# Patient Record
Sex: FEMALE | Race: WHITE | NOT HISPANIC OR LATINO | Employment: OTHER | URBAN - METROPOLITAN AREA
[De-identification: names, ages, dates, MRNs, and addresses within clinical notes are randomized per-mention and may not be internally consistent; named-entity substitution may affect disease eponyms.]

---

## 2019-11-29 ENCOUNTER — APPOINTMENT (OUTPATIENT)
Dept: RADIOLOGY | Facility: MEDICAL CENTER | Age: 84
DRG: 026 | End: 2019-11-29
Attending: HOSPITALIST
Payer: MEDICARE

## 2019-11-29 ENCOUNTER — APPOINTMENT (OUTPATIENT)
Dept: RADIOLOGY | Facility: MEDICAL CENTER | Age: 84
DRG: 026 | End: 2019-11-29
Attending: EMERGENCY MEDICINE
Payer: MEDICARE

## 2019-11-29 ENCOUNTER — APPOINTMENT (OUTPATIENT)
Dept: RADIOLOGY | Facility: MEDICAL CENTER | Age: 84
DRG: 026 | End: 2019-11-29
Attending: INTERNAL MEDICINE
Payer: MEDICARE

## 2019-11-29 ENCOUNTER — HOSPITAL ENCOUNTER (INPATIENT)
Facility: MEDICAL CENTER | Age: 84
LOS: 5 days | DRG: 026 | End: 2019-12-04
Attending: EMERGENCY MEDICINE | Admitting: HOSPITALIST
Payer: MEDICARE

## 2019-11-29 DIAGNOSIS — R53.1 RIGHT SIDED WEAKNESS: ICD-10-CM

## 2019-11-29 DIAGNOSIS — S06.5XAA SUBDURAL HEMATOMA (HCC): ICD-10-CM

## 2019-11-29 DIAGNOSIS — R20.2 PARESTHESIAS: ICD-10-CM

## 2019-11-29 PROBLEM — M25.512 CHRONIC LEFT SHOULDER PAIN: Status: ACTIVE | Noted: 2019-11-29

## 2019-11-29 PROBLEM — E03.9 ACQUIRED HYPOTHYROIDISM: Status: ACTIVE | Noted: 2019-11-29

## 2019-11-29 PROBLEM — G45.9 TIA (TRANSIENT ISCHEMIC ATTACK): Status: ACTIVE | Noted: 2019-11-29

## 2019-11-29 PROBLEM — G89.29 CHRONIC LEFT SHOULDER PAIN: Status: ACTIVE | Noted: 2019-11-29

## 2019-11-29 PROBLEM — R01.1 MURMUR, CARDIAC: Status: ACTIVE | Noted: 2019-11-29

## 2019-11-29 LAB
ABO + RH BLD: NORMAL
ABO GROUP BLD: NORMAL
ALBUMIN SERPL BCP-MCNC: 4.7 G/DL (ref 3.2–4.9)
ALBUMIN/GLOB SERPL: 2.5 G/DL
ALP SERPL-CCNC: 61 U/L (ref 30–99)
ALT SERPL-CCNC: 7 U/L (ref 2–50)
ANION GAP SERPL CALC-SCNC: 12 MMOL/L (ref 0–11.9)
APPEARANCE UR: CLEAR
APTT PPP: 28.8 SEC (ref 24.7–36)
AST SERPL-CCNC: 17 U/L (ref 12–45)
BACTERIA #/AREA URNS HPF: NEGATIVE /HPF
BASOPHILS # BLD AUTO: 0.1 % (ref 0–1.8)
BASOPHILS # BLD: 0.01 K/UL (ref 0–0.12)
BILIRUB SERPL-MCNC: 1 MG/DL (ref 0.1–1.5)
BILIRUB UR QL STRIP.AUTO: NEGATIVE
BLD GP AB SCN SERPL QL: NORMAL
BUN SERPL-MCNC: 18 MG/DL (ref 8–22)
CALCIUM SERPL-MCNC: 9.1 MG/DL (ref 8.5–10.5)
CHLORIDE SERPL-SCNC: 104 MMOL/L (ref 96–112)
CO2 SERPL-SCNC: 21 MMOL/L (ref 20–33)
COLOR UR: YELLOW
CREAT SERPL-MCNC: 0.95 MG/DL (ref 0.5–1.4)
EKG IMPRESSION: NORMAL
EOSINOPHIL # BLD AUTO: 0.06 K/UL (ref 0–0.51)
EOSINOPHIL NFR BLD: 0.8 % (ref 0–6.9)
EPI CELLS #/AREA URNS HPF: NEGATIVE /HPF
ERYTHROCYTE [DISTWIDTH] IN BLOOD BY AUTOMATED COUNT: 48.3 FL (ref 35.9–50)
EST. AVERAGE GLUCOSE BLD GHB EST-MCNC: 126 MG/DL
GLOBULIN SER CALC-MCNC: 1.9 G/DL (ref 1.9–3.5)
GLUCOSE SERPL-MCNC: 86 MG/DL (ref 65–99)
GLUCOSE UR STRIP.AUTO-MCNC: NEGATIVE MG/DL
HBA1C MFR BLD: 6 % (ref 0–5.6)
HCT VFR BLD AUTO: 42.2 % (ref 37–47)
HGB BLD-MCNC: 14 G/DL (ref 12–16)
HYALINE CASTS #/AREA URNS LPF: NORMAL /LPF
IMM GRANULOCYTES # BLD AUTO: 0.05 K/UL (ref 0–0.11)
IMM GRANULOCYTES NFR BLD AUTO: 0.7 % (ref 0–0.9)
INR PPP: 0.98 (ref 0.87–1.13)
KETONES UR STRIP.AUTO-MCNC: NEGATIVE MG/DL
LEUKOCYTE ESTERASE UR QL STRIP.AUTO: ABNORMAL
LYMPHOCYTES # BLD AUTO: 1.02 K/UL (ref 1–4.8)
LYMPHOCYTES NFR BLD: 13.9 % (ref 22–41)
MCH RBC QN AUTO: 29.9 PG (ref 27–33)
MCHC RBC AUTO-ENTMCNC: 33.2 G/DL (ref 33.6–35)
MCV RBC AUTO: 90.2 FL (ref 81.4–97.8)
MICRO URNS: ABNORMAL
MONOCYTES # BLD AUTO: 1.71 K/UL (ref 0–0.85)
MONOCYTES NFR BLD AUTO: 23.4 % (ref 0–13.4)
NEUTROPHILS # BLD AUTO: 4.47 K/UL (ref 2–7.15)
NEUTROPHILS NFR BLD: 61.1 % (ref 44–72)
NITRITE UR QL STRIP.AUTO: NEGATIVE
NRBC # BLD AUTO: 0 K/UL
NRBC BLD-RTO: 0 /100 WBC
PH UR STRIP.AUTO: 7 [PH] (ref 5–8)
PLATELET # BLD AUTO: 156 K/UL (ref 164–446)
PMV BLD AUTO: 11 FL (ref 9–12.9)
POTASSIUM SERPL-SCNC: 3.8 MMOL/L (ref 3.6–5.5)
PROT SERPL-MCNC: 6.6 G/DL (ref 6–8.2)
PROT UR QL STRIP: NEGATIVE MG/DL
PROTHROMBIN TIME: 13.1 SEC (ref 12–14.6)
RBC # BLD AUTO: 4.68 M/UL (ref 4.2–5.4)
RBC # URNS HPF: NORMAL /HPF
RBC UR QL AUTO: NEGATIVE
RH BLD: NORMAL
SODIUM SERPL-SCNC: 137 MMOL/L (ref 135–145)
SP GR UR STRIP.AUTO: 1.01
TROPONIN T SERPL-MCNC: 15 NG/L (ref 6–19)
UROBILINOGEN UR STRIP.AUTO-MCNC: 0.2 MG/DL
WBC # BLD AUTO: 7.3 K/UL (ref 4.8–10.8)
WBC #/AREA URNS HPF: NORMAL /HPF

## 2019-11-29 PROCEDURE — 0042T CT-CEREBRAL PERFUSION ANALYSIS: CPT

## 2019-11-29 PROCEDURE — 85610 PROTHROMBIN TIME: CPT

## 2019-11-29 PROCEDURE — 84484 ASSAY OF TROPONIN QUANT: CPT

## 2019-11-29 PROCEDURE — 70498 CT ANGIOGRAPHY NECK: CPT

## 2019-11-29 PROCEDURE — 70450 CT HEAD/BRAIN W/O DYE: CPT

## 2019-11-29 PROCEDURE — 770022 HCHG ROOM/CARE - ICU (200)

## 2019-11-29 PROCEDURE — 96374 THER/PROPH/DIAG INJ IV PUSH: CPT

## 2019-11-29 PROCEDURE — 83036 HEMOGLOBIN GLYCOSYLATED A1C: CPT

## 2019-11-29 PROCEDURE — 81001 URINALYSIS AUTO W/SCOPE: CPT

## 2019-11-29 PROCEDURE — 99223 1ST HOSP IP/OBS HIGH 75: CPT | Performed by: PSYCHIATRY & NEUROLOGY

## 2019-11-29 PROCEDURE — 86901 BLOOD TYPING SEROLOGIC RH(D): CPT

## 2019-11-29 PROCEDURE — 85025 COMPLETE CBC W/AUTO DIFF WBC: CPT

## 2019-11-29 PROCEDURE — 99291 CRITICAL CARE FIRST HOUR: CPT | Performed by: INTERNAL MEDICINE

## 2019-11-29 PROCEDURE — 73030 X-RAY EXAM OF SHOULDER: CPT | Mod: LT

## 2019-11-29 PROCEDURE — 70496 CT ANGIOGRAPHY HEAD: CPT

## 2019-11-29 PROCEDURE — 700101 HCHG RX REV CODE 250: Performed by: EMERGENCY MEDICINE

## 2019-11-29 PROCEDURE — 85730 THROMBOPLASTIN TIME PARTIAL: CPT

## 2019-11-29 PROCEDURE — 71045 X-RAY EXAM CHEST 1 VIEW: CPT

## 2019-11-29 PROCEDURE — 99291 CRITICAL CARE FIRST HOUR: CPT

## 2019-11-29 PROCEDURE — 70551 MRI BRAIN STEM W/O DYE: CPT

## 2019-11-29 PROCEDURE — A9270 NON-COVERED ITEM OR SERVICE: HCPCS | Performed by: HOSPITALIST

## 2019-11-29 PROCEDURE — 80053 COMPREHEN METABOLIC PANEL: CPT

## 2019-11-29 PROCEDURE — 93005 ELECTROCARDIOGRAM TRACING: CPT | Performed by: EMERGENCY MEDICINE

## 2019-11-29 PROCEDURE — 700102 HCHG RX REV CODE 250 W/ 637 OVERRIDE(OP): Performed by: HOSPITALIST

## 2019-11-29 PROCEDURE — 700117 HCHG RX CONTRAST REV CODE 255: Performed by: EMERGENCY MEDICINE

## 2019-11-29 PROCEDURE — 86850 RBC ANTIBODY SCREEN: CPT

## 2019-11-29 PROCEDURE — 99223 1ST HOSP IP/OBS HIGH 75: CPT | Mod: AI | Performed by: HOSPITALIST

## 2019-11-29 PROCEDURE — 86900 BLOOD TYPING SEROLOGIC ABO: CPT

## 2019-11-29 RX ORDER — SODIUM CHLORIDE 9 MG/ML
INJECTION, SOLUTION INTRAVENOUS
Status: DISCONTINUED | OUTPATIENT
Start: 2019-11-29 | End: 2019-11-29 | Stop reason: HOSPADM

## 2019-11-29 RX ORDER — ATORVASTATIN CALCIUM 40 MG/1
40 TABLET, FILM COATED ORAL EVERY EVENING
Status: DISCONTINUED | OUTPATIENT
Start: 2019-11-29 | End: 2019-12-04 | Stop reason: HOSPADM

## 2019-11-29 RX ORDER — LABETALOL HYDROCHLORIDE 5 MG/ML
10 INJECTION, SOLUTION INTRAVENOUS EVERY 4 HOURS PRN
Status: DISCONTINUED | OUTPATIENT
Start: 2019-11-29 | End: 2019-11-30

## 2019-11-29 RX ORDER — LEVOTHYROXINE SODIUM 0.1 MG/1
100 TABLET ORAL
COMMUNITY
End: 2019-12-07

## 2019-11-29 RX ORDER — IBUPROFEN 200 MG
200 TABLET ORAL EVERY 6 HOURS PRN
Status: ON HOLD | COMMUNITY
End: 2019-12-04

## 2019-11-29 RX ORDER — HYDRALAZINE HYDROCHLORIDE 20 MG/ML
20 INJECTION INTRAMUSCULAR; INTRAVENOUS EVERY 4 HOURS PRN
Status: DISCONTINUED | OUTPATIENT
Start: 2019-11-29 | End: 2019-11-29 | Stop reason: HOSPADM

## 2019-11-29 RX ORDER — BISACODYL 10 MG
10 SUPPOSITORY, RECTAL RECTAL
Status: DISCONTINUED | OUTPATIENT
Start: 2019-11-29 | End: 2019-12-04 | Stop reason: HOSPADM

## 2019-11-29 RX ORDER — LEVETIRACETAM 500 MG/1
500 TABLET ORAL 2 TIMES DAILY
Status: DISCONTINUED | OUTPATIENT
Start: 2019-11-29 | End: 2019-12-03

## 2019-11-29 RX ORDER — LEVOTHYROXINE SODIUM 0.1 MG/1
100 TABLET ORAL
Status: DISCONTINUED | OUTPATIENT
Start: 2019-11-29 | End: 2019-12-04 | Stop reason: HOSPADM

## 2019-11-29 RX ORDER — ONDANSETRON 4 MG/1
4 TABLET, ORALLY DISINTEGRATING ORAL EVERY 4 HOURS PRN
Status: DISCONTINUED | OUTPATIENT
Start: 2019-11-29 | End: 2019-12-04 | Stop reason: HOSPADM

## 2019-11-29 RX ORDER — ONDANSETRON 2 MG/ML
4 INJECTION INTRAMUSCULAR; INTRAVENOUS EVERY 4 HOURS PRN
Status: DISCONTINUED | OUTPATIENT
Start: 2019-11-29 | End: 2019-12-04 | Stop reason: HOSPADM

## 2019-11-29 RX ORDER — LEVETIRACETAM 100 MG/ML
500 SOLUTION ORAL EVERY 12 HOURS
Status: DISCONTINUED | OUTPATIENT
Start: 2019-11-29 | End: 2019-11-29

## 2019-11-29 RX ORDER — POLYETHYLENE GLYCOL 3350 17 G/17G
1 POWDER, FOR SOLUTION ORAL
Status: DISCONTINUED | OUTPATIENT
Start: 2019-11-29 | End: 2019-12-04 | Stop reason: HOSPADM

## 2019-11-29 RX ORDER — ACETAMINOPHEN 325 MG/1
650 TABLET ORAL EVERY 6 HOURS PRN
Status: DISCONTINUED | OUTPATIENT
Start: 2019-11-29 | End: 2019-12-04 | Stop reason: HOSPADM

## 2019-11-29 RX ORDER — AMOXICILLIN 250 MG
2 CAPSULE ORAL 2 TIMES DAILY
Status: DISCONTINUED | OUTPATIENT
Start: 2019-11-29 | End: 2019-12-04 | Stop reason: HOSPADM

## 2019-11-29 RX ORDER — LABETALOL HYDROCHLORIDE 5 MG/ML
10 INJECTION, SOLUTION INTRAVENOUS
Status: DISCONTINUED | OUTPATIENT
Start: 2019-11-29 | End: 2019-11-29

## 2019-11-29 RX ADMIN — IOHEXOL 40 ML: 350 INJECTION, SOLUTION INTRAVENOUS at 11:27

## 2019-11-29 RX ADMIN — ATORVASTATIN CALCIUM 40 MG: 40 TABLET, FILM COATED ORAL at 18:29

## 2019-11-29 RX ADMIN — LABETALOL HYDROCHLORIDE 10 MG: 5 INJECTION, SOLUTION INTRAVENOUS at 12:24

## 2019-11-29 RX ADMIN — SENNOSIDES AND DOCUSATE SODIUM 2 TABLET: 8.6; 5 TABLET ORAL at 18:29

## 2019-11-29 RX ADMIN — LEVETIRACETAM 500 MG: 500 TABLET ORAL at 18:29

## 2019-11-29 RX ADMIN — IOHEXOL 80 ML: 350 INJECTION, SOLUTION INTRAVENOUS at 11:36

## 2019-11-29 SDOH — ECONOMIC STABILITY: FOOD INSECURITY: WITHIN THE PAST 12 MONTHS, YOU WORRIED THAT YOUR FOOD WOULD RUN OUT BEFORE YOU GOT MONEY TO BUY MORE.: NEVER TRUE

## 2019-11-29 SDOH — HEALTH STABILITY: MENTAL HEALTH: HOW OFTEN DO YOU HAVE 6 OR MORE DRINKS ON ONE OCCASION?: NEVER

## 2019-11-29 SDOH — HEALTH STABILITY: MENTAL HEALTH: HOW MANY STANDARD DRINKS CONTAINING ALCOHOL DO YOU HAVE ON A TYPICAL DAY?: 1 OR 2

## 2019-11-29 SDOH — ECONOMIC STABILITY: TRANSPORTATION INSECURITY
IN THE PAST 12 MONTHS, HAS LACK OF TRANSPORTATION KEPT YOU FROM MEETINGS, WORK, OR FROM GETTING THINGS NEEDED FOR DAILY LIVING?: NO

## 2019-11-29 SDOH — ECONOMIC STABILITY: FOOD INSECURITY: WITHIN THE PAST 12 MONTHS, THE FOOD YOU BOUGHT JUST DIDN'T LAST AND YOU DIDN'T HAVE MONEY TO GET MORE.: NEVER TRUE

## 2019-11-29 SDOH — ECONOMIC STABILITY: TRANSPORTATION INSECURITY
IN THE PAST 12 MONTHS, HAS THE LACK OF TRANSPORTATION KEPT YOU FROM MEDICAL APPOINTMENTS OR FROM GETTING MEDICATIONS?: NO

## 2019-11-29 SDOH — HEALTH STABILITY: MENTAL HEALTH: HOW OFTEN DO YOU HAVE A DRINK CONTAINING ALCOHOL?: 4 OR MORE TIMES A WEEK

## 2019-11-29 ASSESSMENT — LIFESTYLE VARIABLES
DO YOU DRINK ALCOHOL: NO
HOW MANY TIMES IN THE PAST YEAR HAVE YOU HAD 5 OR MORE DRINKS IN A DAY: 0
HAVE YOU EVER FELT YOU SHOULD CUT DOWN ON YOUR DRINKING: NO
ALCOHOL_USE: YES
ON A TYPICAL DAY WHEN YOU DRINK ALCOHOL HOW MANY DRINKS DO YOU HAVE: 1
EVER_SMOKED: YES
TOTAL SCORE: 0
DOES PATIENT WANT TO STOP DRINKING: NO
EVER FELT BAD OR GUILTY ABOUT YOUR DRINKING: NO
TOTAL SCORE: 0
HAVE PEOPLE ANNOYED YOU BY CRITICIZING YOUR DRINKING: NO
AVERAGE NUMBER OF DAYS PER WEEK YOU HAVE A DRINK CONTAINING ALCOHOL: 1
TOTAL SCORE: 0
EVER HAD A DRINK FIRST THING IN THE MORNING TO STEADY YOUR NERVES TO GET RID OF A HANGOVER: NO
CONSUMPTION TOTAL: NEGATIVE

## 2019-11-29 ASSESSMENT — COGNITIVE AND FUNCTIONAL STATUS - GENERAL
DAILY ACTIVITIY SCORE: 24
CLIMB 3 TO 5 STEPS WITH RAILING: A LITTLE
SUGGESTED CMS G CODE MODIFIER MOBILITY: CJ
WALKING IN HOSPITAL ROOM: A LITTLE
SUGGESTED CMS G CODE MODIFIER DAILY ACTIVITY: CH
MOBILITY SCORE: 22

## 2019-11-29 ASSESSMENT — ENCOUNTER SYMPTOMS
DIARRHEA: 0
SENSORY CHANGE: 1
ORTHOPNEA: 0
BACK PAIN: 0
FOCAL WEAKNESS: 1
SPUTUM PRODUCTION: 0
FEVER: 0
HEADACHES: 0
PHOTOPHOBIA: 0
NERVOUS/ANXIOUS: 0
SPEECH CHANGE: 0
SEIZURES: 0
ABDOMINAL PAIN: 0
NAUSEA: 0
DIZZINESS: 0
DOUBLE VISION: 0
VOMITING: 0
LOSS OF CONSCIOUSNESS: 0
PALPITATIONS: 0
CHILLS: 0
BLURRED VISION: 0
COUGH: 0
WEAKNESS: 1
SHORTNESS OF BREATH: 0

## 2019-11-29 ASSESSMENT — PATIENT HEALTH QUESTIONNAIRE - PHQ9
2. FEELING DOWN, DEPRESSED, IRRITABLE, OR HOPELESS: NOT AT ALL
1. LITTLE INTEREST OR PLEASURE IN DOING THINGS: NOT AT ALL
SUM OF ALL RESPONSES TO PHQ9 QUESTIONS 1 AND 2: 0
1. LITTLE INTEREST OR PLEASURE IN DOING THINGS: NOT AT ALL
2. FEELING DOWN, DEPRESSED, IRRITABLE, OR HOPELESS: NOT AT ALL
SUM OF ALL RESPONSES TO PHQ9 QUESTIONS 1 AND 2: 0

## 2019-11-29 NOTE — ED NOTES
ERP aware of BP, order to recheck in 15 minutes if still hypertensive then medications already ordered PRN.

## 2019-11-29 NOTE — CONSULTS
Critical Care Consultation    Date of consult: 11/29/2019    Referring Physician  Romi Madrid M.D.    Reason for Consultation  SDH    History of Presenting Illness  84 y.o. female who to ED with acute onset of right side numbness and RUL weakness. Started at 10.30am today. Code stroke was activated. CT head with acute-subacute 6mm SDH on the left and 2-3mm left shift. CTA head/neck w/o LVO or aneurysms in anterior/posterior circulation. CTP was no mismatched volume.   No hx of seizures. No trauma. No hx of CVA. Not on any anticoagulation    Per patient, she fell out of bed in 10/2019, didn't remember any head trauma. No LOC, dizziness.  Since then she started to have headache that occurs frequently almost every day (5 days per week).   She also has left side shoulder pain and inability to bring her left shoulder above head since the fall. In addition, she fell in 7/2019, but not remembering the details and not sure if head trauma was involved.     During my exam, pt reported that her right side facial numbness has improved and in fact almost resolved. RUE weakness is also resolving. Denies any chest pain, SOB, leg pain, leg weakness, dizziness, blurry vision/double vision    Neurology was on the case. NSGY was called and recommended CT head in am. Start on keppra for seizure prophylaxis    Code Status  Full Code    Review of Systems  Review of Systems   Constitutional: Negative for chills, fever and malaise/fatigue.   Eyes: Negative for blurred vision, double vision and photophobia.   Respiratory: Negative for cough, sputum production and shortness of breath.    Cardiovascular: Negative for chest pain, palpitations, orthopnea and leg swelling.   Gastrointestinal: Negative for abdominal pain, diarrhea, nausea and vomiting.   Genitourinary: Negative for dysuria.   Musculoskeletal: Negative for back pain and joint pain.   Skin: Negative for rash.   Neurological: Positive for sensory change, focal weakness and  weakness. Negative for dizziness, speech change, seizures, loss of consciousness and headaches.   Psychiatric/Behavioral: The patient is not nervous/anxious.    All other systems reviewed and are negative.      Past Medical History   has no past medical history on file.    Surgical History   has no past surgical history on file.    Family History  family history is not on file.    Social History       Medications  Home Medications     Reviewed by Arian Mahajan (Pharmacy Tech) on 11/29/19 at 1417  Med List Status: Complete   Medication Last Dose Status   ibuprofen (MOTRIN) 200 MG Tab 11/29/2019 Active   levothyroxine (SYNTHROID) 100 MCG Tab 11/29/2019 Active              Current Facility-Administered Medications   Medication Dose Route Frequency Provider Last Rate Last Dose   • labetalol (NORMODYNE/TRANDATE) injection 10 mg  10 mg Intravenous Q HOUR PRN Romi Madrid M.D.   10 mg at 11/29/19 1224   • hydrALAZINE (APRESOLINE) injection 20 mg  20 mg Intravenous Q4HRS PRN Romi Madrid M.D.       • niCARdipine (CARDENE) 25 mg in  mL Infusion  2.5-15 mg/hr Intravenous Continuous Romi Madrid M.D.   Stopped at 11/29/19 1215   • NS infusion   Intravenous Once PRN Romi Madrid M.D.       • norepinephrine (LEVOPHED) 8 mg in  mL Infusion  1-30 mcg/min Intravenous Continuous Romi Madrid M.D.   Stopped at 11/29/19 1215   • levothyroxine (SYNTHROID) tablet 100 mcg  100 mcg Oral AM ES Colin Ortez M.D.   Stopped at 11/29/19 1500   • senna-docusate (PERICOLACE or SENOKOT S) 8.6-50 MG per tablet 2 Tab  2 Tab Oral BID Colin Ortez M.D.        And   • polyethylene glycol/lytes (MIRALAX) PACKET 1 Packet  1 Packet Oral QDAY PRN Colin Ortez M.D.        And   • magnesium hydroxide (MILK OF MAGNESIA) suspension 30 mL  30 mL Oral QDAY PRN Colin Ortez M.D.        And   • bisacodyl (DULCOLAX) suppository 10 mg  10 mg Rectal QDAY PRN Colin Ortez M.D.       •  Pharmacy consult request - Allow for permissive hypertension: SBP up to 220 mmHg/DBP up to 120 mmHg x 48 hours   Other PHARMACY TO DOSE Colin Ortez M.D.       • acetaminophen (TYLENOL) tablet 650 mg  650 mg Oral Q6HRS PRN Colin Ortez M.D.       • labetalol (NORMODYNE/TRANDATE) injection 10 mg  10 mg Intravenous Q4HRS PRN Colin Ortez M.D.       • ondansetron (ZOFRAN) syringe/vial injection 4 mg  4 mg Intravenous Q4HRS PRN Colin Ortez M.D.       • ondansetron (ZOFRAN ODT) dispertab 4 mg  4 mg Oral Q4HRS PRN Colin Ortez M.D.       • atorvastatin (LIPITOR) tablet 40 mg  40 mg Oral Q EVENING Colin Ortez M.D.       • levETIRAcetam (KEPPRA) 100 MG/ML solution 500 mg  500 mg Oral Q12HRS Colin Ortez M.D.         Current Outpatient Medications   Medication Sig Dispense Refill   • levothyroxine (SYNTHROID) 100 MCG Tab Take 100 mcg by mouth Every morning on an empty stomach.     • ibuprofen (MOTRIN) 200 MG Tab Take 200 mg by mouth every 6 hours as needed for Mild Pain.         Allergies  Allergies   Allergen Reactions   • Sulfa Drugs Itching       Vital Signs last 24 hours  Pulse:  [70-82] 70  Resp:  [16-18] 18  BP: (134-186)/(59-87) 134/67  SpO2:  [94 %-97 %] 97 %    Physical Exam  Physical Exam  Vitals signs and nursing note reviewed.   Constitutional:       General: She is not in acute distress.     Appearance: Normal appearance. She is not ill-appearing, toxic-appearing or diaphoretic.      Comments: Alert, oriented  Appropriate to questions  Daughter at bedside during my exam   HENT:      Head: Normocephalic and atraumatic.      Nose: Nose normal.      Mouth/Throat:      Mouth: Mucous membranes are moist.   Eyes:      Conjunctiva/sclera: Conjunctivae normal.      Pupils: Pupils are equal, round, and reactive to light.   Neck:      Musculoskeletal: Normal range of motion and neck supple.   Cardiovascular:      Rate and Rhythm: Normal rate and regular rhythm.       Pulses: Normal pulses.      Heart sounds: Normal heart sounds. No murmur.   Pulmonary:      Effort: Pulmonary effort is normal. No respiratory distress.      Breath sounds: Normal breath sounds. No wheezing, rhonchi or rales.   Abdominal:      General: Bowel sounds are normal. There is no distension.      Palpations: Abdomen is soft.      Tenderness: There is no tenderness. There is no guarding.   Musculoskeletal:      Right lower leg: No edema.      Left lower leg: No edema.      Comments: Left shoulder with limited ROM  Inability to lift left shoulder above head, decrease abduction/flexion   Skin:     General: Skin is warm.      Capillary Refill: Capillary refill takes 2 to 3 seconds.      Coloration: Skin is not jaundiced.      Findings: No bruising or rash.   Neurological:      General: No focal deficit present.      Mental Status: She is alert and oriented to person, place, and time.      Cranial Nerves: No cranial nerve deficit.      Sensory: No sensory deficit.      Motor: No weakness.      Comments: Alert, oriented, able to provide history  No dysarthria  CN II-XII grossly intact   Psychiatric:         Mood and Affect: Mood normal.         Behavior: Behavior normal.         Fluids  No intake or output data in the 24 hours ending 11/29/19 1452    Laboratory  Recent Results (from the past 48 hour(s))   CBC WITH DIFFERENTIAL    Collection Time: 11/29/19 11:19 AM   Result Value Ref Range    WBC 7.3 4.8 - 10.8 K/uL    RBC 4.68 4.20 - 5.40 M/uL    Hemoglobin 14.0 12.0 - 16.0 g/dL    Hematocrit 42.2 37.0 - 47.0 %    MCV 90.2 81.4 - 97.8 fL    MCH 29.9 27.0 - 33.0 pg    MCHC 33.2 (L) 33.6 - 35.0 g/dL    RDW 48.3 35.9 - 50.0 fL    Platelet Count 156 (L) 164 - 446 K/uL    MPV 11.0 9.0 - 12.9 fL    Neutrophils-Polys 61.10 44.00 - 72.00 %    Lymphocytes 13.90 (L) 22.00 - 41.00 %    Monocytes 23.40 (H) 0.00 - 13.40 %    Eosinophils 0.80 0.00 - 6.90 %    Basophils 0.10 0.00 - 1.80 %    Immature Granulocytes 0.70 0.00 -  0.90 %    Nucleated RBC 0.00 /100 WBC    Neutrophils (Absolute) 4.47 2.00 - 7.15 K/uL    Lymphs (Absolute) 1.02 1.00 - 4.80 K/uL    Monos (Absolute) 1.71 (H) 0.00 - 0.85 K/uL    Eos (Absolute) 0.06 0.00 - 0.51 K/uL    Baso (Absolute) 0.01 0.00 - 0.12 K/uL    Immature Granulocytes (abs) 0.05 0.00 - 0.11 K/uL    NRBC (Absolute) 0.00 K/uL   COMP METABOLIC PANEL    Collection Time: 11/29/19 11:19 AM   Result Value Ref Range    Sodium 137 135 - 145 mmol/L    Potassium 3.8 3.6 - 5.5 mmol/L    Chloride 104 96 - 112 mmol/L    Co2 21 20 - 33 mmol/L    Anion Gap 12.0 (H) 0.0 - 11.9    Glucose 86 65 - 99 mg/dL    Bun 18 8 - 22 mg/dL    Creatinine 0.95 0.50 - 1.40 mg/dL    Calcium 9.1 8.5 - 10.5 mg/dL    AST(SGOT) 17 12 - 45 U/L    ALT(SGPT) 7 2 - 50 U/L    Alkaline Phosphatase 61 30 - 99 U/L    Total Bilirubin 1.0 0.1 - 1.5 mg/dL    Albumin 4.7 3.2 - 4.9 g/dL    Total Protein 6.6 6.0 - 8.2 g/dL    Globulin 1.9 1.9 - 3.5 g/dL    A-G Ratio 2.5 g/dL   PROTHROMBIN TIME    Collection Time: 11/29/19 11:19 AM   Result Value Ref Range    PT 13.1 12.0 - 14.6 sec    INR 0.98 0.87 - 1.13   APTT    Collection Time: 11/29/19 11:19 AM   Result Value Ref Range    APTT 28.8 24.7 - 36.0 sec   COD (ADULT)    Collection Time: 11/29/19 11:19 AM   Result Value Ref Range    ABO Grouping Only O     Rh Grouping Only POS     Antibody Screen-Cod NEG    TROPONIN    Collection Time: 11/29/19 11:19 AM   Result Value Ref Range    Troponin T 15 6 - 19 ng/L   ESTIMATED GFR    Collection Time: 11/29/19 11:19 AM   Result Value Ref Range    GFR If African American >60 >60 mL/min/1.73 m 2    GFR If Non  56 (A) >60 mL/min/1.73 m 2   EKG (NOW)    Collection Time: 11/29/19 11:52 AM   Result Value Ref Range    Report       [facility]    Test Date:  2019-11-29  Pt Name:    JIN OLMEDO               Department: ER  MRN:        [id number]                      Room:  Gender:     Female                       Technician:  [id number]  :        [id number]                   Requested By:REN HODGE  Order #:    [id number]                    Reading MD:    Measurements  Intervals                                Axis  Rate:       81                           P:          46  MS:         160                          QRS:        13  QRSD:       80                           T:          16  QT:         404  QTc:        469    Interpretive Statements  SINUS RHYTHM  PROBABLE LEFT ATRIAL ABNORMALITY  EARLY PRECORDIAL R/S TRANSITION  No previous ECG available for comparison     URINALYSIS CULTURE, IF INDICATED    Collection Time: 19 12:13 PM   Result Value Ref Range    Color Yellow     Character Clear     Specific Gravity 1.013 <1.035    Ph 7.0 5.0 - 8.0    Glucose Negative Negative mg/dL    Ketones Negative Negative mg/dL    Protein Negative Negative mg/dL    Bilirubin Negative Negative    Urobilinogen, Urine 0.2 Negative    Nitrite Negative Negative    Leukocyte Esterase Trace (A) Negative    Occult Blood Negative Negative    Micro Urine Req Microscopic    URINE MICROSCOPIC (W/UA)    Collection Time: 19 12:13 PM   Result Value Ref Range    WBC 0-2 /hpf    RBC 0-2 /hpf    Bacteria Negative None /hpf    Epithelial Cells Negative /hpf    Hyaline Cast 0-2 /lpf   ABO Rh Confirm    Collection Time: 19 12:42 PM   Result Value Ref Range    ABO Rh Confirm O POS        Imaging  DX-CHEST-PORTABLE (1 VIEW)   Final Result      No acute cardiac or pulmonary abnormalities are identified.      CT-CTA HEAD WITH & W/O-POST PROCESS   Final Result      CT angiogram of the [place] within normal limits.      CT-CTA NECK WITH & W/O-POST PROCESSING   Final Result      Mild atherosclerotic disease without evidence of significant stenosis or occlusion.      CT-CEREBRAL PERFUSION ANALYSIS   Final Result      1.  Cerebral blood flow less than 30% likely representing completed infarct = 0 mL.      2.  T Max more than 6 seconds likely representing  combination of completed infarct and ischemia = 0 mL.      3.  Mismatched volume likely representing ischemic brain/penumbra = None      4.  Please note that the cerebral perfusion was performed on the limited brain tissue around the basal ganglia region. Infarct/ischemia outside the CT perfusion sections can be missed in this study.      CT-HEAD W/O   Final Result      Left parietal subdural hematoma appears acute-subacute and there is a 2 mm rightward midline shift      Age-appropriate cerebral volume loss.      Moderate white matter hypodensity is present.  This is a nonspecific finding which usually is found to represent chronic microvascular disease in patient's of this demographic.  Demyelination, age indeterminant ischemia and gliosis are also common    possibilities.      Chronic right caudate head lacunar infarction      I discussed the findings with the patient's neurologist before this interpretation was generated      MR-BRAIN-W/O    (Results Pending)       Assessment/Plan  * Subdural hematoma (HCC)  Assessment & Plan  Acute/subacute 6mm SDH on the left with 2-3mm midline shift. Suspect due to the fall in 10/2019.   No obvious evidence of CVA on CT. CTA head/neck without any LVO or aneurysm.   CTP with no mismatched volume.     Plan:  neurocheck Q1H  MRI brain   Keppra 500 Q12H  I don't think we need to start any hypertonic solution given her resolving neurological symptoms.   Low threshold for repeat CT if there's acute neurological changes  Can protect her airway at this point.   Neuro following  NSGY has been consulted      Left shoulder pain  Assessment & Plan  Likely subacute from her fall in 10/2019  Check Xray left shoulder to rule out fracture.   No evidence of edema/erythema on my exam. Limited ROM, inability to lift left arm above head or abduction/flexion    Admit to ICU, RICU    Discussed patient condition and risk of morbidity and/or mortality with Hospitalist, Family, RN, RT, Pharmacy and  Patient.    The patient remains critically ill.  Critical care time = 38 minutes in directly providing and coordinating critical care and extensive data review.  No time overlap and excludes procedures.

## 2019-11-29 NOTE — H&P
Hospital Medicine History & Physical Note    Date of Service  11/29/2019    Primary Care Physician  No primary care provider on file.    Consultants  Neurosurgery  Neurology    Code Status  Full code    Chief Complaint  Right-sided weakness and numbness    History of Presenting Illness  84 y.o. female who presented 11/29/2019 with history of hypothyroidismwas at her baseline until this morning around 10:30 AM when she suddenly developed right sided weakness associated with numbness and tingling and right facial weakness.  Symptoms lasted all and all for about 2 hours and gradually resolved.  At the time of my examination her weakness has completely subsided.  She has not had any similar symptoms in the past.  She denies any prior history of stroke.  She reports that she rolled out of bed during her sleep and fell on hard floor last month, she had some transient headaches after her fall but none lately. she also sustained a fall last June with no apparent injuries.  She denies any loss of consciousness .  No fever or chills.  She does not take any anticoagulants or aspirin.    Review of Systems  Review of Systems   Musculoskeletal: Positive for joint pain (Left shoulder).   All other systems reviewed and are negative.      Past Medical History  Hypothyroidism    Surgical History  Negative per patient    Family History  Reviewed and not pertinent to the presenting problem    Social History   She does not smoke she drinks 2 glasses of wine daily no illicit drug use  She recently moved with her daughter    Allergies  Allergies   Allergen Reactions   • Sulfa Drugs Itching       Medications  Prior to Admission Medications   Prescriptions Last Dose Informant Patient Reported? Taking?   ibuprofen (MOTRIN) 200 MG Tab 11/29/2019 at 0530 Patient Yes Yes   Sig: Take 200 mg by mouth every 6 hours as needed for Mild Pain.   levothyroxine (SYNTHROID) 100 MCG Tab 11/29/2019 at 0100 Patient Yes Yes   Sig: Take 100 mcg by mouth Every  morning on an empty stomach.      Facility-Administered Medications: None       Physical Exam  Pulse:  [70-82] 70  Resp:  [16-18] 18  BP: (134-186)/(59-87) 134/67  SpO2:  [94 %-97 %] 97 %    Physical Exam  Vitals signs and nursing note reviewed.   Constitutional:       General: She is not in acute distress.  HENT:      Head: Normocephalic and atraumatic.      Nose: Nose normal.      Mouth/Throat:      Pharynx: No oropharyngeal exudate or posterior oropharyngeal erythema.   Eyes:      General:         Right eye: No discharge.         Left eye: No discharge.   Neck:      Musculoskeletal: Neck supple.   Cardiovascular:      Rate and Rhythm: Normal rate and regular rhythm.      Heart sounds: Murmur present. No friction rub. No gallop.    Pulmonary:      Effort: Pulmonary effort is normal. No respiratory distress.      Breath sounds: No stridor. No rhonchi or rales.   Chest:      Chest wall: No tenderness.   Abdominal:      General: Bowel sounds are normal. There is no distension.      Palpations: Abdomen is soft. There is no mass.      Tenderness: There is no tenderness. There is no guarding.   Musculoskeletal:         General: No swelling or tenderness.   Skin:     General: Skin is warm and dry.      Coloration: Skin is not cyanotic.      Nails: There is no clubbing.     Neurological:      General: No focal deficit present.      Mental Status: She is alert and oriented to person, place, and time.      Cranial Nerves: No cranial nerve deficit.      Motor: No weakness.   Psychiatric:         Mood and Affect: Mood normal.         Behavior: Behavior normal.         Thought Content: Thought content normal.         Judgment: Judgment normal.         Laboratory:  Recent Labs     11/29/19  1119   WBC 7.3   RBC 4.68   HEMOGLOBIN 14.0   HEMATOCRIT 42.2   MCV 90.2   MCH 29.9   MCHC 33.2*   RDW 48.3   PLATELETCT 156*   MPV 11.0     Recent Labs     11/29/19  1119   SODIUM 137   POTASSIUM 3.8   CHLORIDE 104   CO2 21   GLUCOSE 86    BUN 18   CREATININE 0.95   CALCIUM 9.1     Recent Labs     11/29/19  1119   ALTSGPT 7   ASTSGOT 17   ALKPHOSPHAT 61   TBILIRUBIN 1.0   GLUCOSE 86     Recent Labs     11/29/19  1119   APTT 28.8   INR 0.98     No results for input(s): NTPROBNP in the last 72 hours.      Recent Labs     11/29/19  1119   TROPONINT 15       Urinalysis:    Recent Labs     11/29/19  1213   SPECGRAVITY 1.013   GLUCOSEUR Negative   KETONES Negative   NITRITE Negative   LEUKESTERAS Trace*   WBCURINE 0-2   RBCURINE 0-2   BACTERIA Negative   EPITHELCELL Negative        Imaging:  DX-SHOULDER 2+ LEFT   Final Result      Unremarkable shoulder series.                  INTERPRETING LOCATION:  [facility], [facility], [id number]      DX-CHEST-PORTABLE (1 VIEW)   Final Result      No acute cardiac or pulmonary abnormalities are identified.      CT-CTA HEAD WITH & W/O-POST PROCESS   Final Result      CT angiogram of the [place] within normal limits.      CT-CTA NECK WITH & W/O-POST PROCESSING   Final Result      Mild atherosclerotic disease without evidence of significant stenosis or occlusion.      CT-CEREBRAL PERFUSION ANALYSIS   Final Result      1.  Cerebral blood flow less than 30% likely representing completed infarct = 0 mL.      2.  T Max more than 6 seconds likely representing combination of completed infarct and ischemia = 0 mL.      3.  Mismatched volume likely representing ischemic brain/penumbra = None      4.  Please note that the cerebral perfusion was performed on the limited brain tissue around the basal ganglia region. Infarct/ischemia outside the CT perfusion sections can be missed in this study.      CT-HEAD W/O   Final Result      Left parietal subdural hematoma appears acute-subacute and there is a 2 mm rightward midline shift      Age-appropriate cerebral volume loss.      Moderate white matter hypodensity is present.  This is a nonspecific finding which usually is found to represent chronic microvascular disease in patient's  of this demographic.  Demyelination, age indeterminant ischemia and gliosis are also common    possibilities.      Chronic right caudate head lacunar infarction      I discussed the findings with the patient's neurologist before this interpretation was generated      MR-BRAIN-W/O    (Results Pending)         Assessment/Plan:  I anticipate this patient will require at least two midnights for appropriate medical management, necessitating inpatient admission.    * Subdural hematoma (HCC)  Assessment & Plan  Likely related to her fall in October  Patient evaluated by neurosurgery with recommendations to repeat CT in 24 hours and follow-up on MRI  She will be started on Keppra per neurosurgery recommendations  Discussed with patient restricting alcohol use to less than 1 drink per day      Left shoulder pain  Assessment & Plan  X-ray reviewed negative for fracture    Murmur, cardiac  Assessment & Plan  Check echocardiogram    Acquired hypothyroidism  Assessment & Plan  Continue live    TIA (transient ischemic attack)  Assessment & Plan  Right-sided weakness could be secondary to TIA versus possible seizure related to subdural hematoma  She was evaluated by neurology  Dr Encarnacion recommended every hour neurochecks with a stat CT if any changes in neurologic status as well as MRI of brain  We will hold off on aspirin given subdural hematoma  We will start her on atorvastatin check lipid panel given old lacunar infarcts noted on CT      VTE prophylaxis: SCD

## 2019-11-29 NOTE — ED NOTES
Neuro surgeon here seeing patient in room, POC for patient to have MRI to determine whether or not patient needs to be admitted to ICU. Patient made aware of this plan by Dr. Cabrera who also spoke with ERP Dr Madrid. Pt neuro examination improving, no weakness of the right arm or leg, only neuro change still present is numbness on the right side of the mouth.

## 2019-11-29 NOTE — ED NOTES
No s/sx of distress, patient awake, alert, oriented. No change in neuro exam. Patient resumed talking on phone with family member.

## 2019-11-29 NOTE — ED PROVIDER NOTES
ED Provider Note    Chief Complaint:   Stroke activation    HPI:  Michelle Mckeon is a 84 y.o. female who presents brought by paramedics as a prehospital stroke activation.  Her last known normal was around 10:40 AM, approximately 40 minutes prior to arrival.  She initially developed right-sided paresthesias, described as right leg numbness, followed by right arm numbness, as well as right facial numbness.  On paramedic arrival, they noted no weakness, however during transport she stated that her paresthesias were improving.  At this time they noticed some drift to the right lower extremity, as well as drift to the right upper extremity.  She is noted to have some weakness in the left upper extremity as well, however she reports an old shoulder injury that contributes, denies any new left upper extremity weakness.  On arrival to the emergency department her motor weakness has improved, but she continues to have some decreased sensation in the right upper extremity.    She denies any associated headaches, denies any recent falls.  She is not currently on any anticoagulation or antiplatelet agents.  She has had no associated nausea or vomiting.  She has no prior history of CVA or TIA.    Review of Systems:  See HPI for pertinent positives and negatives. All other systems negative.    Past Medical History:       Social History:  Social History     Tobacco Use   • Smoking status: Not on file   Substance and Sexual Activity   • Alcohol use: Not on file   • Drug use: Not on file   • Sexual activity: Not on file       Surgical History:  patient denies any surgical history    Current Medications:  Home Medications     Reviewed by Joan Zimmerman R.N. (Registered Nurse) on 11/29/19 at 1206  Med List Status: Partial   Medication Last Dose Status   levothyroxine (SYNTHROID) 100 MCG Tab  Active                Allergies:  Allergies   Allergen Reactions   • Sulfa Drugs Unspecified     unverified       Physical Exam:  Vital Signs:  "/67   Pulse 70   Resp 18   Ht 1.549 m (5' 1\")   Wt 63.5 kg (140 lb)   SpO2 97%   BMI 26.45 kg/m²   Constitutional: Alert, calm, comfortable  HENT: Moist mucus membranes, atraumatic, normocephalic  Eyes: Pupils equal and reactive, normal conjunctiva  Neck: Supple, normal range of motion, no stridor  Cardiovascular: Extremities are warm and well perfused, no murmur appreciated, normal cardiac auscultation  Pulmonary: No respiratory distress, normal work of breathing, no accessory muscule usage, breath sounds clear and equal bilaterally  Abdomen: Soft, non-distended, non-tender to palpation, no peritoneal signs  Skin: Warm, dry, no rashes or lesions  Musculoskeletal: Normal range of motion in all extremities, no swelling or deformity noted  Neurologic: Alert, oriented, normal speech, normal motor function, possible mild slurred speech, no facial droop, NIHSS-2 with points deducted for sensory deficit, as well as upper extremity drift.  Psychiatric: Normal and appropriate mood and affect    Medical records reviewed for continuity of care.  No recent medical records available for review.    EKG: Rate 81, normal sinus rhythm, no ST elevation or depression, T wave flattening present in leads III and aVF.  Normal voltage.    Labs:  Labs Reviewed   CBC WITH DIFFERENTIAL - Abnormal; Notable for the following components:       Result Value    MCHC 33.2 (*)     Platelet Count 156 (*)     Lymphocytes 13.90 (*)     Monocytes 23.40 (*)     Monos (Absolute) 1.71 (*)     All other components within normal limits    Narrative:     Indicate which anticoagulants the patient is on:->UNKNOWN   COMP METABOLIC PANEL - Abnormal; Notable for the following components:    Anion Gap 12.0 (*)     All other components within normal limits    Narrative:     Indicate which anticoagulants the patient is on:->UNKNOWN   URINALYSIS,CULTURE IF INDICATED - Abnormal; Notable for the following components:    Leukocyte Esterase Trace (*)     All " other components within normal limits   ESTIMATED GFR - Abnormal; Notable for the following components:    GFR If Non  56 (*)     All other components within normal limits    Narrative:     Indicate which anticoagulants the patient is on:->UNKNOWN   PROTHROMBIN TIME    Narrative:     Indicate which anticoagulants the patient is on:->UNKNOWN   APTT    Narrative:     Indicate which anticoagulants the patient is on:->UNKNOWN   COD (ADULT)   TROPONIN    Narrative:     Indicate which anticoagulants the patient is on:->UNKNOWN   ABO RH CONFIRM   URINE MICROSCOPIC (W/UA)       Radiology:  DX-CHEST-PORTABLE (1 VIEW)   Final Result      No acute cardiac or pulmonary abnormalities are identified.      CT-CTA HEAD WITH & W/O-POST PROCESS   Final Result      CT angiogram of the [place] within normal limits.      CT-CTA NECK WITH & W/O-POST PROCESSING   Final Result      Mild atherosclerotic disease without evidence of significant stenosis or occlusion.      CT-CEREBRAL PERFUSION ANALYSIS   Final Result      1.  Cerebral blood flow less than 30% likely representing completed infarct = 0 mL.      2.  T Max more than 6 seconds likely representing combination of completed infarct and ischemia = 0 mL.      3.  Mismatched volume likely representing ischemic brain/penumbra = None      4.  Please note that the cerebral perfusion was performed on the limited brain tissue around the basal ganglia region. Infarct/ischemia outside the CT perfusion sections can be missed in this study.      CT-HEAD W/O   Final Result      Left parietal subdural hematoma appears acute-subacute and there is a 2 mm rightward midline shift      Age-appropriate cerebral volume loss.      Moderate white matter hypodensity is present.  This is a nonspecific finding which usually is found to represent chronic microvascular disease in patient's of this demographic.  Demyelination, age indeterminant ischemia and gliosis are also common     possibilities.      Chronic right caudate head lacunar infarction      I discussed the findings with the patient's neurologist before this interpretation was generated           ED Medications Administered:  Medications   labetalol (NORMODYNE/TRANDATE) injection 10 mg (10 mg Intravenous Given 11/29/19 1224)   hydrALAZINE (APRESOLINE) injection 20 mg (has no administration in time range)   niCARdipine (CARDENE) 25 mg in  mL Infusion (0 mg/hr Intravenous Dose not Required 11/29/19 1215)   NS infusion (has no administration in time range)   norepinephrine (LEVOPHED) 8 mg in  mL Infusion (0 mcg/min Intravenous Dose not Required 11/29/19 1215)   iohexol (OMNIPAQUE) 350 mg/mL (40 mL Intravenous Given 11/29/19 1127)   iohexol (OMNIPAQUE) 350 mg/mL (80 mL Intravenous Given 11/29/19 1136)       Differential diagnosis:  TIA, ischemic CVA, hemorrhagic CVA, electrolyte abnormality, hypoglycemia    MDM:  Patient presents as a stroke activation for right upper and lower extremity paresthesias and weakness, weakness now resolved.    On laboratory evaluation liver enzymes are within normal limits.  INR is within normal limits.  Glucose is 86, platelet count is slightly below normal at 156.  Hemoglobin is normal at 14.    CT of the head without contrast demonstrates a left parietal subdural hematoma appearing acute-subacute with 2 mm rightward midline shift.  Chronic right caudate head lacunar infarction.    Patient does not recall any falls, traumatic injuries, no acute onset headaches over the past few weeks.  On my reassessment after returning from CT, she continues to have some paresthesias in the right upper extremity, states she is having no paresthesias in the right lower extremity, nor weakness in the right lower extremity.  Blood pressure control initiated in the emergency department.    Call placed to Dr. Cabrera, on call for neurosurgery today.  He kindly agrees to review the imaging and evaluate the patient  in the emergency department.  He recommends head CT tomorrow, recommends initiation of Keppra and every 4 hours neuro checks.  He does believe the patient is stable to admit to the medical floor.  He will follow-up with the patient while hospitalized, and recommends outpatient follow-up 1 month after discharge.    Patient was seen and evaluated in conjunction with Dr. Encarnacion, neurologist.  His recommendation is against alteplase at this time due to subdural hematoma.  Recommends MRI of the brain, ICU admission, and stat head CT if any change in neurologic status.    Plan at this time is for admission to critical care unit.  Case discussed with Dr. Castro, hospitalist, who kindly agrees to admit the patient.    Patient is critically ill.   The patient continues to have: Intracranial bleed  The vital organ system that is affected is the: Neurologic  If untreated there is a high chance of deterioration into: Herniation, stroke  And eventually death.   The critical care that I am providing today is: Blood pressure control, subspecialist consultation, imaging ordering and interpretation  The critical that has been undertaken is medically complex.   There has been no overlap in critical care time.   Critical Care Time not including procedures: 40 min      Disposition:  Admit to critical care unit in critical condition.    Final Impression:  1. Subdural hematoma (HCC)    2. Paresthesias    3. Right sided weakness        Electronically signed by: Romi Madrid MD, 11/29/2019 2:15 PM

## 2019-11-29 NOTE — ED NOTES
Patient arrived to [facility] on gurney from CT. Dr. Encarnacion at bedside. Patient reports fall approx 1 month ago and is not sure if she hit her head at that time.

## 2019-11-29 NOTE — ED TRIAGE NOTES
Chief Complaint   Patient presents with   • Possible Stroke     Pt BIB EMS, possible stroke like symptoms. pt with right sided defecits, onset 1040. pt A&Ox4. BG 84 PTA

## 2019-11-29 NOTE — CONSULTS
Neurology STROKE CODE H&P  Neurohospitalist Service, [facility]    Referring Physician: Romi Madrid M.D.    STROKE CODE:   Chief Complaint   Patient presents with   • Possible Stroke     Pt BIB EMS, possible stroke like symptoms. pt with right sided defecits, onset 1040. pt A&Ox4. BG 84 PTA       To obtain the most accurate data regarding the time called, and time patient seen, refer to the stroke run-sheet and chart.  For time of CT, refer to the radiology report. See A&P below for TPA Decision and door to needle time if and when applicable.    HPI: 84-year-old female past history of hypothyroidism presenting with acute onset of right-sided numbness and right upper extremity weakness.  Symptoms started approximately 10:30 AM today.  They were improving by the patient arrival in the ER.  At the time of arrival patient only had subjective decreased sensation on the right side compared left.  Patient never had a stroke before.  No recent history of trauma.  Patient did have a fall in June of this year.  With no loss of consciousness.  Not sure if she hit her head or not at that time.  CT scan and CTA showed acute subacute 6 mm subdural over the left side.  With 2 to 3 mm shift.  CTA was unremarkable perfusion scan was unremarkable.  No history of seizures.    Onset; 10:30 AM today.  Reason no TPA left-sided subdural.    Review of systems: In addition to what is detailed in the HPI above, (and scanned into the chart if and when applicable), all other systems reviewed and are negative.    Past Medical History:    has no past medical history on file.  Hypothyroid    FHx:  family history is not on file.  No history of early strokes or head bleeds.    SHx:       Allergies:  Allergies   Allergen Reactions   • Sulfa Drugs Unspecified     unverified       Medications:    Current Facility-Administered Medications:   •  labetalol (NORMODYNE/TRANDATE) injection 10 mg, 10 mg, Intravenous, Q HOUR PRN,  "Romi Madrid M.D.  •  hydrALAZINE (APRESOLINE) injection 20 mg, 20 mg, Intravenous, Q4HRS PRN, Romi Madrid M.D.  •  niCARdipine (CARDENE) 25 mg in  mL Infusion, 2.5-15 mg/hr, Intravenous, Continuous, Romi Madrid M.D.  •  NS infusion, , Intravenous, Once PRN, Romi Madrid M.D.  •  norepinephrine (LEVOPHED) 8 mg in  mL Infusion, 1-30 mcg/min, Intravenous, Continuous, Romi Madrid M.D.    Current Outpatient Medications:   •  levothyroxine (SYNTHROID) 100 MCG Tab, Take  by mouth Every morning on an empty stomach., Disp: , Rfl:     Physical Examination:    Vitals:    11/29/19 1147 11/29/19 1204   BP: (!) 186/87    Pulse: 82    Resp: 18    SpO2: 94%    Weight:  63.5 kg (140 lb)   Height:  1.549 m (5' 1\")       General: Patient is awake and in no acute distress  Eyes: examination of optic disks not indicated at this time  CV: RRR    NEUROLOGICAL EXAM:     Mental status: Awake, alert and fully oriented, follows commands  Speech and language: speech is clear and fluent. The patient is able to name and repeat.  Cranial nerve exam: Pupils are equal, round and reactive to light bilaterally. Visual fields are full. Extraocular muscles are intact. Sensation in the face is intact to light touch. Face is symmetric. Hearing to finger rub equal. Palate elevates symmetrically. Shoulder shrug is full. Tongue is midline.  Motor exam: Strength is 5/5 in all extremities both distally and proximally. Tone is normal. No abnormal movements were seen on exam.  Sensory exam: Slight decrease to soft touch on the right upper and lower extremity.  Deep tendon reflexes:  2+ and symmetric. Toes down-going bilaterally.  Coordination: no ataxia   Gait: Normal limits  NIH Stroke Scale:    1a. Level of Consciousness (Alert, drowsy, etc): 0= Alert    1b. LOC Questions (Month, age): 0= Answers both correctly    1c. LOC Commands (Open/close eyes make fist/let go): 0= Obeys both correctly    2.   Best Gaze (Eyes open - patient " follows examiner's finger on face): 0= Normal    3.   Visual Fields (introduce visual stimulus/threat to patient's field quadrants): 0= No visual loss  4.   Facial Paresis (Show teeth, raise eyebrows and squeeze eyes shut): 0= Normal     5a. Motor Arm - Left (Elevate arm to 90 degrees if patient is sitting, 45 degrees if  supine): 0= No drift    5b. Motor Arm - Right (Elevate arm to 90 degrees if patient is sitting, 45 degrees if supine): 0= No drift    6a. Motor Leg - Left (Elevate leg 30 degrees with patient supine): 0= No drift    6b. Motor Leg - Right  (Elevate leg 30 degrees with patient supine): 0= No drift    7.   Limb Ataxia (Finger-nose, heel down shin): 0= No ataxia    8.   Sensory (Pin prick to face, arm, trunk and leg - compare side to side): 1= Partial loss    9.  Best Language (Name item, describe a picture and read sentences): 0= No aphasia    10. Dysarthria (Evaluate speech clarity by patient repeating listed words): 0= Normal articulation    11. Extinction and Inattention (Use information from prior testing to identify neglect or  double simultaneous stimuli testing): 1= Partial neglect    Total NIH Score: 2    Objective Data:    Labs:  Lab Results   Component Value Date/Time    PROTHROMBTM 13.1 11/29/2019 11:19 AM    INR 0.98 11/29/2019 11:19 AM      Lab Results   Component Value Date/Time    WBC 7.3 11/29/2019 11:19 AM    RBC 4.68 11/29/2019 11:19 AM    HEMOGLOBIN 14.0 11/29/2019 11:19 AM    HEMATOCRIT 42.2 11/29/2019 11:19 AM    MCV 90.2 11/29/2019 11:19 AM    MCH 29.9 11/29/2019 11:19 AM    MCHC 33.2 (L) 11/29/2019 11:19 AM    MPV 11.0 11/29/2019 11:19 AM    NEUTSPOLYS 61.10 11/29/2019 11:19 AM    LYMPHOCYTES 13.90 (L) 11/29/2019 11:19 AM    MONOCYTES 23.40 (H) 11/29/2019 11:19 AM    EOSINOPHILS 0.80 11/29/2019 11:19 AM    BASOPHILS 0.10 11/29/2019 11:19 AM      Lab Results   Component Value Date/Time    SODIUM 137 11/29/2019 11:19 AM    POTASSIUM 3.8 11/29/2019 11:19 AM    CHLORIDE 104  11/29/2019 11:19 AM    CO2 21 11/29/2019 11:19 AM    GLUCOSE 86 11/29/2019 11:19 AM    BUN 18 11/29/2019 11:19 AM    CREATININE 0.95 11/29/2019 11:19 AM      No results found for: CHOLSTRLTOT, LDL, HDL, TRIGLYCERIDE    Lab Results   Component Value Date/Time    ALKPHOSPHAT 61 11/29/2019 11:19 AM    ASTSGOT 17 11/29/2019 11:19 AM    ALTSGPT 7 11/29/2019 11:19 AM    TBILIRUBIN 1.0 11/29/2019 11:19 AM        Imaging/Testing:  CT-CTA HEAD WITH & W/O-POST PROCESS   Final Result      CT angiogram of the [place] within normal limits.      CT-CTA NECK WITH & W/O-POST PROCESSING   Final Result      Mild atherosclerotic disease without evidence of significant stenosis or occlusion.      CT-CEREBRAL PERFUSION ANALYSIS   Final Result      1.  Cerebral blood flow less than 30% likely representing completed infarct = 0 mL.      2.  T Max more than 6 seconds likely representing combination of completed infarct and ischemia = 0 mL.      3.  Mismatched volume likely representing ischemic brain/penumbra = None      4.  Please note that the cerebral perfusion was performed on the limited brain tissue around the basal ganglia region. Infarct/ischemia outside the CT perfusion sections can be missed in this study.      CT-HEAD W/O   Final Result      Left parietal subdural hematoma appears acute-subacute and there is a 2 mm rightward midline shift      Age-appropriate cerebral volume loss.      Moderate white matter hypodensity is present.  This is a nonspecific finding which usually is found to represent chronic microvascular disease in patient's of this demographic.  Demyelination, age indeterminant ischemia and gliosis are also common    possibilities.      Chronic right caudate head lacunar infarction      I discussed the findings with the patient's neurologist before this interpretation was generated      DX-CHEST-PORTABLE (1 VIEW)    (Results Pending)         Assessment and Plan:    84-year-old female who presented with  acute onset of right sided numbness and weakness that has partially resolved.  Patient has spontaneous subacute left sided subdural of unknown etiology at this time.  CTA head and neck were unremarkable.    Plan:  1.  Q neurochecks per protocol.  2.  MRI brain  3.  Repeat CT head stat if change in neurological status.  4.  ER physician will consult neurosurgery  5.  No antiplatelets no antic regulation  6.  Seizure precautions no need for antiepileptics at this time unless patient does have a seizure.      The evaluation of the patient, and recommended management, was discussed with the resident staff.     Romie Encarnacion MD  Board Certified Neurology, ABPN  t) [phone]

## 2019-11-29 NOTE — ASSESSMENT & PLAN NOTE
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

## 2019-11-29 NOTE — ASSESSMENT & PLAN NOTE
Likely subacute from her fall in 10/2019  Check Xray left shoulder to rule out fracture.   No evidence of edema/erythema on my exam. Limited ROM, inability to lift left arm above head or abduction/flexion

## 2019-11-29 NOTE — ED NOTES
Called and spoke with ICU RN [facility], who will accompany patient to MRI. Gave a partial phone report, will do bedside neuro exam with RN prior to transport. XR being done at bedside.

## 2019-11-30 ENCOUNTER — APPOINTMENT (OUTPATIENT)
Dept: RADIOLOGY | Facility: MEDICAL CENTER | Age: 84
DRG: 026 | End: 2019-11-30
Attending: HOSPITALIST
Payer: MEDICARE

## 2019-11-30 ENCOUNTER — APPOINTMENT (OUTPATIENT)
Dept: CARDIOLOGY | Facility: MEDICAL CENTER | Age: 84
DRG: 026 | End: 2019-11-30
Attending: HOSPITALIST
Payer: MEDICARE

## 2019-11-30 LAB
ANION GAP SERPL CALC-SCNC: 11 MMOL/L (ref 0–11.9)
BASOPHILS # BLD AUTO: 0.3 % (ref 0–1.8)
BASOPHILS # BLD: 0.02 K/UL (ref 0–0.12)
BUN SERPL-MCNC: 15 MG/DL (ref 8–22)
CALCIUM SERPL-MCNC: 8.9 MG/DL (ref 8.5–10.5)
CHLORIDE SERPL-SCNC: 107 MMOL/L (ref 96–112)
CHOLEST SERPL-MCNC: 219 MG/DL (ref 100–199)
CO2 SERPL-SCNC: 23 MMOL/L (ref 20–33)
CREAT SERPL-MCNC: 0.95 MG/DL (ref 0.5–1.4)
EOSINOPHIL # BLD AUTO: 0.09 K/UL (ref 0–0.51)
EOSINOPHIL NFR BLD: 1.3 % (ref 0–6.9)
ERYTHROCYTE [DISTWIDTH] IN BLOOD BY AUTOMATED COUNT: 48.6 FL (ref 35.9–50)
GLUCOSE SERPL-MCNC: 106 MG/DL (ref 65–99)
HCT VFR BLD AUTO: 41.4 % (ref 37–47)
HDLC SERPL-MCNC: 118 MG/DL
HGB BLD-MCNC: 13.9 G/DL (ref 12–16)
IMM GRANULOCYTES # BLD AUTO: 0.07 K/UL (ref 0–0.11)
IMM GRANULOCYTES NFR BLD AUTO: 1 % (ref 0–0.9)
LDLC SERPL CALC-MCNC: 88 MG/DL
LV EJECT FRACT  99904: 65
LV EJECT FRACT MOD 2C 99903: 85.47
LV EJECT FRACT MOD 4C 99902: 88.62
LV EJECT FRACT MOD BP 99901: 87.21
LYMPHOCYTES # BLD AUTO: 1.19 K/UL (ref 1–4.8)
LYMPHOCYTES NFR BLD: 17.6 % (ref 22–41)
MCH RBC QN AUTO: 30.2 PG (ref 27–33)
MCHC RBC AUTO-ENTMCNC: 33.6 G/DL (ref 33.6–35)
MCV RBC AUTO: 89.8 FL (ref 81.4–97.8)
MONOCYTES # BLD AUTO: 1.68 K/UL (ref 0–0.85)
MONOCYTES NFR BLD AUTO: 24.8 % (ref 0–13.4)
NEUTROPHILS # BLD AUTO: 3.72 K/UL (ref 2–7.15)
NEUTROPHILS NFR BLD: 55 % (ref 44–72)
NRBC # BLD AUTO: 0 K/UL
NRBC BLD-RTO: 0 /100 WBC
PLATELET # BLD AUTO: 152 K/UL (ref 164–446)
PMV BLD AUTO: 11 FL (ref 9–12.9)
POTASSIUM SERPL-SCNC: 3.9 MMOL/L (ref 3.6–5.5)
RBC # BLD AUTO: 4.61 M/UL (ref 4.2–5.4)
SODIUM SERPL-SCNC: 141 MMOL/L (ref 135–145)
TRIGL SERPL-MCNC: 63 MG/DL (ref 0–149)
WBC # BLD AUTO: 6.8 K/UL (ref 4.8–10.8)

## 2019-11-30 PROCEDURE — 80061 LIPID PANEL: CPT

## 2019-11-30 PROCEDURE — 70450 CT HEAD/BRAIN W/O DYE: CPT

## 2019-11-30 PROCEDURE — 99233 SBSQ HOSP IP/OBS HIGH 50: CPT | Performed by: INTERNAL MEDICINE

## 2019-11-30 PROCEDURE — A9270 NON-COVERED ITEM OR SERVICE: HCPCS | Performed by: HOSPITALIST

## 2019-11-30 PROCEDURE — 770020 HCHG ROOM/CARE - TELE (206)

## 2019-11-30 PROCEDURE — 93306 TTE W/DOPPLER COMPLETE: CPT

## 2019-11-30 PROCEDURE — 700102 HCHG RX REV CODE 250 W/ 637 OVERRIDE(OP): Performed by: HOSPITALIST

## 2019-11-30 PROCEDURE — 85025 COMPLETE CBC W/AUTO DIFF WBC: CPT

## 2019-11-30 PROCEDURE — 99232 SBSQ HOSP IP/OBS MODERATE 35: CPT | Performed by: PSYCHIATRY & NEUROLOGY

## 2019-11-30 PROCEDURE — 99233 SBSQ HOSP IP/OBS HIGH 50: CPT | Performed by: HOSPITALIST

## 2019-11-30 PROCEDURE — 80048 BASIC METABOLIC PNL TOTAL CA: CPT

## 2019-11-30 PROCEDURE — 93306 TTE W/DOPPLER COMPLETE: CPT | Mod: 26 | Performed by: INTERNAL MEDICINE

## 2019-11-30 RX ORDER — LABETALOL HYDROCHLORIDE 5 MG/ML
10-20 INJECTION, SOLUTION INTRAVENOUS EVERY 4 HOURS PRN
Status: DISCONTINUED | OUTPATIENT
Start: 2019-11-30 | End: 2019-12-04 | Stop reason: HOSPADM

## 2019-11-30 RX ADMIN — LEVETIRACETAM 500 MG: 500 TABLET ORAL at 17:40

## 2019-11-30 RX ADMIN — ATORVASTATIN CALCIUM 40 MG: 40 TABLET, FILM COATED ORAL at 17:40

## 2019-11-30 RX ADMIN — LEVETIRACETAM 500 MG: 500 TABLET ORAL at 06:12

## 2019-11-30 RX ADMIN — ACETAMINOPHEN 650 MG: 325 TABLET, FILM COATED ORAL at 23:07

## 2019-11-30 RX ADMIN — LEVOTHYROXINE SODIUM 100 MCG: 25 TABLET ORAL at 06:11

## 2019-11-30 ASSESSMENT — ENCOUNTER SYMPTOMS
NAUSEA: 0
EYE REDNESS: 0
MYALGIAS: 0
PALPITATIONS: 0
SEIZURES: 0
EYES NEGATIVE: 1
FOCAL WEAKNESS: 0
BACK PAIN: 0
WEAKNESS: 0
NEUROLOGICAL NEGATIVE: 1
DIAPHORESIS: 0
PSYCHIATRIC NEGATIVE: 1
SPEECH CHANGE: 0
GASTROINTESTINAL NEGATIVE: 1
POLYDIPSIA: 0
DIZZINESS: 0
NECK PAIN: 0
SHORTNESS OF BREATH: 0
NERVOUS/ANXIOUS: 0
CARDIOVASCULAR NEGATIVE: 1
EYE DISCHARGE: 0
DEPRESSION: 0
HALLUCINATIONS: 0
BLOOD IN STOOL: 0
RESPIRATORY NEGATIVE: 1
CHILLS: 0
FEVER: 0
VOMITING: 0
COUGH: 0
HEMOPTYSIS: 0
STRIDOR: 0
INSOMNIA: 0
HEADACHES: 0
ABDOMINAL PAIN: 0
BRUISES/BLEEDS EASILY: 0
EYE PAIN: 0
ORTHOPNEA: 0
HEARTBURN: 0

## 2019-11-30 ASSESSMENT — LIFESTYLE VARIABLES: SUBSTANCE_ABUSE: 0

## 2019-11-30 NOTE — PROGRESS NOTES
Critical Care Progress Note    Date of admission  11/29/2019    Chief Complaint  84 y.o. female admitted 11/29/2019 with right-sided numbness and right upper extremity weakness.    Hospital Course    This lady was admitted to the ICU with a subdural hematoma.      Interval Problem Update  Reviewed last 24 hour events:      She feels better today.  She denies numbness or weakness.  She does not have a headache.  She denies any visual changes.  She has no abdominal pain, nausea or vomiting.  She has no angina, palpitations or syncope.  She has no cough, sputum production, wheezing, hemoptysis or dyspnea.      Review of Systems  Review of Systems   Constitutional: Negative for chills, diaphoresis and fever.   HENT: Negative for ear discharge and tinnitus.    Eyes: Negative for pain, discharge and redness.   Respiratory: Negative for cough, hemoptysis, shortness of breath and stridor.    Cardiovascular: Negative for chest pain, palpitations and leg swelling.   Gastrointestinal: Negative for abdominal pain, blood in stool, nausea and vomiting.   Genitourinary: Negative for dysuria, hematuria and urgency.   Musculoskeletal: Negative for myalgias and neck pain.   Skin: Negative for rash.   Neurological: Negative for speech change, focal weakness, seizures and headaches.   Endo/Heme/Allergies: Does not bruise/bleed easily.   Psychiatric/Behavioral: Negative for hallucinations, substance abuse and suicidal ideas. The patient is not nervous/anxious.         Vital Signs for last 24 hours   Temp:  [36 °C (96.8 °F)-37.2 °C (99 °F)] 37 °C (98.6 °F)  Pulse:  [65-86] 76  Resp:  [13-32] 16  BP: (109-157)/() 110/56  SpO2:  [89 %-99 %] 93 %    Hemodynamic parameters for last 24 hours       Respiratory Information for the last 24 hours       Physical Exam   Physical Exam  Constitutional:       General: She is not in acute distress.     Appearance: She is not ill-appearing, toxic-appearing or diaphoretic.   HENT:      Head:  Normocephalic and atraumatic.      Right Ear: External ear normal.      Left Ear: External ear normal.      Nose: Nose normal. No congestion.      Mouth/Throat:      Mouth: Mucous membranes are moist.      Pharynx: Oropharynx is clear.   Eyes:      General: No scleral icterus.        Right eye: No discharge.         Left eye: No discharge.      Extraocular Movements: Extraocular movements intact.      Pupils: Pupils are equal, round, and reactive to light.   Neck:      Musculoskeletal: Normal range of motion and neck supple. No muscular tenderness.   Cardiovascular:      Pulses: Normal pulses.      Heart sounds: No murmur. No gallop.       Comments: Sinus rhythm  Pulmonary:      Effort: Pulmonary effort is normal. No respiratory distress.      Breath sounds: No stridor. No wheezing or rales.   Abdominal:      General: Abdomen is flat. There is no distension.      Palpations: Abdomen is soft.      Tenderness: There is no tenderness. There is no guarding.   Musculoskeletal: Normal range of motion.         General: No swelling or deformity.      Right lower leg: No edema.      Left lower leg: No edema.      Comments: No clubbing or cyanosis   Skin:     General: Skin is warm and dry.      Capillary Refill: Capillary refill takes less than 2 seconds.      Findings: No erythema.   Neurological:      General: No focal deficit present.      Mental Status: She is alert and oriented to person, place, and time.      Cranial Nerves: No cranial nerve deficit.      Motor: No weakness.      Comments: No focal weakness   Psychiatric:         Mood and Affect: Mood normal.         Behavior: Behavior normal.         Thought Content: Thought content normal.         Judgment: Judgment normal.         Medications  Current Facility-Administered Medications   Medication Dose Route Frequency Provider Last Rate Last Dose   • labetalol (NORMODYNE/TRANDATE) injection 10-20 mg  10-20 mg Intravenous Q4HRS PRN Errol Castaneda M.D.       •  levothyroxine (SYNTHROID) tablet 100 mcg  100 mcg Oral AM ES Colin Ortez M.D.   100 mcg at 11/30/19 0611   • senna-docusate (PERICOLACE or SENOKOT S) 8.6-50 MG per tablet 2 Tab  2 Tab Oral BID Colin Ortez M.D.   2 Tab at 11/29/19 1829    And   • polyethylene glycol/lytes (MIRALAX) PACKET 1 Packet  1 Packet Oral QDAY PRN Colin Ortez M.D.        And   • magnesium hydroxide (MILK OF MAGNESIA) suspension 30 mL  30 mL Oral QDAY PRN Colin Ortez M.D.        And   • bisacodyl (DULCOLAX) suppository 10 mg  10 mg Rectal QDAY PRN Colin Ortez M.D.       • acetaminophen (TYLENOL) tablet 650 mg  650 mg Oral Q6HRS PRN Colin Ortez M.D.       • ondansetron (ZOFRAN) syringe/vial injection 4 mg  4 mg Intravenous Q4HRS PRN Colin Ortez M.D.       • ondansetron (ZOFRAN ODT) dispertab 4 mg  4 mg Oral Q4HRS PRN Colin Ortez M.D.       • atorvastatin (LIPITOR) tablet 40 mg  40 mg Oral Q EVENING Colin Ortez M.D.   40 mg at 11/29/19 1829   • levETIRAcetam (KEPPRA) tablet 500 mg  500 mg Oral BID Colin Ortez M.D.   500 mg at 11/30/19 0612       Fluids    Intake/Output Summary (Last 24 hours) at 11/30/2019 1403  Last data filed at 11/30/2019 1300  Gross per 24 hour   Intake 1120 ml   Output 1425 ml   Net -305 ml       Laboratory          Recent Labs     11/29/19  1119 11/30/19  0315   SODIUM 137 141   POTASSIUM 3.8 3.9   CHLORIDE 104 107   CO2 21 23   BUN 18 15   CREATININE 0.95 0.95   CALCIUM 9.1 8.9     Recent Labs     11/29/19  1119 11/30/19  0315   ALTSGPT 7  --    ASTSGOT 17  --    ALKPHOSPHAT 61  --    TBILIRUBIN 1.0  --    GLUCOSE 86 106*     Recent Labs     11/29/19  1119 11/30/19 0315   WBC 7.3 6.8   NEUTSPOLYS 61.10 55.00   LYMPHOCYTES 13.90* 17.60*   MONOCYTES 23.40* 24.80*   EOSINOPHILS 0.80 1.30   BASOPHILS 0.10 0.30   ASTSGOT 17  --    ALTSGPT 7  --    ALKPHOSPHAT 61  --    TBILIRUBIN 1.0  --      Recent Labs     11/29/19  1119 11/30/19 0315    RBC 4.68 4.61   HEMOGLOBIN 14.0 13.9   HEMATOCRIT 42.2 41.4   PLATELETCT 156* 152*   PROTHROMBTM 13.1  --    APTT 28.8  --    INR 0.98  --        Imaging  CT:    I personally reviewed the CT of the head images.  There is a left subdural hematoma.    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 In review of yesterday's note (11/29/2019), there are no changes except as documented above.     OK to transfer out of ICU.  [facility] will sign off.  Please call if you have any questions.    Discussed patient condition and risk of morbidity and/or mortality with Hospitalist, RN, RT, Pharmacy, Charge nurse / hot rounds and QA team     Errol Castaneda MD  Pulmonary and Critical Care Medicine

## 2019-11-30 NOTE — CARE PLAN
Problem: Safety  Goal: Will remain free from injury  Outcome: PROGRESSING AS EXPECTED     Problem: Safety  Goal: Will remain free from falls  Outcome: PROGRESSING AS EXPECTED   Fall precautions in place

## 2019-11-30 NOTE — PROGRESS NOTES
Neurology Progress Note  Neurohospitalist Service, [facility]    Referring Physician: Ralph Doyle M.D.    Chief Complaint   Patient presents with   • Possible Stroke     Pt BIB EMS, possible stroke like symptoms. pt with right sided defecits, onset 1040. pt A&Ox4. BG 84 PTA       HPI: Refer to initial documented Neurology H&P, as detailed in the patient's chart.    Interval History number 30th 2019: No new events overnight.  Patient has regained strength and sensation in the right upper extremity.    Review of systems: In addition to what is detailed in the HPI and/or updated in the interval history, all other systems reviewed and are negative.    Past Medical History:    has a past medical history of Disorder of thyroid and Fall.    FHx:  family history is not on file.    SHx:   reports that she quit smoking about 54 years ago. Her smoking use included cigarettes. She smoked 0.25 packs per day. She does not have any smokeless tobacco history on file. She reports current alcohol use of about 4.2 oz of alcohol per week. She reports that she does not use drugs.    Medications:    Current Facility-Administered Medications:   •  labetalol (NORMODYNE/TRANDATE) injection 10-20 mg, 10-20 mg, Intravenous, Q4HRS PRN, Errol Castaneda M.D.  •  levothyroxine (SYNTHROID) tablet 100 mcg, 100 mcg, Oral, AM ES, Colin Ortez M.D., 100 mcg at 11/30/19 0611  •  senna-docusate (PERICOLACE or SENOKOT S) 8.6-50 MG per tablet 2 Tab, 2 Tab, Oral, BID, 2 Tab at 11/29/19 1829 **AND** polyethylene glycol/lytes (MIRALAX) PACKET 1 Packet, 1 Packet, Oral, QDAY PRN **AND** magnesium hydroxide (MILK OF MAGNESIA) suspension 30 mL, 30 mL, Oral, QDAY PRN **AND** bisacodyl (DULCOLAX) suppository 10 mg, 10 mg, Rectal, QDAY PRN, Colin Ortez M.D.  •  acetaminophen (TYLENOL) tablet 650 mg, 650 mg, Oral, Q6HRS PRN, Colin F Goldy Merhi, M.D.  •  ondansetron (ZOFRAN) syringe/vial injection 4 mg, 4 mg,  Intravenous, Q4HRS PRN, Colin Ortez M.D.  •  ondansetron (ZOFRAN ODT) dispertab 4 mg, 4 mg, Oral, Q4HRS PRN, Colin Ortez M.D.  •  atorvastatin (LIPITOR) tablet 40 mg, 40 mg, Oral, Q EVENING, Colin Ortez M.D., 40 mg at 11/29/19 1829  •  levETIRAcetam (KEPPRA) tablet 500 mg, 500 mg, Oral, BID, Colin Ortez M.D., 500 mg at 11/30/19 0612    Physical Examination:     Vitals:    11/30/19 0600 11/30/19 0700 11/30/19 0800 11/30/19 0900   BP: 111/68 136/67 123/66 142/60   Pulse: 67 72 76 86   Resp: (!) 22 (!) 32 (!) 27 (!) 27   Temp: 36 °C (96.8 °F)  37.2 °C (99 °F)    TempSrc: Oral  Temporal    SpO2: 95% 92% 94% 94%   Weight: 62.1 kg (136 lb 14.5 oz)      Height:           General: Patient is awake and in no acute distress  Eyes: examination of optic disks not indicated at this time  CV: RRR    NEUROLOGICAL EXAM:     Mental status: Awake, alert and fully oriented, follows commands  Speech and language: speech is clear and fluent. The patient is able to name and repeat.  Cranial nerve exam: Pupils are equal, round and reactive to light bilaterally. Visual fields are full. Extraocular muscles are intact. Sensation in the face is intact to light touch. Face is symmetric. Hearing to finger rub equal. Palate elevates symmetrically. Shoulder shrug is full. Tongue is midline.  Motor exam: Strength is 5/5 in all extremities both distally and proximally. Tone is normal. No abnormal movements were seen on exam.  Sensory exam: No sensory deficits identified   Deep tendon reflexes:  2+ and symmetric. Toes down-going bilaterally.  Coordination: no ataxia       Objective Data:    Labs:  Lab Results   Component Value Date/Time    PROTHROMBTM 13.1 11/29/2019 11:19 AM    INR 0.98 11/29/2019 11:19 AM      Lab Results   Component Value Date/Time    WBC 6.8 11/30/2019 03:15 AM    RBC 4.61 11/30/2019 03:15 AM    HEMOGLOBIN 13.9 11/30/2019 03:15 AM    HEMATOCRIT 41.4 11/30/2019 03:15 AM    MCV 89.8 11/30/2019  03:15 AM    MCH 30.2 11/30/2019 03:15 AM    MCHC 33.6 11/30/2019 03:15 AM    MPV 11.0 11/30/2019 03:15 AM    NEUTSPOLYS 55.00 11/30/2019 03:15 AM    LYMPHOCYTES 17.60 (L) 11/30/2019 03:15 AM    MONOCYTES 24.80 (H) 11/30/2019 03:15 AM    EOSINOPHILS 1.30 11/30/2019 03:15 AM    BASOPHILS 0.30 11/30/2019 03:15 AM      Lab Results   Component Value Date/Time    SODIUM 141 11/30/2019 03:15 AM    POTASSIUM 3.9 11/30/2019 03:15 AM    CHLORIDE 107 11/30/2019 03:15 AM    CO2 23 11/30/2019 03:15 AM    GLUCOSE 106 (H) 11/30/2019 03:15 AM    BUN 15 11/30/2019 03:15 AM    CREATININE 0.95 11/30/2019 03:15 AM      Lab Results   Component Value Date/Time    CHOLSTRLTOT 219 (H) 11/30/2019 03:15 AM    LDL 88 11/30/2019 03:15 AM     11/30/2019 03:15 AM    TRIGLYCERIDE 63 11/30/2019 03:15 AM       Lab Results   Component Value Date/Time    ALKPHOSPHAT 61 11/29/2019 11:19 AM    ASTSGOT 17 11/29/2019 11:19 AM    ALTSGPT 7 11/29/2019 11:19 AM    TBILIRUBIN 1.0 11/29/2019 11:19 AM        Imaging/Testing:  MR-BRAIN-W/O   Final Result   Addendum 1 of 1   Addendum:   Findings are discussed with the neurosurgeon Dr. Romie Cabrera. She is a    candidate for middle meningeal artery embolization to prevent recurrence.    Please consult interventional radiology.      Final      DX-SHOULDER 2+ LEFT   Final Result      Unremarkable shoulder series.                  INTERPRETING LOCATION:  [address] [place] [address], [id number]      DX-CHEST-PORTABLE (1 VIEW)   Final Result      No acute cardiac or pulmonary abnormalities are identified.      CT-CTA HEAD WITH & W/O-POST PROCESS   Final Result      CT angiogram of the [place] within normal limits.      CT-CTA NECK WITH & W/O-POST PROCESSING   Final Result      Mild atherosclerotic disease without evidence of significant stenosis or occlusion.      CT-CEREBRAL PERFUSION ANALYSIS   Final Result      1.  Cerebral blood flow less than 30% likely representing completed infarct = 0 mL.      2.  T Max  more than 6 seconds likely representing combination of completed infarct and ischemia = 0 mL.      3.  Mismatched volume likely representing ischemic brain/penumbra = None      4.  Please note that the cerebral perfusion was performed on the limited brain tissue around the basal ganglia region. Infarct/ischemia outside the CT perfusion sections can be missed in this study.      CT-HEAD W/O   Final Result      Left parietal subdural hematoma appears acute-subacute and there is a 2 mm rightward midline shift      Age-appropriate cerebral volume loss.      Moderate white matter hypodensity is present.  This is a nonspecific finding which usually is found to represent chronic microvascular disease in patient's of this demographic.  Demyelination, age indeterminant ischemia and gliosis are also common    possibilities.      Chronic right caudate head lacunar infarction      I discussed the findings with the patient's neurologist before this interpretation was generated      CT-HEAD W/O    (Results Pending)         Assessment and Plan:    84-year-old female presenting with right sided numbness and weakness that has resolved.  Acute/subacute left-sided subdural with small amount of subarachnoid hemorrhage on the left side.  Plan is for neurosurgery to perform a left middle cerebral embolization on Monday.  Patient is currently on Keppra 5 mg twice daily for seizure prophylaxis per neurosurgery.  At this time I do not have any further recommendations from neurological standpoint.  Please reconsult if any new issues arises.      The evaluation of the patient, and recommended management, was discussed with the resident staff. I have performed a physical exam and reviewed and updated ROS and Plan today (11/30/2019). In review of yesterday's note (11/29/2019), there are no changes except as documented above.    Romie Encarnacion MD  Board Certified Neurology, ABPN  t) [phone]

## 2019-11-30 NOTE — PROGRESS NOTES
[facility] Medicine Daily Progress Note    Date of Service  11/30/2019    Chief Complaint  84 y.o. female admitted 11/29/2019 with right-sided weakness and numbness.  The patient was admitted on 11/29, around 10:30 AM she noted right-sided weakness and associated numbness and tingling in the right facial structure.  This lasted for approximately 2 hours, the patient presented to the emergency room and her symptoms slowly subsided, several weeks ago she stated she fell out of bed and might of had a head injury as well as hurting her left shoulder which she has had difficulties in the past.  The patient denies fevers or chills, she denies headache.  The patient was found with a small amount of subdural hematoma as well as subarachnoid hemorrhage, the subdural is approximately 7 mm thick in diameter, there is a 3 to 4 mm midline shift from the left to the right.  The patient referred to neurosurgery as well as neurology and admitted to ICU with every hour [facility] Course    Admitted to ICU with a subdural hematoma, subarachnoid hemorrhage following a fall and head injury      Interval Problem Update  Patient seen and examined today. ICU Care  Care and plan discussed in IDT/Hot rounds.  Lines and assistive devices reviewed.    Patient tolerating treatment and therapies.  All Data, Medication data reviewed.  Case discussed with nursing as available.  Plan of Care reviewed with patient and notified of changes.  11/30 the patient feels better, she denies headache, she denies residual neurologic deficits as numbness or weakness or tingling, no visual changes, the patient for repeat CT scanning and if no change is clear to go to the neurosurgical unit.  Patient's left shoulder still hurts but overall is improved.  Plan from neurology neurosurgery is reviewed.  Consultants/Specialty  Pulmonary critical care  Neurosurgery  Neurology    Code Status  Full code    Disposition  ICU with transfer to neurosurgical  unit    Review of Systems  Review of Systems   Constitutional: Positive for malaise/fatigue. Negative for chills and fever.   HENT: Negative.    Eyes: Negative.    Respiratory: Negative.  Negative for cough.    Cardiovascular: Negative.  Negative for chest pain, palpitations and orthopnea.   Gastrointestinal: Negative.  Negative for heartburn, nausea and vomiting.   Genitourinary: Negative.  Negative for dysuria and frequency.   Musculoskeletal: Positive for joint pain. Negative for back pain and neck pain.   Skin: Negative.  Negative for itching and rash.   Neurological: Negative.  Negative for dizziness, focal weakness, weakness and headaches.   Endo/Heme/Allergies: Negative.  Negative for polydipsia. Does not bruise/bleed easily.   Psychiatric/Behavioral: Negative.  Negative for depression, hallucinations and substance abuse. The patient is not nervous/anxious and does not have insomnia.         Physical Exam  Temp:  [36 °C (96.8 °F)-36.6 °C (97.8 °F)] 36 °C (96.8 °F)  Pulse:  [65-82] 72  Resp:  [13-32] 32  BP: (111-186)/() 136/67  SpO2:  [89 %-99 %] 92 %    Physical Exam  Vitals signs and nursing note reviewed.   Constitutional:       Appearance: She is well-developed. She is not diaphoretic.   HENT:      Head: Normocephalic and atraumatic.      Nose: Nose normal.   Eyes:      Conjunctiva/sclera: Conjunctivae normal.      Pupils: Pupils are equal, round, and reactive to light.   Neck:      Musculoskeletal: Normal range of motion and neck supple.      Thyroid: No thyromegaly.      Vascular: No JVD.   Cardiovascular:      Rate and Rhythm: Normal rate and regular rhythm.      Heart sounds: Normal heart sounds. No friction rub. No gallop.    Pulmonary:      Effort: Pulmonary effort is normal.      Breath sounds: Normal breath sounds. No wheezing or rales.   Abdominal:      General: Bowel sounds are normal. There is no distension.      Palpations: Abdomen is soft. There is no mass.      Tenderness: There is no  tenderness. There is no guarding or rebound.   Musculoskeletal:         General: Tenderness and signs of injury present.      Comments: Left shoulder pain full range of motion   Lymphadenopathy:      Cervical: No cervical adenopathy.   Skin:     General: Skin is warm and dry.   Neurological:      Mental Status: She is alert and oriented to person, place, and time.      Cranial Nerves: No cranial nerve deficit.   Psychiatric:         Behavior: Behavior normal.         Fluids    Intake/Output Summary (Last 24 hours) at 11/30/2019 0757  Last data filed at 11/30/2019 0600  Gross per 24 hour   Intake 400 ml   Output 1075 ml   Net -675 ml       Laboratory  Recent Labs     11/29/19  1119 11/30/19  0315   WBC 7.3 6.8   RBC 4.68 4.61   HEMOGLOBIN 14.0 13.9   HEMATOCRIT 42.2 41.4   MCV 90.2 89.8   MCH 29.9 30.2   MCHC 33.2* 33.6   RDW 48.3 48.6   PLATELETCT 156* 152*   MPV 11.0 11.0     Recent Labs     11/29/19  1119 11/30/19  0315   SODIUM 137 141   POTASSIUM 3.8 3.9   CHLORIDE 104 107   CO2 21 23   GLUCOSE 86 106*   BUN 18 15   CREATININE 0.95 0.95   CALCIUM 9.1 8.9     Recent Labs     11/29/19  1119   APTT 28.8   INR 0.98         Recent Labs     11/30/19  0315   TRIGLYCERIDE 63      LDL 88       Imaging  EC-ECHOCARDIOGRAM COMPLETE W/O CONT         CT-HEAD W/O   Final Result      1.  No significant change in left convexity subacute subdural hematoma measuring maximum of 9 mm thickness      2.  Minimal mass effect with 2-3 mm left-to-right shift      3.  Underlying cerebral volume loss and white matter change      MR-BRAIN-W/O   Final Result   Addendum 1 of 1   Addendum:   Findings are discussed with the neurosurgeon Dr. Romie Cabrera. She is a    candidate for middle meningeal artery embolization to prevent recurrence.    Please consult interventional radiology.      Final      DX-SHOULDER 2+ LEFT   Final Result      Unremarkable shoulder series.                  INTERPRETING LOCATION:  [address] [place] [address], [id number]       DX-CHEST-PORTABLE (1 VIEW)   Final Result      No acute cardiac or pulmonary abnormalities are identified.      CT-CTA HEAD WITH & W/O-POST PROCESS   Final Result      CT angiogram of the [place] within normal limits.      CT-CTA NECK WITH & W/O-POST PROCESSING   Final Result      Mild atherosclerotic disease without evidence of significant stenosis or occlusion.      CT-CEREBRAL PERFUSION ANALYSIS   Final Result      1.  Cerebral blood flow less than 30% likely representing completed infarct = 0 mL.      2.  T Max more than 6 seconds likely representing combination of completed infarct and ischemia = 0 mL.      3.  Mismatched volume likely representing ischemic brain/penumbra = None      4.  Please note that the cerebral perfusion was performed on the limited brain tissue around the basal ganglia region. Infarct/ischemia outside the CT perfusion sections can be missed in this study.      CT-HEAD W/O   Final Result      Left parietal subdural hematoma appears acute-subacute and there is a 2 mm rightward midline shift      Age-appropriate cerebral volume loss.      Moderate white matter hypodensity is present.  This is a nonspecific finding which usually is found to represent chronic microvascular disease in patient's of this demographic.  Demyelination, age indeterminant ischemia and gliosis are also common    possibilities.      Chronic right caudate head lacunar infarction      I discussed the findings with the patient's neurologist before this interpretation was generated           Assessment/Plan  * Subdural hematoma (HCC)  Assessment & Plan  Likely related to her fall in October  Patient is of being evaluated by neurosurgery as well as neurology  On [facility] per neurosurgery recommendations  CT scan follow-up is negative, the patient transferred to neurosurgery with plans for meningeal artery embolization on Monday      Murmur, cardiac- (present on admission)  Assessment & Plan  Check  echocardiogram    Acquired hypothyroidism  Assessment & Plan  Continue replacement therapy    TIA (transient ischemic attack)  Assessment & Plan  Right-sided weakness could be secondary to TIA versus possible seizure related to subdural hematoma  She was evaluated by neurology  Dr Encarnacion recommended every hour neurochecks with a stat CT if any changes in neurologic status as well as MRI of brain  We will hold off on aspirin given subdural hematoma  We will start her on atorvastatin check lipid panel given old lacunar infarcts noted on CT    Left shoulder pain  Assessment & Plan  X-ray reviewed negative for fracture  The patient may need to follow-up as an outpatient with STEPHANY to further evaluate     Plan  Follow-up CT scan is stable, transfer the patient to neurosurgical unit  Continue every 4 neurochecks  Complete work-up including echocardiogram and ongoing telemetry monitoring  IR intervention planned for Monday  Close monitoring  PT OT eval  Blood pressure control  Continue with AED  See orders  Medically complex high risk  VTE prophylaxis: SCD

## 2019-11-30 NOTE — PROGRESS NOTES
Neurosurgery Progress Note    Subjective:  No complaints.  HA 0/10    Exam:  Awake, alert   Speech fluent appropriate  In no apparent distress  Affect, mood appropriate  Oriented x 3  Pupils 3 mm midline, reactive.  Conjugate gaze.  Visual fields full to confrontation  Face symmetric  Tongue midline without fasciculation  Facial sensation intact light touch  Hearing intact to conversation, light finger rub bilaterally  Motor:  Bilateral SCM, shoulder shrug, deltoid, bicep, tricep, , wrist extension, hand intrinsics                IP, thigh adduction, thigh abduction, quadriceps, hamstrings, dorsiflexion,                Plantar flexion, foot inversion, foot eversion, EHL 5/5 no pronator drift  Sensation:  Intact touch face, neck, torso, four extremities  Reflex:  No Vick's no clonus  Finger-nose-finger, FLAVIO unremarkable    BP  Min: 111/68  Max: 186/87  Pulse  Av.2  Min: 65  Max: 82  Resp  Av.7  Min: 13  Max: 32  Temp  Av.3 °C (97.4 °F)  Min: 36 °C (96.8 °F)  Max: 36.6 °C (97.8 °F)  SpO2  Av.6 %  Min: 89 %  Max: 99 %    No data recorded    Recent Labs     19  11119  0315   WBC 7.3 6.8   RBC 4.68 4.61   HEMOGLOBIN 14.0 13.9   HEMATOCRIT 42.2 41.4   MCV 90.2 89.8   MCH 29.9 30.2   MCHC 33.2* 33.6   RDW 48.3 48.6   PLATELETCT 156* 152*   MPV 11.0 11.0     Recent Labs     19  1119 19  0315   SODIUM 137 141   POTASSIUM 3.8 3.9   CHLORIDE 104 107   CO2 21 23   GLUCOSE 86 106*   BUN 18 15   CREATININE 0.95 0.95   CALCIUM 9.1 8.9     Recent Labs     19   APTT 28.8   INR 0.98           Intake/Output       19 - 1959 19 - 1959      8680-4121 6574-8109 Total  0758-5166 Total       Intake    P.O.  --  400 400  --  -- --    P.O. -- 400 400 -- -- --    Total Intake -- 400 400 -- -- --       Output    Urine  --  1673 1953  --  -- --    Number of Times Voided -- 1 x 1 x -- -- --    Urine Void (mL) -- 5652 1272 -- -- --     Stool  --  -- --  --  -- --    Number of Times Stooled -- 1 x 1 x -- -- --    Total Output -- 1075 1075 -- -- --       Net I/O     -- -675 -675 -- -- --            Intake/Output Summary (Last 24 hours) at 11/30/2019 0748  Last data filed at 11/30/2019 0600  Gross per 24 hour   Intake 400 ml   Output 1075 ml   Net -675 ml            • levothyroxine  100 mcg AM ES   • senna-docusate  2 Tab BID    And   • polyethylene glycol/lytes  1 Packet QDAY PRN    And   • magnesium hydroxide  30 mL QDAY PRN    And   • bisacodyl  10 mg QDAY PRN   • acetaminophen  650 mg Q6HRS PRN   • labetalol  10 mg Q4HRS PRN   • ondansetron  4 mg Q4HRS PRN   • ondansetron  4 mg Q4HRS PRN   • atorvastatin  40 mg Q EVENING   • levETIRAcetam  500 mg BID     MR-BRAIN-W/O   Final Result   Addendum 1 of 1   Addendum:   Findings are discussed with the neurosurgeon Dr. Romie Cabrera. She is a    candidate for middle meningeal artery embolization to prevent recurrence.    Please consult interventional radiology.      Final      DX-SHOULDER 2+ LEFT   Final Result      Unremarkable shoulder series.                  INTERPRETING LOCATION:  [address] [place] [address], [phone]      DX-CHEST-PORTABLE (1 VIEW)   Final Result      No acute cardiac or pulmonary abnormalities are identified.      CT-CTA HEAD WITH & W/O-POST PROCESS   Final Result      CT angiogram of the [place] within normal limits.      CT-CTA NECK WITH & W/O-POST PROCESSING   Final Result      Mild atherosclerotic disease without evidence of significant stenosis or occlusion.      CT-CEREBRAL PERFUSION ANALYSIS   Final Result      1.  Cerebral blood flow less than 30% likely representing completed infarct = 0 mL.      2.  T Max more than 6 seconds likely representing combination of completed infarct and ischemia = 0 mL.      3.  Mismatched volume likely representing ischemic brain/penumbra = None      4.  Please note that the cerebral perfusion was performed on the limited brain tissue around  the basal ganglia region. Infarct/ischemia outside the CT perfusion sections can be missed in this study.      CT-HEAD W/O   Final Result      Left parietal subdural hematoma appears acute-subacute and there is a 2 mm rightward midline shift      Age-appropriate cerebral volume loss.      Moderate white matter hypodensity is present.  This is a nonspecific finding which usually is found to represent chronic microvascular disease in patient's of this demographic.  Demyelination, age indeterminant ischemia and gliosis are also common    possibilities.      Chronic right caudate head lacunar infarction      I discussed the findings with the patient's neurologist before this interpretation was generated      CT-HEAD W/O    (Results Pending)         Assessment and Plan:  Hospital day #2  POD# 0  Chemical prophylactic DVT therapy: no  Start date/time: no      Plan:  Floor if medicine/ICU ok  Can eat and drink  Embolization Monday.  NPO 12 mn Monday  Q4hrly neuro checks  Ambulate as tolerated  OT/PT/Speech

## 2019-11-30 NOTE — CARE PLAN
Problem: Safety  Goal: Will remain free from injury  Outcome: PROGRESSING AS EXPECTED  Note:   Patient follows safety plan and asks for assistance appropriately.   Goal: Will remain free from falls  Outcome: PROGRESSING AS EXPECTED     Problem: Venous Thromboembolism (VTW)/Deep Vein Thrombosis (DVT) Prevention:  Goal: Patient will participate in Venous Thrombosis (VTE)/Deep Vein Thrombosis (DVT)Prevention Measures  Outcome: PROGRESSING AS EXPECTED  Note:   Patient wearing SCDs; pharmacological intervention contraindicated.

## 2019-11-30 NOTE — ASSESSMENT & PLAN NOTE
Likely related to her fall in October  Patient is of being evaluated by neurosurgery as well as neurology  On [facility] per neurosurgery recommendations  underwent meningeal artery embolization today  Tolerated procedure very well.  CT head yesterday did not show changes.  PT/OT eval

## 2019-11-30 NOTE — ASSESSMENT & PLAN NOTE
X-ray reviewed negative for fracture  The patient may need to follow-up as an outpatient with STEPHANY to further evaluate

## 2019-11-30 NOTE — DISCHARGE PLANNING
[facility] Transitional Care Coordination     Referral from: Dr. Goldy Ortez     Facesheet indicates: Medicare/GEHA-ASA    Potential Rehab Diagnosis: SDH    Chart review indicates ongoing medical management needs.  It is too soon to determine if patient will have therapy needs to meet CMS criteria for inpatient rehab with the goal of returning to community.    D/C support: Needs clarification     Physiatry consultation pended per protocol.      CT head - Left parietal subdural hematoma appears acute-subacute. Candidate for middle meningeal artery embolization to prevent recurrence.  Embolization Monday. Will follow for PT/OT - as clinically appropriate s/p procedure Monday - to assist with determination for post acute needs. TCC monitoring.    Thank you for the referral.

## 2019-11-30 NOTE — ASSESSMENT & PLAN NOTE
Right-sided weakness could be secondary to TIA versus possible seizure related to subdural hematoma  She was evaluated by neurology  Dr Encarnacion recommended every hour neurochecks with a stat CT if any changes in neurologic status as well as MRI of brain  We will hold off on aspirin given subdural hematoma  We will start her on atorvastatin   Pending further recommendation per neurosurgery  Improving

## 2019-11-30 NOTE — CONSULTS
DATE OF SERVICE:  11/29/2019    NEUROSURGERY CONSULTATION    REASON FOR CONSULTATION:  Left subdural hematoma.    HISTORY OF PRESENT ILLNESS:  This is a woman born in 1935 who presents to   [facility] after having onset of right-sided weakness,   numbness, and some speech difficulties this afternoon that have subsequently   resolved.  She has had concern for stroke and was brought in for stroke   protocol.  On the stroke protocol, she was found to have a left-sided subdural   hematoma that was approximately 7-9 mm in total thickness at the convexity   and was causing approximately 3 mm of midline shift from left to right.  She   has, after significant questioning, had a fall approximately a month ago   during October shortly after moving in with her daughter where she fell out of   bed onto a hardwood floor and subsequently had unremitting headaches.  After   that episode, the patient stated that she did not have any other issues and   her headaches slightly resolved since then.  Aside from this, she has no other   trauma history and is not on any blood thinners.  The blood clot is   approximately the same density as her brain tissue, which makes it fit with   that approximate chronicity of potential cause for her subdural.  At this   time, she still has some evaluation pending for her stroke workup such as an   MRI and she was getting admitted to the hospital medicine service who likely   pending a negative stroke evaluation on MRI will be admitted to the floor for   q. 4 hour neuro checks until her repeat head CT is completed dictating a   stable blood clot, which is likely the case.    REVIEW OF SYSTEMS:  A 12-point review of systems is negative with the   exception of findings in the HPI.  She denies any bowel or bladder   incontinence.  She no longer has her focal neurologic deficit.  Her weakness   is gone.  Her numbness has resolved and she has complaints no longer of any   headaches or  speech finding difficulties.    PAST MEDICAL HISTORY:  The patient is not on any blood thinners and has not   had any blood clot history.  Please see EMR for the residual.    PAST SURGICAL HISTORY:  Please see EMR.    MEDICATIONS:  The patient denies any blood thinners, no aspirin, Plavix,   ibuprofen at this time.    PHYSICAL EXAMINATION:  GENERAL:  The patient is alert.  She is oriented x4.  She is able to answer   questions appropriately.  She has no signs of dysarthria or aphasia.  HEENT:  Atraumatic, normocephalic.  PULMONARY:  Normal work of breathing.  CARDIOVASCULAR:  2+ pulses.  NEUROLOGIC:  Cranial nerves are grossly intact.  She has full range of   extraocular muscles.  No nystagmus.  Pupils equal, round, reactive to light.    She has 4-2 pupils.  Her muscle of facial expression and mastication intact,   symmetric.  Uvula midline.  Tongue midline.  Sternocleidomastoid and shrug are   5/5 strength.  She has no pronator drift.  No ataxia, no dysmetria in her   bilateral upper extremities.  She has 5/5 strength, 2/2 sensation, 2+ reflexes   with the exception of her shoulder girdle on the left is painful for her.    She has a torn rotator cuff, which limits her ability to push the examiner   away.  Bilateral lower extremities are 5/5 strength, 2/2 sensation, and 2+   reflexes.    IMAGING:  The patient has a CT head with and without contrast for CT   perfusion, which demonstrates a convexity left subdural hematoma, which had   its maximal thicknesses approximately 7 mm in diameter.  She also has   approximately 3-4 mm of midline shift from left to right.  There is no   cervical effacement that can be detected; however, the suboptimal image for   examining the finer structures of the brain as well as for sulcal effacement   as this is a CT perfusion and not a dedicated CT head noncontrast.  The   patient is due to have an MRI without contrast later today, which will be more   definitive on mass effect on the  sulci.    ASSESSMENT AND PLAN:  This is a lady born in 1935, who presents with concern   for stroke.  Her initial Stroke evaluation is negative.  Her symptoms have   been transient from her original presentation, which was Right-sided weakness   and numbness with speech arrest.  She now is completely at her baseline.  She   does not complain of any headaches.  Her current CT scan does not demonstrate   any significant mass effect on the brain.  She has an isodense to brain blood   clot that is located on the left side over the convexity of the brain that   could contribute to weakness, numbness, or partial seizure.  If her stroke   workup is completely negative, it is likely the patient had a partial seizure   that contributed to her symptoms and we would like her to be treated with   Keppra 500 mg q. 12.  She should also have a repeat head CT tomorrow.  If her   stroke workup was completely negative given her story of approximately a   1-month-old trauma and isodense subdural hematoma that is approximately back   in chronicity, she can be placed on the floor with q. 4 hour neuro checks and   repeat head CT in the morning.  If the repeat head CT is completely stable and   the patient is doing well without any residual symptoms and her stroke workup   is completely negative, then we would have her follow up in approximately a   month with a repeat head CT to see if she has continued aging of the clot and   at that time she would be a candidate for potentially removal of the subdural   through bur holes, which would be much less of a surgical impact on the   patient than what would be required it now with a mini craniotomy for   evacuation of hematoma.  Currently, she is nonfocal and we feel that this   would be ____ surgical plan for the patient.  Please call with any questions.       ____________________________________     MD DIETER Diana / JAKE    DD:  11/29/2019 14:06:11  DT:  11/29/2019  16:43:03    D#:  [id number]  Job#:  [id number]

## 2019-11-30 NOTE — PROGRESS NOTES
No events overnight. VSS. Neuro exam WNL; no appreciable strength disparities. Denies numbness/tingling, pain, weakness. AOx3. Calm and cooperative.

## 2019-11-30 NOTE — PROGRESS NOTES
Rn in ER to receive patient. Primary RN off unit bedside report rcvd from breaking Rn. Bedside Neuro exam completed. No neuro deficits. VSS.  Patient placed on monitor and taken directly to MRI.

## 2019-12-01 ENCOUNTER — APPOINTMENT (OUTPATIENT)
Dept: RADIOLOGY | Facility: MEDICAL CENTER | Age: 84
DRG: 026 | End: 2019-12-01
Attending: INTERNAL MEDICINE
Payer: MEDICARE

## 2019-12-01 PROCEDURE — 70450 CT HEAD/BRAIN W/O DYE: CPT

## 2019-12-01 PROCEDURE — A9270 NON-COVERED ITEM OR SERVICE: HCPCS | Performed by: HOSPITALIST

## 2019-12-01 PROCEDURE — 770020 HCHG ROOM/CARE - TELE (206)

## 2019-12-01 PROCEDURE — 700102 HCHG RX REV CODE 250 W/ 637 OVERRIDE(OP): Performed by: HOSPITALIST

## 2019-12-01 PROCEDURE — 700105 HCHG RX REV CODE 258: Performed by: INTERNAL MEDICINE

## 2019-12-01 PROCEDURE — 99233 SBSQ HOSP IP/OBS HIGH 50: CPT | Performed by: INTERNAL MEDICINE

## 2019-12-01 RX ORDER — SODIUM CHLORIDE 9 MG/ML
INJECTION, SOLUTION INTRAVENOUS CONTINUOUS
Status: DISCONTINUED | OUTPATIENT
Start: 2019-12-01 | End: 2019-12-04 | Stop reason: HOSPADM

## 2019-12-01 RX ORDER — SODIUM CHLORIDE AND POTASSIUM CHLORIDE 150; 900 MG/100ML; MG/100ML
INJECTION, SOLUTION INTRAVENOUS CONTINUOUS
Status: DISCONTINUED | OUTPATIENT
Start: 2019-12-01 | End: 2019-12-01

## 2019-12-01 RX ADMIN — LEVOTHYROXINE SODIUM 100 MCG: 25 TABLET ORAL at 04:04

## 2019-12-01 RX ADMIN — ATORVASTATIN CALCIUM 40 MG: 40 TABLET, FILM COATED ORAL at 16:25

## 2019-12-01 RX ADMIN — SODIUM CHLORIDE: 9 INJECTION, SOLUTION INTRAVENOUS at 23:53

## 2019-12-01 RX ADMIN — LEVETIRACETAM 500 MG: 500 TABLET ORAL at 16:25

## 2019-12-01 RX ADMIN — LEVETIRACETAM 500 MG: 500 TABLET ORAL at 04:04

## 2019-12-01 ASSESSMENT — ENCOUNTER SYMPTOMS
EYE PAIN: 0
RESPIRATORY NEGATIVE: 1
SENSORY CHANGE: 1
ABDOMINAL PAIN: 0
ORTHOPNEA: 0
DEPRESSION: 0
TREMORS: 0
TINGLING: 1
NAUSEA: 0
CONSTIPATION: 0
CARDIOVASCULAR NEGATIVE: 1
POLYDIPSIA: 0
NECK PAIN: 0
FOCAL WEAKNESS: 0
SHORTNESS OF BREATH: 0
FALLS: 0
WHEEZING: 0
SPEECH CHANGE: 0
INSOMNIA: 0
SPUTUM PRODUCTION: 0
PALPITATIONS: 0
SEIZURES: 0
HEARTBURN: 0
FEVER: 0
PND: 0
HALLUCINATIONS: 0
NERVOUS/ANXIOUS: 0
PSYCHIATRIC NEGATIVE: 1
BLURRED VISION: 0
DIARRHEA: 0
BRUISES/BLEEDS EASILY: 0
VOMITING: 0
EYES NEGATIVE: 1
WEIGHT LOSS: 0
BACK PAIN: 0
HEADACHES: 0
DIZZINESS: 0
GASTROINTESTINAL NEGATIVE: 1
COUGH: 0
CHILLS: 0
WEAKNESS: 0

## 2019-12-01 ASSESSMENT — LIFESTYLE VARIABLES: SUBSTANCE_ABUSE: 0

## 2019-12-01 NOTE — PROGRESS NOTES
[facility] Medicine Daily Progress Note    Date of Service  12/1/2019    Chief Complaint  84 y.o. female admitted 11/29/2019 with right-sided weakness and numbness.  The patient was admitted on 11/29, around 10:30 AM she noted right-sided weakness and associated numbness and tingling in the right facial structure.  This lasted for approximately 2 hours, the patient presented to the emergency room and her symptoms slowly subsided, several weeks ago she stated she fell out of bed and might of had a head injury as well as hurting her left shoulder which she has had difficulties in the past.  The patient denies fevers or chills, she denies headache.  The patient was found with a small amount of subdural hematoma as well as subarachnoid hemorrhage, the subdural is approximately 7 mm thick in diameter, there is a 3 to 4 mm midline shift from the left to the right.  The patient referred to neurosurgery as well as neurology and [facility]    [facility]    Admitted to ICU with a subdural hematoma, subarachnoid hemorrhage following a fall and head injury      Interval Problem Update  12/1.  Patient has been stable overnight on the neuro floor.  No significant overnight acute event.  Patient did complain new onset of right hand numbness.  We ordered a stat CT.  Patient complains of general body achiness. Patient's pain is general 2-3/10, intermittent and does not radiate to other location, sharp and with some tingling.      Consultants/Specialty  Pulmonary critical care  Neurosurgery  Neurology    Code Status  Full code    Disposition  TBD    Review of Systems  Review of Systems   Constitutional: Positive for malaise/fatigue. Negative for chills, fever and weight loss.   HENT: Negative.  Negative for congestion, ear discharge, ear pain, hearing loss and nosebleeds.    Eyes: Negative.  Negative for blurred vision and pain.   Respiratory: Negative.  Negative for cough, sputum production,  shortness of breath and wheezing.    Cardiovascular: Negative.  Negative for chest pain, palpitations, orthopnea, leg swelling and PND.   Gastrointestinal: Negative.  Negative for abdominal pain, constipation, diarrhea, heartburn, nausea and vomiting.   Genitourinary: Negative.  Negative for dysuria, frequency and hematuria.   Musculoskeletal: Positive for joint pain. Negative for back pain, falls and neck pain.   Skin: Negative.  Negative for itching and rash.   Neurological: Positive for tingling and sensory change. Negative for dizziness, tremors, speech change, focal weakness, seizures, weakness and headaches.   Endo/Heme/Allergies: Negative.  Negative for polydipsia. Does not bruise/bleed easily.   Psychiatric/Behavioral: Negative.  Negative for depression, hallucinations, substance abuse and suicidal ideas. The patient is not nervous/anxious and does not have insomnia.         Physical Exam  Temp:  [36.4 °C (97.5 °F)-37.2 °C (99 °F)] 36.7 °C (98.1 °F)  Pulse:  [71-99] 81  Resp:  [16-20] 16  BP: ()/(49-66) 115/60  SpO2:  [91 %-95 %] 92 %    Physical Exam  Vitals signs and nursing note reviewed.   Constitutional:       General: She is not in acute distress.     Appearance: Normal appearance. She is well-developed and normal weight. She is not diaphoretic.   HENT:      Head: Normocephalic and atraumatic.      Right Ear: Tympanic membrane, ear canal and external ear normal.      Left Ear: Tympanic membrane, ear canal and external ear normal.      Nose: Nose normal.      Mouth/Throat:      Mouth: Mucous membranes are moist.      Pharynx: Oropharynx is clear.   Eyes:      Extraocular Movements: Extraocular movements intact.      Conjunctiva/sclera: Conjunctivae normal.      Pupils: Pupils are equal, round, and reactive to light.   Neck:      Musculoskeletal: Normal range of motion and neck supple. No neck rigidity.      Thyroid: No thyromegaly.      Vascular: No carotid bruit or JVD.   Cardiovascular:       Rate and Rhythm: Normal rate and regular rhythm.      Pulses: Normal pulses.      Heart sounds: Normal heart sounds. No murmur. No friction rub. No gallop.    Pulmonary:      Effort: Pulmonary effort is normal. No respiratory distress.      Breath sounds: Normal breath sounds. No stridor. No wheezing, rhonchi or rales.   Chest:      Chest wall: No tenderness.   Abdominal:      General: Abdomen is flat. Bowel sounds are normal. There is no distension.      Palpations: Abdomen is soft. There is no mass.      Tenderness: There is no tenderness. There is no guarding or rebound.      Hernia: No hernia is present.   Musculoskeletal: Normal range of motion.         General: Tenderness and signs of injury present. No swelling.      Comments: Left shoulder pain full range of motion   Lymphadenopathy:      Cervical: No cervical adenopathy.   Skin:     General: Skin is warm and dry.      Capillary Refill: Capillary refill takes more than 3 seconds.      Coloration: Skin is not jaundiced or pale.   Neurological:      General: No focal deficit present.      Mental Status: She is alert and oriented to person, place, and time. Mental status is at baseline.      Cranial Nerves: No cranial nerve deficit.   Psychiatric:         Mood and Affect: Mood normal.         Behavior: Behavior normal.         Fluids    Intake/Output Summary (Last 24 hours) at 12/1/2019 1250  Last data filed at 12/1/2019 0802  Gross per 24 hour   Intake 600 ml   Output 300 ml   Net 300 ml       Laboratory  Recent Labs     11/29/19  1119 11/30/19  0315   WBC 7.3 6.8   RBC 4.68 4.61   HEMOGLOBIN 14.0 13.9   HEMATOCRIT 42.2 41.4   MCV 90.2 89.8   MCH 29.9 30.2   MCHC 33.2* 33.6   RDW 48.3 48.6   PLATELETCT 156* 152*   MPV 11.0 11.0     Recent Labs     11/29/19  1119 11/30/19  0315   SODIUM 137 141   POTASSIUM 3.8 3.9   CHLORIDE 104 107   CO2 21 23   GLUCOSE 86 106*   BUN 18 15   CREATININE 0.95 0.95   CALCIUM 9.1 8.9     Recent Labs     11/29/19  1119   APTT 28.8    INR 0.98         Recent Labs     11/30/19  0315   TRIGLYCERIDE 63      LDL 88       Imaging  EC-ECHOCARDIOGRAM COMPLETE W/O CONT   Final Result      CT-HEAD W/O   Final Result      1.  No significant change in left convexity subacute subdural hematoma measuring maximum of 9 mm thickness      2.  Minimal mass effect with 2-3 mm left-to-right shift      3.  Underlying cerebral volume loss and white matter change      MR-BRAIN-W/O   Final Result   Addendum 1 of 1   Addendum:   Findings are discussed with the neurosurgeon Dr. Romie Cabrera. She is a    candidate for middle meningeal artery embolization to prevent recurrence.    Please consult interventional radiology.      Final      DX-SHOULDER 2+ LEFT   Final Result      Unremarkable shoulder series.                  INTERPRETING LOCATION:  [address] [place] [address], [facility], [address]      DX-CHEST-PORTABLE (1 VIEW)   Final Result      No acute cardiac or pulmonary abnormalities are identified.      CT-CTA HEAD WITH & W/O-POST PROCESS   Final Result      CT angiogram of the [place] within normal limits.      CT-CTA NECK WITH & W/O-POST PROCESSING   Final Result      Mild atherosclerotic disease without evidence of significant stenosis or occlusion.      CT-CEREBRAL PERFUSION ANALYSIS   Final Result      1.  Cerebral blood flow less than 30% likely representing completed infarct = 0 mL.      2.  T Max more than 6 seconds likely representing combination of completed infarct and ischemia = 0 mL.      3.  Mismatched volume likely representing ischemic brain/penumbra = None      4.  Please note that the cerebral perfusion was performed on the limited brain tissue around the basal ganglia region. Infarct/ischemia outside the CT perfusion sections can be missed in this study.      CT-HEAD W/O   Final Result      Left parietal subdural hematoma appears acute-subacute and there is a 2 mm rightward midline shift      Age-appropriate cerebral volume loss.      Moderate white  matter hypodensity is present.  This is a nonspecific finding which usually is found to represent chronic microvascular disease in patient's of this demographic.  Demyelination, age indeterminant ischemia and gliosis are also common    possibilities.      Chronic right caudate head lacunar infarction      I discussed the findings with the patient's neurologist before this interpretation was generated      CT-HEAD W/O    (Results Pending)        Assessment/Plan  * Subdural hematoma (HCC)  Assessment & Plan  Likely related to her fall in October  Patient is of being evaluated by neurosurgery as well as neurology  On [facility] per neurosurgery recommendations   plans for meningeal artery embolization on Monday  NPO after midnight    Complains of new onset of right-sided hand numbness.  Ordered a stat CT.  Otherwise neurologically unchanged compared to previously.    Murmur, cardiac- (present on admission)  Assessment & Plan  Check echocardiogram  Echo normal EF.    Acquired hypothyroidism  Assessment & Plan  Continue replacement therapy    TIA (transient ischemic attack)  Assessment & Plan  Right-sided weakness could be secondary to TIA versus possible seizure related to subdural hematoma  She was evaluated by neurology  Dr Encarnacion recommended every hour neurochecks with a stat CT if any changes in neurologic status as well as MRI of brain  We will hold off on aspirin given subdural hematoma  We will start her on atorvastatin check lipid panel given old lacunar infarcts noted on CT    Left shoulder pain  Assessment & Plan  X-ray reviewed negative for fracture  The patient may need to follow-up as an outpatient with [facility] to further evaluate     I have seen and examined patient on 12/1/2019. I have reviewed vitals, new labs and imaging. I have discussed POC with RN. There are no changes from (11/30/2019) except for what is mentioned above.     VTE prophylaxis: SCD      Current Facility-Administered Medications:   •  NS  infusion, , Intravenous, Continuous, Blanca Plummer M.D.  •  labetalol (NORMODYNE/TRANDATE) injection 10-20 mg, 10-20 mg, Intravenous, Q4HRS PRN, Errol Castaneda M.D.  •  levothyroxine (SYNTHROID) tablet 100 mcg, 100 mcg, Oral, AM ES, Colin Ortez M.D., 100 mcg at 12/01/19 0404  •  senna-docusate (PERICOLACE or SENOKOT S) 8.6-50 MG per tablet 2 Tab, 2 Tab, Oral, BID, Stopped at 11/30/19 1800 **AND** polyethylene glycol/lytes (MIRALAX) PACKET 1 Packet, 1 Packet, Oral, QDAY PRN **AND** magnesium hydroxide (MILK OF MAGNESIA) suspension 30 mL, 30 mL, Oral, QDAY PRN **AND** bisacodyl (DULCOLAX) suppository 10 mg, 10 mg, Rectal, QDAY PRN, Colin Ortez M.D.  •  acetaminophen (TYLENOL) tablet 650 mg, 650 mg, Oral, Q6HRS PRN, Colin Ortez M.D., 650 mg at 11/30/19 2307  •  ondansetron (ZOFRAN) syringe/vial injection 4 mg, 4 mg, Intravenous, Q4HRS PRN, Colin Ortez M.D.  •  ondansetron (ZOFRAN ODT) dispertab 4 mg, 4 mg, Oral, Q4HRS PRN, Colin Ortez M.D.  •  atorvastatin (LIPITOR) tablet 40 mg, 40 mg, Oral, Q EVENING, Colin Ortez M.D., 40 mg at 11/30/19 1740  •  levETIRAcetam (KEPPRA) tablet 500 mg, 500 mg, Oral, BID, Colin Ortez M.D., 500 mg at 12/01/19 0404

## 2019-12-01 NOTE — THERAPY
Occupational Therapy Contact Note:    OT orders received, pt scheduled for embolization on Monday 12/2, will hold until post op.      Urszula Hamilton,  Pager: [phone]

## 2019-12-01 NOTE — PROGRESS NOTES
Pt transported to [facility] via WC with CNA. All personal belongs with patient

## 2019-12-01 NOTE — PROGRESS NOTES
Pt arrived to unit from ICU, received report from Alisha BENITEZ, no questions. Pt denies pain, though has decreased ROM to L shoulder from fall prior to hospitalization. Blanchable redness noted to coccyx. 2 RN skin check completed. No other questions or concerns expressed. Call light in reach, bed alarm in place.

## 2019-12-01 NOTE — CARE PLAN
Problem: Communication  Goal: The ability to communicate needs accurately and effectively will improve  Outcome: PROGRESSING AS EXPECTED  Intervention: Educate patient and significant other/support system about the plan of care, procedures, treatments, medications and allow for questions  Flowsheets (Taken 12/1/2019 1342)  Pt & Family Have Been Educated on Methods Available to Report Concerns Related to Care, Treatment, Services, and Patient Safety Issues: Yes  Note:   Plan of care discussed with patient. All questions answered. Plan for today shower, CT scan, NPO at midnight.        Problem: Mobility  Goal: Risk for activity intolerance will decrease  Outcome: PROGRESSING AS EXPECTED  Intervention: Encourage patient to increase activity level in collaboration with Interdisciplinary Team  Note:   PT/OT ordered. Eval will be after procedure tomorrow. Pt ambulating with SB assist and no equipment. Gait steady. Pt calls appropriately and doesn't attempt to get out of bed without staff assistance. Low fall risk and pt reefusing bed alarm.

## 2019-12-01 NOTE — PROGRESS NOTES
Received report from Day RN, all questions answered, call light within reach, bed locked and in lowest position, bed alarm on. Hourly rounding in place.

## 2019-12-01 NOTE — PROGRESS NOTES
1143 Page sent to Dr. Plummer. Pt c/o of new numbness to right hand. Pt states it started 10 min ago but it is improving. Neuro exam is negative except for some limb ataxia in right upper extremities when finger to nose.     1220 2nd page sent to Dr Davila. Pt's numbness to right hand subsided and no further limb ataxia. Numbness lasted approximately 45min.     1222 Dr. Plummer to order stat CT.

## 2019-12-02 ENCOUNTER — ANESTHESIA EVENT (OUTPATIENT)
Dept: RADIOLOGY | Facility: MEDICAL CENTER | Age: 84
DRG: 026 | End: 2019-12-02
Payer: MEDICARE

## 2019-12-02 ENCOUNTER — APPOINTMENT (OUTPATIENT)
Dept: RADIOLOGY | Facility: MEDICAL CENTER | Age: 84
DRG: 026 | End: 2019-12-02
Attending: RADIOLOGY
Payer: MEDICARE

## 2019-12-02 ENCOUNTER — ANESTHESIA (OUTPATIENT)
Dept: RADIOLOGY | Facility: MEDICAL CENTER | Age: 84
DRG: 026 | End: 2019-12-02
Payer: MEDICARE

## 2019-12-02 PROCEDURE — 160002 HCHG RECOVERY MINUTES (STAT)

## 2019-12-02 PROCEDURE — 700102 HCHG RX REV CODE 250 W/ 637 OVERRIDE(OP): Performed by: HOSPITALIST

## 2019-12-02 PROCEDURE — 61624 TCAT PERM OCCLS/EMBOLJ CNS: CPT

## 2019-12-02 PROCEDURE — B41F1ZZ FLUOROSCOPY OF RIGHT LOWER EXTREMITY ARTERIES USING LOW OSMOLAR CONTRAST: ICD-10-PCS | Performed by: RADIOLOGY

## 2019-12-02 PROCEDURE — 770020 HCHG ROOM/CARE - TELE (206)

## 2019-12-02 PROCEDURE — B31R1ZZ FLUOROSCOPY OF INTRACRANIAL ARTERIES USING LOW OSMOLAR CONTRAST: ICD-10-PCS | Performed by: RADIOLOGY

## 2019-12-02 PROCEDURE — 03LG3DZ OCCLUSION OF INTRACRANIAL ARTERY WITH INTRALUMINAL DEVICE, PERCUTANEOUS APPROACH: ICD-10-PCS | Performed by: RADIOLOGY

## 2019-12-02 PROCEDURE — 700105 HCHG RX REV CODE 258: Performed by: ANESTHESIOLOGY

## 2019-12-02 PROCEDURE — A9270 NON-COVERED ITEM OR SERVICE: HCPCS | Performed by: HOSPITALIST

## 2019-12-02 PROCEDURE — 700111 HCHG RX REV CODE 636 W/ 250 OVERRIDE (IP): Performed by: ANESTHESIOLOGY

## 2019-12-02 PROCEDURE — 99232 SBSQ HOSP IP/OBS MODERATE 35: CPT | Performed by: INTERNAL MEDICINE

## 2019-12-02 PROCEDURE — 700101 HCHG RX REV CODE 250: Performed by: ANESTHESIOLOGY

## 2019-12-02 RX ORDER — SODIUM CHLORIDE, SODIUM LACTATE, POTASSIUM CHLORIDE, CALCIUM CHLORIDE 600; 310; 30; 20 MG/100ML; MG/100ML; MG/100ML; MG/100ML
INJECTION, SOLUTION INTRAVENOUS
Status: DISCONTINUED | OUTPATIENT
Start: 2019-12-02 | End: 2019-12-02 | Stop reason: SURG

## 2019-12-02 RX ORDER — NEOSTIGMINE METHYLSULFATE 1 MG/ML
INJECTION, SOLUTION INTRAVENOUS PRN
Status: DISCONTINUED | OUTPATIENT
Start: 2019-12-02 | End: 2019-12-02 | Stop reason: SURG

## 2019-12-02 RX ORDER — ONDANSETRON 2 MG/ML
4 INJECTION INTRAMUSCULAR; INTRAVENOUS
Status: DISCONTINUED | OUTPATIENT
Start: 2019-12-02 | End: 2019-12-02 | Stop reason: HOSPADM

## 2019-12-02 RX ORDER — MIDAZOLAM HYDROCHLORIDE 1 MG/ML
INJECTION INTRAMUSCULAR; INTRAVENOUS
Status: DISPENSED
Start: 2019-12-02 | End: 2019-12-02

## 2019-12-02 RX ORDER — MEPERIDINE HYDROCHLORIDE 25 MG/ML
6.25 INJECTION INTRAMUSCULAR; INTRAVENOUS; SUBCUTANEOUS
Status: DISCONTINUED | OUTPATIENT
Start: 2019-12-02 | End: 2019-12-02 | Stop reason: HOSPADM

## 2019-12-02 RX ORDER — LIDOCAINE HYDROCHLORIDE 40 MG/ML
SOLUTION TOPICAL
Status: DISPENSED
Start: 2019-12-02 | End: 2019-12-02

## 2019-12-02 RX ORDER — ONDANSETRON 2 MG/ML
INJECTION INTRAMUSCULAR; INTRAVENOUS PRN
Status: DISCONTINUED | OUTPATIENT
Start: 2019-12-02 | End: 2019-12-02 | Stop reason: SURG

## 2019-12-02 RX ORDER — LABETALOL HYDROCHLORIDE 5 MG/ML
5 INJECTION, SOLUTION INTRAVENOUS
Status: DISCONTINUED | OUTPATIENT
Start: 2019-12-02 | End: 2019-12-02 | Stop reason: HOSPADM

## 2019-12-02 RX ORDER — HEPARIN SODIUM,PORCINE 1000/ML
VIAL (ML) INJECTION PRN
Status: DISCONTINUED | OUTPATIENT
Start: 2019-12-02 | End: 2019-12-02 | Stop reason: SURG

## 2019-12-02 RX ORDER — CEFAZOLIN SODIUM 1 G/3ML
INJECTION, POWDER, FOR SOLUTION INTRAMUSCULAR; INTRAVENOUS PRN
Status: DISCONTINUED | OUTPATIENT
Start: 2019-12-02 | End: 2019-12-02 | Stop reason: SURG

## 2019-12-02 RX ORDER — HEPARIN SODIUM,PORCINE 1000/ML
VIAL (ML) INJECTION
Status: COMPLETED
Start: 2019-12-02 | End: 2019-12-02

## 2019-12-02 RX ORDER — SODIUM CHLORIDE, SODIUM LACTATE, POTASSIUM CHLORIDE, CALCIUM CHLORIDE 600; 310; 30; 20 MG/100ML; MG/100ML; MG/100ML; MG/100ML
INJECTION, SOLUTION INTRAVENOUS CONTINUOUS
Status: DISCONTINUED | OUTPATIENT
Start: 2019-12-02 | End: 2019-12-02 | Stop reason: HOSPADM

## 2019-12-02 RX ORDER — ESMOLOL HYDROCHLORIDE 10 MG/ML
INJECTION INTRAVENOUS PRN
Status: DISCONTINUED | OUTPATIENT
Start: 2019-12-02 | End: 2019-12-02 | Stop reason: SURG

## 2019-12-02 RX ORDER — PROTAMINE SULFATE 10 MG/ML
INJECTION, SOLUTION INTRAVENOUS
Status: COMPLETED
Start: 2019-12-02 | End: 2019-12-02

## 2019-12-02 RX ADMIN — EPHEDRINE SULFATE 5 MG: 50 INJECTION INTRAMUSCULAR; INTRAVENOUS; SUBCUTANEOUS at 11:04

## 2019-12-02 RX ADMIN — LEVETIRACETAM 500 MG: 500 TABLET ORAL at 04:08

## 2019-12-02 RX ADMIN — SODIUM CHLORIDE, POTASSIUM CHLORIDE, SODIUM LACTATE AND CALCIUM CHLORIDE: 600; 310; 30; 20 INJECTION, SOLUTION INTRAVENOUS at 10:05

## 2019-12-02 RX ADMIN — LEVOTHYROXINE SODIUM 100 MCG: 25 TABLET ORAL at 04:08

## 2019-12-02 RX ADMIN — PROPOFOL 50 MG: 10 INJECTION, EMULSION INTRAVENOUS at 10:30

## 2019-12-02 RX ADMIN — HEPARIN SODIUM 5000 UNITS: 1000 INJECTION, SOLUTION INTRAVENOUS; SUBCUTANEOUS at 11:22

## 2019-12-02 RX ADMIN — GLYCOPYRROLATE 0.5 MG: 0.2 INJECTION INTRAMUSCULAR; INTRAVENOUS at 11:46

## 2019-12-02 RX ADMIN — ESMOLOL HYDROCHLORIDE 20 MG: 100 INJECTION, SOLUTION INTRAVENOUS at 11:15

## 2019-12-02 RX ADMIN — NEOSTIGMINE METHYLSULFATE 2.5 MG: 1 INJECTION INTRAVENOUS at 11:46

## 2019-12-02 RX ADMIN — SENNOSIDES AND DOCUSATE SODIUM 2 TABLET: 8.6; 5 TABLET ORAL at 16:58

## 2019-12-02 RX ADMIN — ONDANSETRON 4 MG: 2 INJECTION INTRAMUSCULAR; INTRAVENOUS at 10:10

## 2019-12-02 RX ADMIN — FENTANYL CITRATE 50 MCG: 50 INJECTION, SOLUTION INTRAMUSCULAR; INTRAVENOUS at 10:10

## 2019-12-02 RX ADMIN — ESMOLOL HYDROCHLORIDE 20 MG: 100 INJECTION, SOLUTION INTRAVENOUS at 10:30

## 2019-12-02 RX ADMIN — PROPOFOL 30 MG: 10 INJECTION, EMULSION INTRAVENOUS at 11:14

## 2019-12-02 RX ADMIN — ROCURONIUM BROMIDE 10 MG: 10 INJECTION, SOLUTION INTRAVENOUS at 11:14

## 2019-12-02 RX ADMIN — CEFAZOLIN 2 G: 330 INJECTION, POWDER, FOR SOLUTION INTRAMUSCULAR; INTRAVENOUS at 10:08

## 2019-12-02 RX ADMIN — FENTANYL CITRATE 50 MCG: 50 INJECTION, SOLUTION INTRAMUSCULAR; INTRAVENOUS at 10:30

## 2019-12-02 RX ADMIN — PROPOFOL 30 MG: 10 INJECTION, EMULSION INTRAVENOUS at 10:57

## 2019-12-02 RX ADMIN — EPHEDRINE SULFATE 5 MG: 50 INJECTION INTRAMUSCULAR; INTRAVENOUS; SUBCUTANEOUS at 11:29

## 2019-12-02 RX ADMIN — SODIUM CHLORIDE, POTASSIUM CHLORIDE, SODIUM LACTATE AND CALCIUM CHLORIDE: 600; 310; 30; 20 INJECTION, SOLUTION INTRAVENOUS at 11:31

## 2019-12-02 RX ADMIN — LEVETIRACETAM 500 MG: 500 TABLET ORAL at 16:58

## 2019-12-02 RX ADMIN — ATORVASTATIN CALCIUM 40 MG: 40 TABLET, FILM COATED ORAL at 16:58

## 2019-12-02 RX ADMIN — PROPOFOL 20 MG: 10 INJECTION, EMULSION INTRAVENOUS at 11:10

## 2019-12-02 RX ADMIN — ACETAMINOPHEN 650 MG: 325 TABLET, FILM COATED ORAL at 22:59

## 2019-12-02 RX ADMIN — FENTANYL CITRATE 25 MCG: 50 INJECTION, SOLUTION INTRAMUSCULAR; INTRAVENOUS at 11:14

## 2019-12-02 RX ADMIN — ROCURONIUM BROMIDE 20 MG: 10 INJECTION, SOLUTION INTRAVENOUS at 10:30

## 2019-12-02 ASSESSMENT — ENCOUNTER SYMPTOMS
INSOMNIA: 0
SPUTUM PRODUCTION: 0
BACK PAIN: 0
HALLUCINATIONS: 0
SENSORY CHANGE: 1
CONSTIPATION: 0
BLURRED VISION: 0
DEPRESSION: 0
RESPIRATORY NEGATIVE: 1
NERVOUS/ANXIOUS: 0
HEARTBURN: 0
FEVER: 0
TREMORS: 0
EYE PAIN: 0
FOCAL WEAKNESS: 0
TINGLING: 1
GASTROINTESTINAL NEGATIVE: 1
DIZZINESS: 0
ABDOMINAL PAIN: 0
CARDIOVASCULAR NEGATIVE: 1
COUGH: 0
WEAKNESS: 0
ORTHOPNEA: 0
SPEECH CHANGE: 0
HEADACHES: 0
POLYDIPSIA: 0
WEIGHT LOSS: 0
EYES NEGATIVE: 1
PSYCHIATRIC NEGATIVE: 1
FALLS: 0
PND: 0
SEIZURES: 0
WHEEZING: 0
PALPITATIONS: 0
SHORTNESS OF BREATH: 0
BRUISES/BLEEDS EASILY: 0

## 2019-12-02 ASSESSMENT — LIFESTYLE VARIABLES: SUBSTANCE_ABUSE: 0

## 2019-12-02 ASSESSMENT — PAIN SCALES - GENERAL: PAIN_LEVEL: 0

## 2019-12-02 NOTE — PROGRESS NOTES
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 Procedure BARs explained to patient by physicians and consents obtained. General anesthesia administered by Dr Nichols. Patient to IR3 and assisted to table. Patient was monitored and assessed continuously throughout procedure. Left MMA aleksander embolization completed without complication, however left wrist swelling, pain, and tingling fingers noted at site of previous art line attempt (per anesthesiologist). Right groin puncture closed with angioseal and site CDI; sterile guaze/dressing applied. Patient tolerated procedure quite well and was appropriately responsive afterward. Patient transferred to [facility] PACU in good condition. Handoff to Terrie BENITEZ. Family not present.  [facility] Angioseal VIP #[id number] ref [id number] lot [id number]

## 2019-12-02 NOTE — PROGRESS NOTES
RN MOBILITY NOTE     Surgery patient?: no  Date of surgery: n/a  Ambulated 50 ft on day of surgery? (N/A if today is not date of surgery): n/a  Number of times ambulated 50 feet or greater today: 3  Patient has been up to chair, edge of bed or HOB 90 degrees for all meals?: yes  Goal met? (goal is ambulating at least 50 feet 2 times on day shift, one time on night shift): yes  If patient did not meet mobility goal, why?: n/A

## 2019-12-02 NOTE — THERAPY
SPEECH THERAPY CONTACT NOTE:     Orders received for cognitive evaluation. Pt s/p recent embolization today. SLP will hold eval and re-attempt tomorrow as able and appropriate. Thank you.

## 2019-12-02 NOTE — ANESTHESIA QCDR
2019 [facility] Clinical Data Registry (for Quality Improvement)     Postoperative nausea/vomiting risk protocol (Adult = 18 yrs and Pediatric 3-17 yrs)- (430 and 463)  General inhalation anesthetic (NOT TIVA) with PONV risk factors: No  Provision of anti-emetic therapy with at least 2 different classes of agents: N/A  Patient DID NOT receive anti-emetic therapy and reason is documented in Medical Record: N/A    Multimodal Pain Management- (AQI59)  Patient undergoing Elective Surgery (i.e. Outpatient, or ASC, or Prescheduled Surgery prior to Hospital Admission): No  Use of Multimodal Pain Management, two or more drugs and/or interventions, NOT including systemic opioids: N/A  Exception: Documented allergy to multiple classes of analgesics: N/A    PACU assessment of acute postoperative pain prior to Anesthesia Care End- Applies to Patients Age = 18- (ABG7)  Initial PACU pain score is which of the following: < 7/10  Patient unable to report pain score: N/A    Post-anesthetic transfer of care checklist/protocol to PACU/ICU- (426 and 427)  Upon conclusion of case, patient transferred to which of the following locations: PACU/Non-ICU  Use of transfer checklist/protocol: Yes  Exclusion: Service Performed in Patient Hospital Room (and thus did not require transfer): N/A    PACU Reintubation- (AQI31)  General anesthesia requiring endotracheal intubation (ETT) along with subsequent extubation in OR or PACU: Yes  Required reintubation in the PACU: No   Extubation was a planned trial documented in the medical record prior to removal of the original airway device:  N/A    Unplanned admission to ICU related to anesthesia service up through end of PACU care- (MD51)  Unplanned admission to ICU (not initially anticipated at anesthesia start time): No

## 2019-12-02 NOTE — ANESTHESIA POSTPROCEDURE EVALUATION
Patient: Michelle Mckeon    Procedure Summary     Date:  12/02/19 Room / Location:  [facility] IMAGING - INTERVENTIONAL - [facility]    Anesthesia Start:  1005 Anesthesia Stop:  1201    Procedure:  IR-EMBOLIZE-NEURO-INTRACRANIAL Diagnosis:                               (per Dr. Devlin)    Scheduled Providers:  Marcial Nichols Jr., M.D. Responsible Provider:  Marcial Nichols Jr., M.D.    Anesthesia Type:  general ASA Status:  3          Final Anesthesia Type: general  Last vitals  BP   Blood Pressure : 146/64    Temp   36.2 °C (97.2 °F)    Pulse   Pulse: 72   Resp   16    SpO2   93 %      Anesthesia Post Evaluation    Patient location during evaluation: PACU  Patient participation: complete - patient participated  Level of consciousness: awake and alert  Pain score: 0    Airway patency: patent  Anesthetic complications: no  Cardiovascular status: hemodynamically stable  Respiratory status: acceptable and nasal cannula  Hydration status: euvolemic    PONV: none           Nurse Pain Score: 0 (NPRS)

## 2019-12-02 NOTE — PROGRESS NOTES
Neurosurgery Progress Note    Subjective:  Pt not in room    Exam:  NA    BP  Min: 115/60  Max: 146/64  Pulse  Av.5  Min: 65  Max: 84  Resp  Av.2  Min: 16  Max: 17  Temp  Av.3 °C (97.4 °F)  Min: 36.1 °C (97 °F)  Max: 36.7 °C (98.1 °F)  SpO2  Av.2 %  Min: 92 %  Max: 95 %    No data recorded    Recent Labs     19  1119 19  0315   WBC 7.3 6.8   RBC 4.68 4.61   HEMOGLOBIN 14.0 13.9   HEMATOCRIT 42.2 41.4   MCV 90.2 89.8   MCH 29.9 30.2   MCHC 33.2* 33.6   RDW 48.3 48.6   PLATELETCT 156* 152*   MPV 11.0 11.0     Recent Labs     19  1119 19  0315   SODIUM 137 141   POTASSIUM 3.8 3.9   CHLORIDE 104 107   CO2 21 23   GLUCOSE 86 106*   BUN 18 15   CREATININE 0.95 0.95   CALCIUM 9.1 8.9     Recent Labs     19  111   APTT 28.8   INR 0.98           Intake/Output       19 0700 - 19 0659 19 0700 - 19 0659      3728-2802 7815-1319 Total 1216-8375 1164-7731 Total       Intake    P.O.  820  360 1180  --  -- --    P.O.  -- -- --    Total Intake  -- -- --       Output    Urine  --  -- --  --  -- --    Number of Times Voided 2 x 1 x 3 x 1 x -- 1 x    Stool  --  -- --  --  -- --    Number of Times Stooled 1 x -- 1 x -- -- --    Total Output -- -- -- -- -- --       Net I/O      -- -- --            Intake/Output Summary (Last 24 hours) at 2019 1016  Last data filed at 2019 1900  Gross per 24 hour   Intake 820 ml   Output --   Net 820 ml            • midazolam      • lidocaine 4%      • NS   Continuous   • labetalol  10-20 mg Q4HRS PRN   • levothyroxine  100 mcg AM ES   • senna-docusate  2 Tab BID    And   • polyethylene glycol/lytes  1 Packet QDAY PRN    And   • magnesium hydroxide  30 mL QDAY PRN    And   • bisacodyl  10 mg QDAY PRN   • acetaminophen  650 mg Q6HRS PRN   • ondansetron  4 mg Q4HRS PRN   • ondansetron  4 mg Q4HRS PRN   • atorvastatin  40 mg Q EVENING   • levETIRAcetam  500 mg BID     CT-HEAD W/O   Final  Result      1.  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  Underlying cerebral volume loss and white matter change      EC-ECHOCARDIOGRAM COMPLETE W/O CONT   Final Result      CT-HEAD W/O   Final Result      1.  No significant change in left convexity subacute subdural hematoma measuring maximum of 9 mm thickness      2.  Minimal mass effect with 2-3 mm left-to-right shift      3.  Underlying cerebral volume loss and white matter change      MR-BRAIN-W/O   Final Result   Addendum 1 of 1   Addendum:   Findings are discussed with the neurosurgeon Dr. Romie Cabrera. She is a    candidate for middle meningeal artery embolization to prevent recurrence.    Please consult interventional radiology.      Final      DX-SHOULDER 2+ LEFT   Final Result      Unremarkable shoulder series.                  INTERPRETING LOCATION:  [address] [facility], [facility], [address]      DX-CHEST-PORTABLE (1 VIEW)   Final Result      No acute cardiac or pulmonary abnormalities are identified.      CT-CTA HEAD WITH & W/O-POST PROCESS   Final Result      CT angiogram of the [place] within normal limits.      CT-CTA NECK WITH & W/O-POST PROCESSING   Final Result      Mild atherosclerotic disease without evidence of significant stenosis or occlusion.      CT-CEREBRAL PERFUSION ANALYSIS   Final Result      1.  Cerebral blood flow less than 30% likely representing completed infarct = 0 mL.      2.  T Max more than 6 seconds likely representing combination of completed infarct and ischemia = 0 mL.      3.  Mismatched volume likely representing ischemic brain/penumbra = None      4.  Please note that the cerebral perfusion was performed on the limited brain tissue around the basal ganglia region. Infarct/ischemia outside the CT perfusion sections can be missed in this study.      CT-HEAD W/O   Final Result      Left parietal subdural hematoma appears acute-subacute and there is a 2 mm rightward midline shift      Age-appropriate  cerebral volume loss.      Moderate white matter hypodensity is present.  This is a nonspecific finding which usually is found to represent chronic microvascular disease in patient's of this demographic.  Demyelination, age indeterminant ischemia and gliosis are also common    possibilities.      Chronic right caudate head lacunar infarction      I discussed the findings with the patient's neurologist before this interpretation was generated      IR-EMBOLIZE-NEURO-INTRACRANIAL    (Results Pending)         Assessment and Plan:  Hospital day #4  POD# 0  Chemical prophylactic DVT therapy: no  Start date/time: no      Plan:  Embolization today    Ambulate as tolerated  OT/PT/Speech - appreciate discharge dispo recommendations  NSGY team will be by tomorrow

## 2019-12-02 NOTE — PROGRESS NOTES
Assessment completed, pt A&Ox4, declines pain. Updated about POC, questions and concerns were addressed. Pt has no other needs at this time. Bed locked, in lowest position, call light within reach. Pt advised to call for assistance.

## 2019-12-02 NOTE — ANESTHESIA TIME REPORT
Anesthesia Start and Stop Event Times     Date Time Event    12/2/2019 1003 Ready for Procedure     1005 Anesthesia Start     1201 Anesthesia Stop        Responsible Staff  12/02/19    Name Role Begin End    Marcial Nichols Jr., M.D. Anesth 1005 1201        Preop Diagnosis (Free Text):  Pre-op Diagnosis             Preop Diagnosis (Codes):    Post op Diagnosis  Subdural hematoma (HCC)      Premium Reason  Non-Premium    Comments:

## 2019-12-02 NOTE — CARE PLAN
Problem: Communication  Goal: The ability to communicate needs accurately and effectively will improve  Outcome: PROGRESSING AS EXPECTED  Intervention: Educate patient and significant other/support system about the plan of care, procedures, treatments, medications and allow for questions  Note:   Encourage patient and family to voice concerns and/or questions about plan of care, medications, procedures.      Problem: Venous Thromboembolism (VTW)/Deep Vein Thrombosis (DVT) Prevention:  Goal: Patient will participate in Venous Thrombosis (VTE)/Deep Vein Thrombosis (DVT)Prevention Measures  Outcome: PROGRESSING AS EXPECTED  Intervention: Ensure patient wears graduated elastic stockings (YULIYA hose) and/or SCDs, if ordered, when in bed or chair (Remove at least once per shift for skin check)  Note:   SCD's in use for DVT prophylaxis

## 2019-12-02 NOTE — PROGRESS NOTES
Received report from Day RN, all questions answered, call light within reach, bed locked and in lowest position. Hourly rounding in place.

## 2019-12-02 NOTE — OR NURSING
1320-right groin site remains soft, clean and dry. Unable to get right pedal pulse but post tib pulse present, foot warm with adequate capillary refill. Left wrist bruised from arterial line attempts, pt states mild numbness/tingling at site

## 2019-12-02 NOTE — OR SURGEON
Immediate Post- Operative Note        PostOp Diagnosis: L SDH    Procedure(s): left MMA embolization      Estimated Blood Loss: Less than 5 ml        Complications: None            12/2/2019     11:50 AM     Kuldeep Devlin

## 2019-12-02 NOTE — ANESTHESIA PROCEDURE NOTES
Airway  Date/Time: 12/2/2019 10:32 AM  Performed by: Marcial Nichols Jr., M.D.  Authorized by: Marcial Nichols Jr., M.D.     Location:  OR  Urgency:  Elective  Indications for Airway Management:  Anesthesia  Spontaneous Ventilation: absent    Sedation Level:  Deep  Preoxygenated: Yes    Patient Position:  Sniffing  Final Airway Type:  Endotracheal airway  Final Endotracheal Airway:  ETT  Cuffed: Yes    Technique Used for Successful ETT Placement:  Direct laryngoscopy  Insertion Site:  Oral  Blade Type:  Harris  Laryngoscope Blade/Videolaryngoscope Blade Size:  3  ETT Size (mm):  7.0  Measured from:  Teeth  ETT to Teeth (cm):  22  Placement Verified by: auscultation and capnometry    Cormack-Lehane Classification:  Grade IIa - partial view of glottis  Number of Attempts at Approach:  1

## 2019-12-02 NOTE — DISCHARGE PLANNING
Would welcome TX evals s/p  meningeal artery embolization that is planned for today once appropriate.

## 2019-12-02 NOTE — PROGRESS NOTES
[facility] Medicine Daily Progress Note    Date of Service  12/2/2019    Chief Complaint  84 y.o. female admitted 11/29/2019 with right-sided weakness and numbness.  The patient was admitted on 11/29, around 10:30 AM she noted right-sided weakness and associated numbness and tingling in the right facial structure.  This lasted for approximately 2 hours, the patient presented to the emergency room and her symptoms slowly subsided, several weeks ago she stated she fell out of bed and might of had a head injury as well as hurting her left shoulder which she has had difficulties in the past.  The patient denies fevers or chills, she denies headache.  The patient was found with a small amount of subdural hematoma as well as subarachnoid hemorrhage, the subdural is approximately 7 mm thick in diameter, there is a 3 to 4 mm midline shift from the left to the right.  The patient referred to neurosurgery as well as neurology and [facility]    [facility]    Admitted to ICU with a subdural hematoma, subarachnoid hemorrhage following a fall and head injury      Interval Problem Update  12/1.  Patient has been stable overnight on the neuro floor.  No significant overnight acute event.  Patient did complain new onset of right hand numbness.  We ordered a stat CT.  Patient complains of general body achiness. Patient's pain is general 2-3/10, intermittent and does not radiate to other location, sharp and with some tingling.    12/2.  Patient has been stable overnight and no acute overnight event.  Tolerated embolization procedure today.  Awaiting for repeat PT OT evaluation.  Close monitor. Patient otherwise denies fever, chills, nausea, vomiting, adb pain, SOB, CP, headache, constipation, diarrhea, cough, or sputum.      Consultants/Specialty  Pulmonary critical care  Neurosurgery  Neurology    Code Status  Full code    Disposition  TBD    Review of Systems  Review of Systems   Constitutional:  Positive for malaise/fatigue. Negative for fever and weight loss.   HENT: Negative.  Negative for congestion, ear pain and hearing loss.    Eyes: Negative.  Negative for blurred vision and pain.   Respiratory: Negative.  Negative for cough, sputum production, shortness of breath and wheezing.    Cardiovascular: Negative.  Negative for chest pain, palpitations, orthopnea, leg swelling and PND.   Gastrointestinal: Negative.  Negative for abdominal pain, constipation and heartburn.   Genitourinary: Negative.  Negative for dysuria, frequency and hematuria.   Musculoskeletal: Positive for joint pain. Negative for back pain and falls.   Skin: Negative.  Negative for itching and rash.   Neurological: Positive for tingling and sensory change. Negative for dizziness, tremors, speech change, focal weakness, seizures, weakness and headaches.   Endo/Heme/Allergies: Negative.  Negative for polydipsia. Does not bruise/bleed easily.   Psychiatric/Behavioral: Negative.  Negative for depression, hallucinations, substance abuse and suicidal ideas. The patient is not nervous/anxious and does not have insomnia.         Physical Exam  Temp:  [36.1 °C (97 °F)-36.5 °C (97.7 °F)] 36.5 °C (97.7 °F)  Pulse:  [56-84] 72  Resp:  [16-20] 17  BP: (104-146)/(54-80) 133/54  SpO2:  [92 %-99 %] 98 %    Physical Exam  Vitals signs and nursing note reviewed.   Constitutional:       General: She is not in acute distress.     Appearance: Normal appearance. She is well-developed and normal weight. She is not ill-appearing or diaphoretic.   HENT:      Head: Normocephalic and atraumatic.      Right Ear: Ear canal and external ear normal.      Left Ear: Ear canal and external ear normal.      Nose: Nose normal.      Mouth/Throat:      Mouth: Mucous membranes are moist.      Pharynx: Oropharynx is clear.   Eyes:      Extraocular Movements: Extraocular movements intact.      Conjunctiva/sclera: Conjunctivae normal.      Pupils: Pupils are equal, round, and  reactive to light.   Neck:      Musculoskeletal: Normal range of motion and neck supple. No neck rigidity.      Thyroid: No thyromegaly.      Vascular: No carotid bruit or JVD.   Cardiovascular:      Rate and Rhythm: Normal rate and regular rhythm.      Pulses: Normal pulses.      Heart sounds: Murmur present. No gallop.    Pulmonary:      Effort: Pulmonary effort is normal. No respiratory distress.      Breath sounds: Normal breath sounds. No stridor. No wheezing or rales.   Chest:      Chest wall: No tenderness.   Abdominal:      General: Abdomen is flat. Bowel sounds are normal. There is no distension.      Palpations: Abdomen is soft. There is no mass.      Tenderness: There is no guarding or rebound.      Hernia: No hernia is present.   Musculoskeletal: Normal range of motion.         General: Tenderness and signs of injury present. No swelling.      Comments: Left shoulder pain full range of motion   Lymphadenopathy:      Cervical: No cervical adenopathy.   Skin:     General: Skin is warm and dry.      Capillary Refill: Capillary refill takes more than 3 seconds.      Coloration: Skin is not jaundiced or pale.   Neurological:      General: No focal deficit present.      Mental Status: She is alert and oriented to person, place, and time. Mental status is at baseline.      Cranial Nerves: No cranial nerve deficit.   Psychiatric:         Mood and Affect: Mood normal.         Behavior: Behavior normal.         Fluids    Intake/Output Summary (Last 24 hours) at 12/2/2019 1436  Last data filed at 12/2/2019 1300  Gross per 24 hour   Intake 1710 ml   Output 100 ml   Net 1610 ml       Laboratory  Recent Labs     11/30/19  0315   WBC 6.8   RBC 4.61   HEMOGLOBIN 13.9   HEMATOCRIT 41.4   MCV 89.8   MCH 30.2   MCHC 33.6   RDW 48.6   PLATELETCT 152*   MPV 11.0     Recent Labs     11/30/19  0315   SODIUM 141   POTASSIUM 3.9   CHLORIDE 107   CO2 23   GLUCOSE 106*   BUN 15   CREATININE 0.95   CALCIUM 8.9             Recent  Labs     11/30/19  0315   TRIGLYCERIDE 63      LDL 88       Imaging  CT-HEAD W/O   Final Result      1.  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  Underlying cerebral volume loss and white matter change      EC-ECHOCARDIOGRAM COMPLETE W/O CONT   Final Result      CT-HEAD W/O   Final Result      1.  No significant change in left convexity subacute subdural hematoma measuring maximum of 9 mm thickness      2.  Minimal mass effect with 2-3 mm left-to-right shift      3.  Underlying cerebral volume loss and white matter change      MR-BRAIN-W/O   Final Result   Addendum 1 of 1   Addendum:   Findings are discussed with the neurosurgeon Dr. Romie Cabrera. She is a    candidate for middle meningeal artery embolization to prevent recurrence.    Please consult interventional radiology.      Final      DX-SHOULDER 2+ LEFT   Final Result      Unremarkable shoulder series.                  INTERPRETING LOCATION:  [facility], [place], [address]      DX-CHEST-PORTABLE (1 VIEW)   Final Result      No acute cardiac or pulmonary abnormalities are identified.      CT-CTA HEAD WITH & W/O-POST PROCESS   Final Result      CT angiogram of the [place] of [place] within normal limits.      CT-CTA NECK WITH & W/O-POST PROCESSING   Final Result      Mild atherosclerotic disease without evidence of significant stenosis or occlusion.      CT-CEREBRAL PERFUSION ANALYSIS   Final Result      1.  Cerebral blood flow less than 30% likely representing completed infarct = 0 mL.      2.  T Max more than 6 seconds likely representing combination of completed infarct and ischemia = 0 mL.      3.  Mismatched volume likely representing ischemic brain/penumbra = None      4.  Please note that the cerebral perfusion was performed on the limited brain tissue around the basal ganglia region. Infarct/ischemia outside the CT perfusion sections can be missed in this study.      CT-HEAD W/O   Final Result      Left parietal subdural  hematoma appears acute-subacute and there is a 2 mm rightward midline shift      Age-appropriate cerebral volume loss.      Moderate white matter hypodensity is present.  This is a nonspecific finding which usually is found to represent chronic microvascular disease in patient's of this demographic.  Demyelination, age indeterminant ischemia and gliosis are also common    possibilities.      Chronic right caudate head lacunar infarction      I discussed the findings with the patient's neurologist before this interpretation was generated      IR-EMBOLIZE-NEURO-INTRACRANIAL    (Results Pending)        Assessment/Plan  * Subdural hematoma (HCC)  Assessment & Plan  Likely related to her fall in October  Patient is of being evaluated by neurosurgery as well as neurology  On [facility] per neurosurgery recommendations  underwent meningeal artery embolization today  Tolerated procedure very well.  CT head yesterday did not show changes.  We ordered a PT OT    Murmur, cardiac- (present on admission)  Assessment & Plan  Check echocardiogram  Echo normal EF.    Acquired hypothyroidism  Assessment & Plan  Continue replacement therapy    TIA (transient ischemic attack)  Assessment & Plan  Right-sided weakness could be secondary to TIA versus possible seizure related to subdural hematoma  She was evaluated by neurology  Dr Encarnacion recommended every hour neurochecks with a stat CT if any changes in neurologic status as well as MRI of brain  We will hold off on aspirin given subdural hematoma  We will start her on atorvastatin   Pending further recommendation per neurosurgery    Left shoulder pain  Assessment & Plan  X-ray reviewed negative for fracture  The patient may need to follow-up as an outpatient with [facility] to further evaluate     I have seen and examined patient on 12/2/2019. I have reviewed vitals, new labs and imaging. I have discussed POC with RN. There are no changes from (12/1/2019) except for what is mentioned above.      VTE prophylaxis: SCD      Current Facility-Administered Medications:   •  MIDAZOLAM HCL 2 MG/2ML INJ SOLN, , , ,   •  LIDOCAINE HCL 4 % MT SOLN, , , ,   •  PROTAMINE SULFATE 10 MG/ML IV SOLN, , , ,   •  NS infusion, , Intravenous, Continuous, Blanca Plummer M.D., Stopped at 12/02/19 1005  •  labetalol (NORMODYNE/TRANDATE) injection 10-20 mg, 10-20 mg, Intravenous, Q4HRS PRN, Errol Castaneda M.D.  •  levothyroxine (SYNTHROID) tablet 100 mcg, 100 mcg, Oral, AM ES, Colin Ortez M.D., 100 mcg at 12/02/19 0408  •  senna-docusate (PERICOLACE or SENOKOT S) 8.6-50 MG per tablet 2 Tab, 2 Tab, Oral, BID, Stopped at 11/30/19 1800 **AND** polyethylene glycol/lytes (MIRALAX) PACKET 1 Packet, 1 Packet, Oral, QDAY PRN **AND** magnesium hydroxide (MILK OF MAGNESIA) suspension 30 mL, 30 mL, Oral, QDAY PRN **AND** bisacodyl (DULCOLAX) suppository 10 mg, 10 mg, Rectal, QDAY PRN, Colin Ortez M.D.  •  acetaminophen (TYLENOL) tablet 650 mg, 650 mg, Oral, Q6HRS PRN, Colin Ortez M.D., 650 mg at 11/30/19 2307  •  ondansetron (ZOFRAN) syringe/vial injection 4 mg, 4 mg, Intravenous, Q4HRS PRN, Colin Ortez M.D.  •  ondansetron (ZOFRAN ODT) dispertab 4 mg, 4 mg, Oral, Q4HRS PRN, Colin Ortez M.D.  •  atorvastatin (LIPITOR) tablet 40 mg, 40 mg, Oral, Q EVENING, Colin Ortez M.D., 40 mg at 12/01/19 1625  •  levETIRAcetam (KEPPRA) tablet 500 mg, 500 mg, Oral, BID, Colin Ortez M.D., 500 mg at 12/02/19 5756

## 2019-12-02 NOTE — ANESTHESIA PREPROCEDURE EVALUATION
Relevant Problems   NEURO   (+) TIA (transient ischemic attack)      CARDIAC   (+) Murmur, cardiac      ENDO   (+) Acquired hypothyroidism       Physical Exam    Airway   Mallampati: II  TM distance: >3 FB  Neck ROM: full       Cardiovascular   Rhythm: regular  Rate: normal     Dental   (+) upper dentures, lower dentures         Pulmonary   Breath sounds clear to auscultation     Abdominal    Neurological              Anesthesia Plan    ASA 3 (sdh and hypothyroidism)       Plan - general       Airway plan will be ETT        Induction: intravenous      Pertinent diagnostic labs and testing reviewed    Informed Consent:    Anesthetic plan and risks discussed with patient.

## 2019-12-03 PROCEDURE — 97165 OT EVAL LOW COMPLEX 30 MIN: CPT

## 2019-12-03 PROCEDURE — 770020 HCHG ROOM/CARE - TELE (206)

## 2019-12-03 PROCEDURE — 92523 SPEECH SOUND LANG COMPREHEN: CPT

## 2019-12-03 PROCEDURE — 700102 HCHG RX REV CODE 250 W/ 637 OVERRIDE(OP): Performed by: HOSPITALIST

## 2019-12-03 PROCEDURE — 99232 SBSQ HOSP IP/OBS MODERATE 35: CPT | Performed by: INTERNAL MEDICINE

## 2019-12-03 PROCEDURE — 97161 PT EVAL LOW COMPLEX 20 MIN: CPT

## 2019-12-03 PROCEDURE — A9270 NON-COVERED ITEM OR SERVICE: HCPCS | Performed by: HOSPITALIST

## 2019-12-03 RX ADMIN — ATORVASTATIN CALCIUM 40 MG: 40 TABLET, FILM COATED ORAL at 17:14

## 2019-12-03 RX ADMIN — LEVETIRACETAM 500 MG: 500 TABLET ORAL at 05:24

## 2019-12-03 RX ADMIN — LEVOTHYROXINE SODIUM 100 MCG: 25 TABLET ORAL at 05:24

## 2019-12-03 ASSESSMENT — ENCOUNTER SYMPTOMS
EYES NEGATIVE: 1
GASTROINTESTINAL NEGATIVE: 1
WEAKNESS: 0
SPUTUM PRODUCTION: 0
HALLUCINATIONS: 0
SENSORY CHANGE: 1
TINGLING: 1
HEADACHES: 0
EYE PAIN: 0
INSOMNIA: 0
CONSTIPATION: 0
RESPIRATORY NEGATIVE: 1
BRUISES/BLEEDS EASILY: 0
FALLS: 0
COUGH: 0
PND: 0
SEIZURES: 0
TREMORS: 0
PALPITATIONS: 0
HEARTBURN: 0
FEVER: 0
PSYCHIATRIC NEGATIVE: 1
CARDIOVASCULAR NEGATIVE: 1
WHEEZING: 0
SHORTNESS OF BREATH: 0
POLYDIPSIA: 0
DEPRESSION: 0
DIZZINESS: 0
ORTHOPNEA: 0
NERVOUS/ANXIOUS: 0
ABDOMINAL PAIN: 0
WEIGHT LOSS: 0
BACK PAIN: 0
BLURRED VISION: 0
FOCAL WEAKNESS: 0
SPEECH CHANGE: 0

## 2019-12-03 ASSESSMENT — GAIT ASSESSMENTS
GAIT LEVEL OF ASSIST: SUPERVISED
DISTANCE (FEET): 300
DEVIATION: BRADYKINETIC;DECREASED HEEL STRIKE;DECREASED TOE OFF

## 2019-12-03 ASSESSMENT — COGNITIVE AND FUNCTIONAL STATUS - GENERAL
CLIMB 3 TO 5 STEPS WITH RAILING: A LITTLE
MOBILITY SCORE: 22
SUGGESTED CMS G CODE MODIFIER DAILY ACTIVITY: CH
SUGGESTED CMS G CODE MODIFIER MOBILITY: CJ
DAILY ACTIVITIY SCORE: 24
WALKING IN HOSPITAL ROOM: A LITTLE

## 2019-12-03 ASSESSMENT — ACTIVITIES OF DAILY LIVING (ADL): TOILETING: INDEPENDENT

## 2019-12-03 ASSESSMENT — LIFESTYLE VARIABLES: SUBSTANCE_ABUSE: 0

## 2019-12-03 NOTE — PROGRESS NOTES
[facility] Medicine Daily Progress Note    Date of Service  12/3/2019    Chief Complaint  84 y.o. female admitted 11/29/2019 with right-sided weakness and numbness.  The patient was admitted on 11/29, around 10:30 AM she noted right-sided weakness and associated numbness and tingling in the right facial structure.  This lasted for approximately 2 hours, the patient presented to the emergency room and her symptoms slowly subsided, several weeks ago she stated she fell out of bed and might of had a head injury as well as hurting her left shoulder which she has had difficulties in the past.  The patient denies fevers or chills, she denies headache.  The patient was found with a small amount of subdural hematoma as well as subarachnoid hemorrhage, the subdural is approximately 7 mm thick in diameter, there is a 3 to 4 mm midline shift from the left to the right.  The patient referred to neurosurgery as well as neurology and [facility]    Admitted to ICU with a subdural hematoma, subarachnoid hemorrhage following a fall and head injury      Interval Problem Update  12/1.  Patient has been stable overnight on the neuro floor.  No significant overnight acute event.  Patient did complain new onset of right hand numbness.  We ordered a stat CT.  Patient complains of general body achiness. Patient's pain is general 2-3/10, intermittent and does not radiate to other location, sharp and with some tingling.    12/2.  Patient has been stable overnight and no acute overnight event.  Tolerated embolization procedure today.  Awaiting for repeat PT OT evaluation.  Close monitor. Patient otherwise denies fever, chills, nausea, vomiting, adb pain, SOB, CP, headache, constipation, diarrhea, cough, or sputum.  12/3: Pt seen and examined, afebrile, feeling little dizzy this am after working with therapy, denies any pain. Had embolization done yesterday.     Consultants/Specialty  Pulmonary  critical care  Neurosurgery  Neurology    Code Status  Full code    Disposition  TBD    Review of Systems  Review of Systems   Constitutional: Positive for malaise/fatigue. Negative for fever and weight loss.   HENT: Negative.  Negative for congestion, ear pain and hearing loss.    Eyes: Negative.  Negative for blurred vision and pain.   Respiratory: Negative.  Negative for cough, sputum production, shortness of breath and wheezing.    Cardiovascular: Negative.  Negative for chest pain, palpitations, orthopnea, leg swelling and PND.   Gastrointestinal: Negative.  Negative for abdominal pain, constipation and heartburn.   Genitourinary: Negative.  Negative for dysuria, frequency and hematuria.   Musculoskeletal: Positive for joint pain. Negative for back pain and falls.   Skin: Negative.  Negative for itching and rash.   Neurological: Positive for tingling and sensory change. Negative for dizziness, tremors, speech change, focal weakness, seizures, weakness and headaches.   Endo/Heme/Allergies: Negative.  Negative for polydipsia. Does not bruise/bleed easily.   Psychiatric/Behavioral: Negative.  Negative for depression, hallucinations, substance abuse and suicidal ideas. The patient is not nervous/anxious and does not have insomnia.         Physical Exam  Temp:  [36.2 °C (97.2 °F)-37 °C (98.6 °F)] 36.9 °C (98.5 °F)  Pulse:  [63-83] 72  Resp:  [16-17] 16  BP: ()/(54-81) 98/60  SpO2:  [96 %-100 %] 96 %    Physical Exam  Vitals signs and nursing note reviewed.   Constitutional:       General: She is not in acute distress.     Appearance: Normal appearance. She is well-developed and normal weight. She is not ill-appearing or diaphoretic.   HENT:      Head: Normocephalic and atraumatic.      Right Ear: Ear canal and external ear normal.      Left Ear: Ear canal and external ear normal.      Nose: Nose normal.      Mouth/Throat:      Mouth: Mucous membranes are moist.      Pharynx: Oropharynx is clear.   Eyes:       Extraocular Movements: Extraocular movements intact.      Conjunctiva/sclera: Conjunctivae normal.      Pupils: Pupils are equal, round, and reactive to light.   Neck:      Musculoskeletal: Normal range of motion and neck supple. No neck rigidity.      Thyroid: No thyromegaly.      Vascular: No carotid bruit or JVD.   Cardiovascular:      Rate and Rhythm: Normal rate and regular rhythm.      Pulses: Normal pulses.      Heart sounds: Murmur present. No gallop.    Pulmonary:      Effort: Pulmonary effort is normal. No respiratory distress.      Breath sounds: Normal breath sounds. No stridor. No wheezing or rales.   Chest:      Chest wall: No tenderness.   Abdominal:      General: Abdomen is flat. Bowel sounds are normal. There is no distension.      Palpations: Abdomen is soft. There is no mass.      Tenderness: There is no guarding or rebound.      Hernia: No hernia is present.   Musculoskeletal: Normal range of motion.         General: Tenderness and signs of injury present. No swelling.      Comments: Left shoulder pain full range of motion   Lymphadenopathy:      Cervical: No cervical adenopathy.   Skin:     General: Skin is warm and dry.      Capillary Refill: Capillary refill takes more than 3 seconds.      Coloration: Skin is not jaundiced or pale.   Neurological:      General: No focal deficit present.      Mental Status: She is alert and oriented to person, place, and time. Mental status is at baseline.      Cranial Nerves: No cranial nerve deficit.   Psychiatric:         Mood and Affect: Mood normal.         Behavior: Behavior normal.         Fluids  No intake or output data in the 24 hours ending 12/03/19 1332    Laboratory                        Imaging  CT-HEAD W/O   Final Result      1.  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  Underlying cerebral volume loss and white matter change      EC-ECHOCARDIOGRAM COMPLETE W/O CONT   Final Result      CT-HEAD W/O   Final Result       1.  No significant change in left convexity subacute subdural hematoma measuring maximum of 9 mm thickness      2.  Minimal mass effect with 2-3 mm left-to-right shift      3.  Underlying cerebral volume loss and white matter change      MR-BRAIN-W/O   Final Result   Addendum 1 of 1   Addendum:   Findings are discussed with the neurosurgeon Dr. Romie Cabrera. She is a    candidate for middle meningeal artery embolization to prevent recurrence.    Please consult interventional radiology.      Final      DX-SHOULDER 2+ LEFT   Final Result      Unremarkable shoulder series.                  INTERPRETING LOCATION:  [address], [place], [address]      DX-CHEST-PORTABLE (1 VIEW)   Final Result      No acute cardiac or pulmonary abnormalities are identified.      CT-CTA HEAD WITH & W/O-POST PROCESS   Final Result      CT angiogram of the [place] within normal limits.      CT-CTA NECK WITH & W/O-POST PROCESSING   Final Result      Mild atherosclerotic disease without evidence of significant stenosis or occlusion.      CT-CEREBRAL PERFUSION ANALYSIS   Final Result      1.  Cerebral blood flow less than 30% likely representing completed infarct = 0 mL.      2.  T Max more than 6 seconds likely representing combination of completed infarct and ischemia = 0 mL.      3.  Mismatched volume likely representing ischemic brain/penumbra = None      4.  Please note that the cerebral perfusion was performed on the limited brain tissue around the basal ganglia region. Infarct/ischemia outside the CT perfusion sections can be missed in this study.      CT-HEAD W/O   Final Result      Left parietal subdural hematoma appears acute-subacute and there is a 2 mm rightward midline shift      Age-appropriate cerebral volume loss.      Moderate white matter hypodensity is present.  This is a nonspecific finding which usually is found to represent chronic microvascular disease in patient's of this demographic.  Demyelination, age  indeterminant ischemia and gliosis are also common    possibilities.      Chronic right caudate head lacunar infarction      I discussed the findings with the patient's neurologist before this interpretation was generated      IR-EMBOLIZE-NEURO-INTRACRANIAL    (Results Pending)        Assessment/Plan  * Subdural hematoma (HCC)  Assessment & Plan  Likely related to her fall in October  Patient is of being evaluated by neurosurgery as well as neurology  On [facility] per neurosurgery recommendations  underwent meningeal artery embolization today  Tolerated procedure very well.  CT head yesterday did not show changes.  PT/OT eval     Murmur, cardiac- (present on admission)  Assessment & Plan  Check echocardiogram  Echo normal EF.    Acquired hypothyroidism  Assessment & Plan  Continue replacement therapy    TIA (transient ischemic attack)  Assessment & Plan  Right-sided weakness could be secondary to TIA versus possible seizure related to subdural hematoma  She was evaluated by neurology  Dr Encarnacion recommended every hour neurochecks with a stat CT if any changes in neurologic status as well as MRI of brain  We will hold off on aspirin given subdural hematoma  We will start her on atorvastatin   Pending further recommendation per neurosurgery  Improving     Left shoulder pain  Assessment & Plan  X-ray reviewed negative for fracture  The patient may need to follow-up as an outpatient with [facility] to further evaluate     I have seen and examined patient on 12/3/2019. I have reviewed vitals, new labs and imaging. I have discussed POC with RN. There are no changes from (12/2/2019) except for what is mentioned above.     VTE prophylaxis: SCD      Current Facility-Administered Medications:   •  NS infusion, , Intravenous, Continuous, Blanca Plummer M.D., Stopped at 12/02/19 1005  •  labetalol (NORMODYNE/TRANDATE) injection 10-20 mg, 10-20 mg, Intravenous, Q4HRS PRN, Errol Castaneda M.D.  •  levothyroxine (SYNTHROID) tablet 100  mcg, 100 mcg, Oral, AM ES, Colin Ortez M.D., 100 mcg at 12/03/19 0518  •  senna-docusate (PERICOLACE or SENOKOT S) 8.6-50 MG per tablet 2 Tab, 2 Tab, Oral, BID, 2 Tab at 12/02/19 6528 **AND** polyethylene glycol/lytes (MIRALAX) PACKET 1 Packet, 1 Packet, Oral, QDAY PRN **AND** magnesium hydroxide (MILK OF MAGNESIA) suspension 30 mL, 30 mL, Oral, QDAY PRN **AND** bisacodyl (DULCOLAX) suppository 10 mg, 10 mg, Rectal, QDAY PRN, Colin Ortez M.D.  •  acetaminophen (TYLENOL) tablet 650 mg, 650 mg, Oral, Q6HRS PRN, Colin Ortez M.D., 650 mg at 12/02/19 7373  •  ondansetron (ZOFRAN) syringe/vial injection 4 mg, 4 mg, Intravenous, Q4HRS PRN, Colin Ortez M.D.  •  ondansetron (ZOFRAN ODT) dispertab 4 mg, 4 mg, Oral, Q4HRS PRN, Colin Ortez M.D.  •  atorvastatin (LIPITOR) tablet 40 mg, 40 mg, Oral, Q EVENING, Colin Ortez M.D., 40 mg at 12/02/19 2771

## 2019-12-03 NOTE — CARE PLAN
Problem: Communication  Goal: The ability to communicate needs accurately and effectively will improve  Outcome: PROGRESSING AS EXPECTED  Note:   Effectively communicates needs with staff     Problem: Venous Thromboembolism (VTW)/Deep Vein Thrombosis (DVT) Prevention:  Goal: Patient will participate in Venous Thrombosis (VTE)/Deep Vein Thrombosis (DVT)Prevention Measures  Outcome: PROGRESSING AS EXPECTED  Note:   SCDs in place while in bed     Problem: Pain Management  Goal: Pain level will decrease to patient's comfort goal  Outcome: PROGRESSING AS EXPECTED  Note:   Pain level within acceptable limits with use of PRN medications

## 2019-12-03 NOTE — THERAPY
"Physical Therapy Evaluation completed.   Bed Mobility:  Supine to Sit: (with up CNA)  Transfers: Sit to Stand: Supervised  Gait: Level Of Assist: Supervised with No Equipment Needed       Plan of Care: Patient with no further skilled PT needs in the acute care setting at this time  Discharge Recommendations: Equipment: No Equipment Needed. Post-acute therapy: Recommend outpatient physical therapy services to address higher level deficits.     Ms. Mckeon is a 83 y/o female s/p embolization after admission with right sided weakness and numbness. Pt was up using restroom with CNA at start of physical therapy evaluation. Pt demonstrated transfers and gait at supervision. At EOB, LE strength and sensation were assessed, equal bilateral. During gait, pt demonstrated decreased naina though reports this is baseline, no loss of balance though pt did report mild dizziness which remained unchanged throughout evaluation. Pt was educated on use of cane post-acute for reduced risk of falls, pt verbalized understand and reports she has SPC at home. Stair assessment was completed, pt ascended and descend steps at mod I, demonstrating safety when entering/exiting home. Bed mobility was not assessed during PT evaluation as patient was up self, based on functional mobility demonstrated during session it is anticipated that pt will have no difficulty with bed mobility. No additional acute PT is needed as pt is performing functional mobility at baseline. Recommend outpatient physical therapy if patient continues to have dizziness with ambulation post-acute.    See \"Rehab Therapy-Acute\" Patient Summary Report for complete documentation.         "

## 2019-12-03 NOTE — PROGRESS NOTES
Neurosurgery Progress Note    Subjective:  No acute events  emobolization yesterday  Denies pain    Exam:  A&O, speech fluent & appropriate  Incision site CDI with bandage  FS x4, sensation grossly intact  PERRL, EOM intact, facial movements symmetric, tongue midline  No drift    BP  Min: 98/60  Max: 139/65  Pulse  Av.4  Min: 56  Max: 83  Resp  Av.8  Min: 16  Max: 20  Temp  Av.6 °C (97.9 °F)  Min: 36.2 °C (97.2 °F)  Max: 37 °C (98.6 °F)  SpO2  Av.8 %  Min: 96 %  Max: 100 %    No data recorded                      Intake/Output       19 - 19 - 19 0659      [id number] 2717-9432 Total 2978-7752 5193-8890 Total       Intake    I.V.  1250  -- 1250  --  -- --    Volume (mL) (Lactated Ringers) 1050 -- 1050 -- -- --    Volume (mL) (lactated ringers infusion) 200 -- 200 -- -- --    Total Intake 1250 -- 1250 -- -- --       Output    Urine  --  -- --  --  -- --    Number of Times Voided 1 x 2 x 3 x 1 x -- 1 x    Blood  100  -- 100  --  -- --    Est. Blood Loss 100 -- 100 -- -- --    Total Output 100 -- 100 -- -- --       Net I/O     1150 -- 1150 -- -- --            Intake/Output Summary (Last 24 hours) at 12/3/2019 0933  Last data filed at 2019 1300  Gross per 24 hour   Intake 1250 ml   Output 100 ml   Net 1150 ml            • NS   Continuous   • labetalol  10-20 mg Q4HRS PRN   • levothyroxine  100 mcg AM ES   • senna-docusate  2 Tab BID    And   • polyethylene glycol/lytes  1 Packet QDAY PRN    And   • magnesium hydroxide  30 mL QDAY PRN    And   • bisacodyl  10 mg QDAY PRN   • acetaminophen  650 mg Q6HRS PRN   • ondansetron  4 mg Q4HRS PRN   • ondansetron  4 mg Q4HRS PRN   • atorvastatin  40 mg Q EVENING   • levETIRAcetam  500 mg BID     CT-HEAD W/O   Final Result      1.  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  Underlying cerebral volume loss and white matter change      EC-ECHOCARDIOGRAM COMPLETE W/O CONT   Final Result       CT-HEAD W/O   Final Result      1.  No significant change in left convexity subacute subdural hematoma measuring maximum of 9 mm thickness      2.  Minimal mass effect with 2-3 mm left-to-right shift      3.  Underlying cerebral volume loss and white matter change      MR-BRAIN-W/O   Final Result   Addendum 1 of 1   Addendum:   Findings are discussed with the neurosurgeon Dr. Romie Cabrera. She is a    candidate for middle meningeal artery embolization to prevent recurrence.    Please consult interventional radiology.      Final      DX-SHOULDER 2+ LEFT   Final Result      Unremarkable shoulder series.                  INTERPRETING LOCATION:  [address], [place], [address]      DX-CHEST-PORTABLE (1 VIEW)   Final Result      No acute cardiac or pulmonary abnormalities are identified.      CT-CTA HEAD WITH & W/O-POST PROCESS   Final Result      CT angiogram of the [place] within normal limits.      CT-CTA NECK WITH & W/O-POST PROCESSING   Final Result      Mild atherosclerotic disease without evidence of significant stenosis or occlusion.      CT-CEREBRAL PERFUSION ANALYSIS   Final Result      1.  Cerebral blood flow less than 30% likely representing completed infarct = 0 mL.      2.  T Max more than 6 seconds likely representing combination of completed infarct and ischemia = 0 mL.      3.  Mismatched volume likely representing ischemic brain/penumbra = None      4.  Please note that the cerebral perfusion was performed on the limited brain tissue around the basal ganglia region. Infarct/ischemia outside the CT perfusion sections can be missed in this study.      CT-HEAD W/O   Final Result      Left parietal subdural hematoma appears acute-subacute and there is a 2 mm rightward midline shift      Age-appropriate cerebral volume loss.      Moderate white matter hypodensity is present.  This is a nonspecific finding which usually is found to represent chronic microvascular disease in patient's of this  demographic.  Demyelination, age indeterminant ischemia and gliosis are also common    possibilities.      Chronic right caudate head lacunar infarction      I discussed the findings with the patient's neurologist before this interpretation was generated      IR-EMBOLIZE-NEURO-INTRACRANIAL    (Results Pending)         Assessment and Plan:  Hospital day #5  POD# 1 embolization  Chemical prophylactic DVT therapy: no  Start date/time: no    Neuro stable    Plan:  Mobilize as tolerated  OK to [place] [facility] when medically cleared  Follow up at [facility] Neurosurgery. Our office will contact pt to schedule this

## 2019-12-03 NOTE — THERAPY
"Speech Language Therapy Evaluation completed to address cognition  Functional Status:  Patient seen this date for cognitive-linguistic evaluation. Patient was administered a combination of standard and non-standard assessments. Patient was awake, alert, oriented, pleasant, and cooperative during evaluation. Patient presented WNL across all domains tested. Patient was educated on TBI, side effects, and to obtain outpatient SLP services if any deficits arise. Patient verbalized understanding of all education and all questions answered. At this time, patient does not appear to require any further acute or post-acute SLP services for cognition. Please re-consult SLP if any deficits arise or any changes in status occur. RN aware.    Recommendations:  At this time, patient presented WNL across all domains tested. Patient does not appear to require any further acute or post-acute SLP services for cognition. Please re-consult SLP if any deficits arise or any changes in status occur.  Plan of Care: Patient with no further skilled SLP needs in the acute care setting at this time  Post-Acute Therapy: SLP evaluation completed. Patient is currently not being actively followed for therapy services at this time, however may be seen if requested by attending provider for 1 more visit within 30 days to address any discharge needs or if the patient has a change in status.      See \"Rehab Therapy-Acute\" Patient Summary Report for complete documentation.    "

## 2019-12-04 ENCOUNTER — HOME HEALTH ADMISSION (OUTPATIENT)
Dept: HOME HEALTH SERVICES | Facility: HOME HEALTHCARE | Age: 84
End: 2019-12-04
Payer: MEDICARE

## 2019-12-04 ENCOUNTER — PATIENT OUTREACH (OUTPATIENT)
Dept: HEALTH INFORMATION MANAGEMENT | Facility: OTHER | Age: 84
End: 2019-12-04

## 2019-12-04 VITALS
RESPIRATION RATE: 16 BRPM | DIASTOLIC BLOOD PRESSURE: 63 MMHG | HEIGHT: 61 IN | OXYGEN SATURATION: 94 % | BODY MASS INDEX: 26.81 KG/M2 | WEIGHT: 141.98 LBS | HEART RATE: 84 BPM | TEMPERATURE: 98.6 F | SYSTOLIC BLOOD PRESSURE: 128 MMHG

## 2019-12-04 LAB
ANION GAP SERPL CALC-SCNC: 12 MMOL/L (ref 0–11.9)
BUN SERPL-MCNC: 13 MG/DL (ref 8–22)
CALCIUM SERPL-MCNC: 8.4 MG/DL (ref 8.5–10.5)
CHLORIDE SERPL-SCNC: 109 MMOL/L (ref 96–112)
CO2 SERPL-SCNC: 21 MMOL/L (ref 20–33)
CREAT SERPL-MCNC: 0.87 MG/DL (ref 0.5–1.4)
ERYTHROCYTE [DISTWIDTH] IN BLOOD BY AUTOMATED COUNT: 46.6 FL (ref 35.9–50)
GLUCOSE SERPL-MCNC: 119 MG/DL (ref 65–99)
HCT VFR BLD AUTO: 37.8 % (ref 37–47)
HGB BLD-MCNC: 12.8 G/DL (ref 12–16)
MCH RBC QN AUTO: 29.8 PG (ref 27–33)
MCHC RBC AUTO-ENTMCNC: 33.9 G/DL (ref 33.6–35)
MCV RBC AUTO: 88.1 FL (ref 81.4–97.8)
PLATELET # BLD AUTO: 130 K/UL (ref 164–446)
PMV BLD AUTO: 11.8 FL (ref 9–12.9)
POTASSIUM SERPL-SCNC: 3.9 MMOL/L (ref 3.6–5.5)
RBC # BLD AUTO: 4.29 M/UL (ref 4.2–5.4)
SODIUM SERPL-SCNC: 142 MMOL/L (ref 135–145)
WBC # BLD AUTO: 9.6 K/UL (ref 4.8–10.8)

## 2019-12-04 PROCEDURE — 80048 BASIC METABOLIC PNL TOTAL CA: CPT

## 2019-12-04 PROCEDURE — 700102 HCHG RX REV CODE 250 W/ 637 OVERRIDE(OP): Performed by: HOSPITALIST

## 2019-12-04 PROCEDURE — A9270 NON-COVERED ITEM OR SERVICE: HCPCS | Performed by: HOSPITALIST

## 2019-12-04 PROCEDURE — 36415 COLL VENOUS BLD VENIPUNCTURE: CPT

## 2019-12-04 PROCEDURE — 85027 COMPLETE CBC AUTOMATED: CPT

## 2019-12-04 PROCEDURE — 99239 HOSP IP/OBS DSCHRG MGMT >30: CPT | Performed by: INTERNAL MEDICINE

## 2019-12-04 RX ORDER — ATORVASTATIN CALCIUM 40 MG/1
40 TABLET, FILM COATED ORAL EVERY EVENING
Qty: 30 TAB | Refills: 0 | Status: SHIPPED | OUTPATIENT
Start: 2019-12-04 | End: 2021-01-19

## 2019-12-04 RX ADMIN — LEVOTHYROXINE SODIUM 100 MCG: 25 TABLET ORAL at 04:21

## 2019-12-04 NOTE — DISCHARGE PLANNING
Received Choice form at 7076  Agency/Facility Name: [facility]  Referral sent per Choice form @ 0033

## 2019-12-04 NOTE — DISCHARGE PLANNING
ATTN: Case Management  RE: Referral for Home Health    HH is accepting patient but will not be able to see until 12/6-12/7. JENNIFER Jean is aware and Dr Bean agrees that the patient can be seen on these dates.   As of 12/4/2019, we have accepted the Home Health referral for the patient listed above.    A [facility] clinician will be out to see the patient within 48 hours. If you have any questions or concerns regarding the patient’s transition to Home Health, please do not hesitate to contact us at [phone].      We look forward to collaborating with you,  [facility] Home Health Team

## 2019-12-04 NOTE — DISCHARGE PLANNING
Michelle is not requiring 2 of 3 disciplines.  TCC will no longer follow.  Please re-consult for further review.

## 2019-12-04 NOTE — DISCHARGE PLANNING
Patient does not have a PCP to follow for HH. Please schedule patient to establish with a PCP and provide HH with the information. Once patient sees their PCP we will schedule an HH evaluation    Thank you

## 2019-12-04 NOTE — FACE TO FACE
Face to Face Supporting Documentation - Home Health    The encounter with this patient was in whole or in part the primary reason for home health admission.    Date of encounter:   Patient:                    MRN:                       YOB: 2018  Michelle Mckeon  [id number]  1935     Home health to see patient for:  Physical Therapy evaluation and treatment and Occupational therapy evaluation and treatment    Homebound status evidenced by:  Need the aid of supportive devices such as crutches, canes, wheelchairs or walkers. Leaving home requires a considerable and taxing effort. There is a normal inability to leave the home.    Community Physician to provide follow up care: No primary care provider on file.     Optional Interventions? No      I certify the face to face encounter for this home health care referral meets the CMS requirements and the encounter/clinical assessment with the patient was, in whole, or in part, for the medical condition(s) listed above, which is the primary reason for home health care. Based on my clinical findings: the service(s) are medically necessary, support the need for home health care, and the homebound criteria are met.  I certify that this patient has had a face to face encounter by myself.  Timoteo Baen M.D. - NPI: [id number]

## 2019-12-04 NOTE — DISCHARGE INSTRUCTIONS
Discharge Instructions    Discharged to home by car with relative. Discharged via wheelchair, hospital escort: Yes.  Special equipment needed: Not Applicable    Be sure to schedule a follow-up appointment with your primary care doctor or any specialists as instructed.     Discharge Plan:   Diet Plan: Discussed  Activity Level: Discussed  Confirmed Follow up Appointment: Patient to Call and Schedule Appointment  Confirmed Symptoms Management: Discussed  Medication Reconciliation Updated: Yes  Influenza Vaccine Indication: Not indicated: Previously immunized this influenza season and > 8 years of age    I understand that a diet low in cholesterol, fat, and sodium is recommended for good health. Unless I have been given specific instructions below for another diet, I accept this instruction as my diet prescription.   Other diet: N/A    Special Instructions:   None    · Is patient discharged on Warfarin / Coumadin?   No       Subdural Hematoma  A subdural hematoma is a collection of blood between the brain and its tough outermost membrane covering (the dura).  Blood clots that form in this area push down on the brain and cause irritation. A subdural hematoma may cause parts of the brain to stop working and eventually cause death.   CAUSES  A subdural hematoma is caused by bleeding from a ruptured blood vessel (hemorrhage). The bleeding results from trauma to the head, such as from a fall or motor vehicle accident.  There are two types of subdural hemorrhages:  · Acute. This type develops shortly after a serious blow to the head and causes blood to collect very quickly. If not diagnosed and treated promptly, severe brain injury or death can occur.  · Chronic. This is when bleeding develops more slowly, over weeks or months.  RISK FACTORS  People at risk for subdural hematoma include older persons, infants, and alcoholics.  SYMPTOMS  An acute subdural hemorrhage develops over minutes to hours. Symptoms can  include:  · Temporary loss of consciousness.  · Weakness of arms or legs on one side of the body.  · Changes in vision or speech.  · A severe headache.  · Seizures.  · Nausea and vomiting.  · Increased sleepiness.  A chronic subdural hemorrhage develops over weeks to months. Symptoms may develop slowly and produce less noticeable problems or changes. Symptoms include:  · A mild headache.  · A change in personality.  · Loss of balance or difficulty walking.  · Weakness, numbness, or tingling in the arms or legs.  · Nausea or vomiting.  · Memory loss.  · Double vision.  · Increased sleepiness.  DIAGNOSIS  Your health care provider will perform a thorough physical and neurological exam. A CT scan or MRI may also be done. If there is blood on the scan, its color will help your health care provider determine how long the hemorrhage has been there.  TREATMENT  If the cause is an acute subdural hemorrhage, immediate treatment is needed. In many cases an emergency surgery is performed to drain accumulated blood or to remove the blood clot. Sometimes steroid or diuretic medicines or controlled breathing through a ventilator is needed to decrease pressure in the brain. This is especially true if there is any swelling of the brain.  If the cause is a chronic subdural hemorrhage, treatment depends on a variety of factors. Sometimes no treatment is needed. If the subdural hematoma is small and causes minimal or no symptoms, you may be treated with bed rest, medicines, and observation. If the hemorrhage is large or if you have neurological symptoms, an emergency surgery is usually needed to remove the blood clot.  People who develop a subdural hemorrhage are at risk of developing seizures, even after the subdural hematoma has been treated. You may be prescribed an anti-seizure (anticonvulsant) medicine for a year or longer.  HOME CARE INSTRUCTIONS  · Only take medicines as directed by your health care provider.  · Rest if  directed by your health care provider.  · Keep all follow-up appointments with your health care provider.  · If you play a contact sport such as football, hockey or soccer and you experienced a significant head injury, allow enough time for healing (up to 15 days) before you start playing again. A repeated injury that occurs during this fragile repair period is likely to result in hemorrhage. This is called the second impact syndrome.  SEEK IMMEDIATE MEDICAL CARE IF:  · You fall or experience minor trauma to your head and you are taking blood thinners. If you are on any blood thinners even a very small injury can cause a subdural hematoma. You should not hesitate to seek medical attention regardless of how minor you think your symptoms are.  · You experience a head injury and have:  ¨ Drowsiness or a decrease in alertness.  ¨ Confusion or forgetfulness.  ¨ Slurred speech.  ¨ Irrational or aggressive behavior.  ¨ Numbness or paralysis in any part of the body.  ¨ A feeling of being sick to your stomach (nauseous) or you throw up (vomit).  ¨ Difficulty walking or poor coordination.  ¨ Double vision.  ¨ Seizures.  ¨ A bleeding disorder.  ¨ A history of heavy alcohol use.  ¨ Clear fluid draining from your nose or ears.  ¨ Personality changes.  ¨ Difficulty thinking.  ¨ Worsening symptoms.  MAKE SURE YOU:  · Understand these instructions.  · Will watch your condition.  · Will get help right away if you are not doing well or get worse.  FOR MORE INFORMATION  National [place] of Neurological Disorders and Stroke: www.ninds.nih.gov  American Association of Neurological Surgeons: www.neurosurgerytoday.org  American Academy of Neurology (AAN): www.aan.com  Brain Injury Association of [place]: www.biausa.org  This information is not intended to replace advice given to you by your health care provider. Make sure you discuss any questions you have with your health care provider.  Document Released: 11/04/2005 Document Revised:  10/08/2014 Document Reviewed: 06/20/2014  [employer] Interactive Patient Education © 2017 Elsevier Inc.    Atorvastatin tablets  What is this medicine?  ATORVASTATIN (a TORE va sta tin) is known as a HMG-CoA reductase inhibitor or 'statin'. It lowers the level of cholesterol and triglycerides in the blood. This drug may also reduce the risk of heart attack, stroke, or other health problems in patients with risk factors for heart disease. Diet and lifestyle changes are often used with this drug.  This medicine may be used for other purposes; ask your health care provider or pharmacist if you have questions.  COMMON BRAND NAME(S): Lipitor  What should I tell my health care provider before I take this medicine?  They need to know if you have any of these conditions:  -frequently drink alcoholic beverages  -history of stroke, TIA  -kidney disease  -liver disease  -muscle aches or weakness  -other medical condition  -an unusual or allergic reaction to atorvastatin, other medicines, foods, dyes, or preservatives  -pregnant or trying to get pregnant  -breast-feeding  How should I use this medicine?  Take this medicine by mouth with a glass of water. Follow the directions on the prescription label. You can take this medicine with or without food. Take your doses at regular intervals. Do not take your medicine more often than directed.  Talk to your pediatrician regarding the use of this medicine in children. While this drug may be prescribed for children as young as 10 years old for selected conditions, precautions do apply.  Overdosage: If you think you have taken too much of this medicine contact a poison control center or emergency room at once.  NOTE: This medicine is only for you. Do not share this medicine with others.  What if I miss a dose?  If you miss a dose, take it as soon as you can. If it is almost time for your next dose, take only that dose. Do not take double or extra doses.  What may interact with this  medicine?  Do not take this medicine with any of the following medications:  -red yeast rice  -telaprevir  -telithromycin  -voriconazole  This medicine may also interact with the following medications:  -alcohol  -antiviral medicines for HIV or AIDS  -boceprevir  -certain antibiotics like clarithromycin, erythromycin, troleandomycin  -certain medicines for cholesterol like fenofibrate or gemfibrozil  -cimetidine  -clarithromycin  -colchicine  -cyclosporine  -digoxin  -female hormones, like estrogens or progestins and birth control pills  -grapefruit juice  -medicines for fungal infections like fluconazole, itraconazole, ketoconazole  -niacin  -rifampin  -spironolactone  This list may not describe all possible interactions. Give your health care provider a list of all the medicines, herbs, non-prescription drugs, or dietary supplements you use. Also tell them if you smoke, drink alcohol, or use illegal drugs. Some items may interact with your medicine.  What should I watch for while using this medicine?  Visit your doctor or health care professional for regular check-ups. You may need regular tests to make sure your liver is working properly.  Tell your doctor or health care professional right away if you get any unexplained muscle pain, tenderness, or weakness, especially if you also have a fever and tiredness. Your doctor or health care professional may tell you to stop taking this medicine if you develop muscle problems. If your muscle problems do not go away after stopping this medicine, contact your health care professional.  This drug is only part of a total heart-health program. Your doctor or a dietician can suggest a low-cholesterol and low-fat diet to help. Avoid alcohol and smoking, and keep a proper exercise schedule.  Do not use this drug if you are pregnant or breast-feeding. Serious side effects to an unborn child or to an infant are possible. Talk to your doctor or pharmacist for more  information.  This medicine may affect blood sugar levels. If you have diabetes, check with your doctor or health care professional before you change your diet or the dose of your diabetic medicine.  If you are going to have surgery tell your health care professional that you are taking this drug.  What side effects may I notice from receiving this medicine?  Side effects that you should report to your doctor or health care professional as soon as possible:  -allergic reactions like skin rash, itching or hives, swelling of the face, lips, or tongue  -dark urine  -fever  -joint pain  -muscle cramps, pain  -redness, blistering, peeling or loosening of the skin, including inside the mouth  -trouble passing urine or change in the amount of urine  -unusually weak or tired  -yellowing of eyes or skin  Side effects that usually do not require medical attention (report to your doctor or health care professional if they continue or are bothersome):  -constipation  -heartburn  -stomach gas, pain, upset  This list may not describe all possible side effects. Call your doctor for medical advice about side effects. You may report side effects to FDA at 7-885-FDA-9797.  Where should I keep my medicine?  Keep out of the reach of children.  Store at room temperature between 20 to 25 degrees C (68 to 77 degrees F). Throw away any unused medicine after the expiration date.  NOTE: This sheet is a summary. It may not cover all possible information. If you have questions about this medicine, talk to your doctor, pharmacist, or health care provider.  © 2018 Elsevier/Gold Standard (2012-11-06 09:18:24)    Depression / Suicide Risk    As you are discharged from this [facility] facility, it is important to learn how to keep safe from harming yourself.    Recognize the warning signs:  · Abrupt changes in personality, positive or negative- including increase in energy   · Giving away possessions  · Change in eating patterns- significant  weight changes-  positive or negative  · Change in sleeping patterns- unable to sleep or sleeping all the time   · Unwillingness or inability to communicate  · Depression  · Unusual sadness, discouragement and loneliness  · Talk of wanting to die  · Neglect of personal appearance   · Rebelliousness- reckless behavior  · Withdrawal from people/activities they love  · Confusion- inability to concentrate     If you or a loved one observes any of these behaviors or has concerns about self-harm, here's what you can do:  · Talk about it- your feelings and reasons for harming yourself  · Remove any means that you might use to hurt yourself (examples: pills, rope, extension cords, firearm)  · Get professional help from the community (Mental Health, Substance Abuse, psychological counseling)  · Do not be alone:Call your Safe Contact- someone whom you trust who will be there for you.  · Call your local CRISIS HOTLINE 401-1750 or 950-406-6214  · Call your local Children's Mobile Crisis Response Team [place] (695) 909-8881 or www.[employer]  · Call the toll free National Suicide Prevention Hotlines   · National Suicide Prevention Lifeline 237-068-KFSU (8131)  · National Hope Line Network 800-SUICIDE (309-0768)

## 2019-12-04 NOTE — DISCHARGE PLANNING
Anticipated Disposition  [facility]    Action:   Met with Michelle in her room, she is ready to be discharged. She lives with daughter, daughter is able to provide care and ride. Pt has a cane, refused to have FWW. She uses [facility].   [facility] selected, signed and faxed to [facility].     PCP Mary Grace WILBURN          Barriers to Discharge:   Medical clearance.   [facility] acceptance.       Plan  Follow up with [facility] for acceptance.       Care Transition Team Assessment    Information Source  Orientation : Oriented x 4  Information Given By: Patient  Informant's Name: Michelle  Who is responsible for making decisions for patient? : Patient         Elopement Risk  Legal Hold: No  Ambulatory or Self Mobile in Wheelchair: Yes  Disoriented: No  Psychiatric Symptoms: None  History of Wandering: No  Elopement this Admit: No  Vocalizing Wanting to Leave: No  Displays Behaviors, Body Language Wanting to Leave: No-Not at Risk for Elopement  Elopement Risk: Not at Risk for Elopement    Interdisciplinary Discharge Planning  Does Admitting Nurse Feel This Could be a Complex Discharge?: No  Primary Care Physician:   Lives with - Patient's Self Care Capacity: Adult Children  Patient or legal guardian wants to designate a caregiver (see row info): No  Support Systems: Children, Family Member(s)  Housing / Facility: [address]  Do You Take your Prescribed Medications Regularly: Yes  Able to Return to Previous ADL's: Yes  Mobility Issues: No  Prior Services: None, Home-Independent  Patient Expects to be Discharged to:: Home with Home Health   Assistance Needed: Yes  Durable Medical Equipment: (Pt refused to have a FWW, has a cane at home)    Discharge Preparedness  What is your plan after discharge?: Home health care  What are your discharge supports?: Child  Prior Functional Level: Ambulatory, Drives Self, Independent with Activities of Daily Living    Functional Assesment  Prior Functional Level: Ambulatory, Drives  Self, Independent with Activities of Daily Living    Finances  Financial Barriers to Discharge: No  Prescription Coverage: Yes    Vision / Hearing Impairment  Vision Impairment : Yes  Hearing Impairment : Yes  Hearing Impairment: Both Ears, Hearing Device Not Available  Does Pt Need Special Equipment for the Hearing Impaired?: No              Domestic Abuse  Have you ever been the victim of abuse or violence?: No  Physical Abuse or Sexual Abuse: No  Verbal Abuse or Emotional Abuse: No  Possible Abuse Reported to:: Not Applicable         Discharge Risks or Barriers  Discharge risks or barriers?: No    Anticipated Discharge Information  Anticipated discharge disposition: Home  Discharge Address: [facility] [place]

## 2019-12-04 NOTE — DISCHARGE SUMMARY
Discharge Summary    CHIEF COMPLAINT ON ADMISSION  Chief Complaint   Patient presents with   • Possible Stroke     Pt BIB EMS, possible stroke like symptoms. pt with right sided defecits, onset 1040. pt A&Ox4. BG 84 PTA       Reason for Admission  STROKE     Admission Date  11/29/2019    CODE STATUS  Full Code    HPI & HOSPITAL COURSE  This is a 84 y.o. female with history of hypothyroidism who was admitted on 11/29/19 after presenting to ER complaining of right side weakness associated with numbness and tingling and right facial weakness. Symptoms lasted all and all for about 2 hours and gradually resolved. Neurology was consulted In ER she had a CT head and was found to have subdural hematoma so neurosurgery was consulted. Pt was initially admitted to ICU for close observation and was started on keppra. She had a repeat CT head in 24 hr and there was no changes. Pt was transferred from ICU to neuro floor. Neurosurgery recommended a left middle cerebral embolization which was done on 12/2/19 by IR.  Pt tolerated procedure well.  Pt now doing better. She was seen by physical therapy and home health has been recommended hitch has been arranged.   Per neurosurgery stand point pt can be discharged home and will follow as outpt with neurosurgery.  Will discharge pt home today     The patient met 2-midnight criteria for an inpatient stay at the time of discharge.    Discharge Date  12/4/19    FOLLOW UP ITEMS POST DISCHARGE  Neurosurgery     DISCHARGE DIAGNOSES  Principal Problem:    Subdural hematoma (HCC) POA: Unknown  Active Problems:    Left shoulder pain POA: Unknown    TIA (transient ischemic attack) POA: Unknown    Acquired hypothyroidism POA: Unknown    Murmur, cardiac POA: Yes  Resolved Problems:    * No resolved hospital problems. *      FOLLOW UP  No future appointments.  Tammy Rowland M.D.  [address] [address]  [phone]    Call        MEDICATIONS ON DISCHARGE     Medication List       START taking these medications      Instructions   atorvastatin 40 MG Tabs  Commonly known as:  LIPITOR   Take 1 Tab by mouth every evening.  Dose:  40 mg        CONTINUE taking these medications      Instructions   levothyroxine 100 MCG Tabs  Commonly known as:  SYNTHROID   Take 100 mcg by mouth Every morning on an empty stomach.  Dose:  100 mcg        STOP taking these medications    ibuprofen 200 MG Tabs  Commonly known as:  MOTRIN            Allergies  Allergies   Allergen Reactions   • Sulfa Drugs Itching       DIET  Orders Placed This Encounter   Procedures   • Diet Order Regular     Standing Status:   Standing     Number of Occurrences:   1     Order Specific Question:   Diet:     Answer:   Regular [1]       ACTIVITY  As tolerated.  Weight bearing as tolerated    CONSULTATIONS  Neurosurgery   Neurology    PROCEDURES   left MMA embolization    LABORATORY  Lab Results   Component Value Date    SODIUM 142 12/04/2019    POTASSIUM 3.9 12/04/2019    CHLORIDE 109 12/04/2019    CO2 21 12/04/2019    GLUCOSE 119 (H) 12/04/2019    BUN 13 12/04/2019    CREATININE 0.87 12/04/2019        Lab Results   Component Value Date    WBC 9.6 12/04/2019    HEMOGLOBIN 12.8 12/04/2019    HEMATOCRIT 37.8 12/04/2019    PLATELETCT 130 (L) 12/04/2019        Total time of the discharge process exceeds 42 minutes.

## 2019-12-05 NOTE — PROGRESS NOTES
Discharge instructions  provided and reviewed w/pt. Pt instructed on where to  medications. All questions answered. Pt left w/hospital escort via wheel chair. All belongings taken.

## 2019-12-07 ENCOUNTER — HOME CARE VISIT (OUTPATIENT)
Dept: HOME HEALTH SERVICES | Facility: HOME HEALTHCARE | Age: 84
End: 2019-12-07
Payer: MEDICARE

## 2019-12-07 VITALS
BODY MASS INDEX: 26.81 KG/M2 | SYSTOLIC BLOOD PRESSURE: 136 MMHG | DIASTOLIC BLOOD PRESSURE: 78 MMHG | OXYGEN SATURATION: 97 % | HEART RATE: 74 BPM | RESPIRATION RATE: 18 BRPM | TEMPERATURE: 97.7 F | HEIGHT: 61 IN | WEIGHT: 142 LBS

## 2019-12-07 PROCEDURE — 665999 HH PPS REVENUE DEBIT

## 2019-12-07 PROCEDURE — 665998 HH PPS REVENUE CREDIT

## 2019-12-07 PROCEDURE — G0493 RN CARE EA 15 MIN HH/HOSPICE: HCPCS

## 2019-12-07 PROCEDURE — 665001 SOC-HOME HEALTH

## 2019-12-07 ASSESSMENT — ENCOUNTER SYMPTOMS
NAUSEA: DENIES
VOMITING: DENIES
SHORTNESS OF BREATH: T

## 2019-12-07 ASSESSMENT — PATIENT HEALTH QUESTIONNAIRE - PHQ9
1. LITTLE INTEREST OR PLEASURE IN DOING THINGS: 00
2. FEELING DOWN, DEPRESSED, IRRITABLE, OR HOPELESS: 00

## 2019-12-08 PROCEDURE — 665998 HH PPS REVENUE CREDIT

## 2019-12-08 PROCEDURE — 665999 HH PPS REVENUE DEBIT

## 2019-12-08 ASSESSMENT — PATIENT HEALTH QUESTIONNAIRE - PHQ9: CLINICAL INTERPRETATION OF PHQ2 SCORE: 0

## 2019-12-09 ENCOUNTER — ANTICOAGULATION MONITORING (OUTPATIENT)
Dept: VASCULAR LAB | Facility: MEDICAL CENTER | Age: 84
End: 2019-12-09

## 2019-12-09 ENCOUNTER — HOME CARE VISIT (OUTPATIENT)
Dept: HOME HEALTH SERVICES | Facility: HOME HEALTHCARE | Age: 84
End: 2019-12-09
Payer: MEDICARE

## 2019-12-09 VITALS
SYSTOLIC BLOOD PRESSURE: 120 MMHG | RESPIRATION RATE: 18 BRPM | DIASTOLIC BLOOD PRESSURE: 80 MMHG | HEART RATE: 83 BPM | OXYGEN SATURATION: 95 %

## 2019-12-09 PROCEDURE — 665998 HH PPS REVENUE CREDIT

## 2019-12-09 PROCEDURE — 665999 HH PPS REVENUE DEBIT

## 2019-12-09 PROCEDURE — G0152 HHCP-SERV OF OT,EA 15 MIN: HCPCS

## 2019-12-09 SDOH — ECONOMIC STABILITY: HOUSING INSECURITY
HOME SAFETY: HOME WAS UNCLUTTERED. CAT HAIR PRESENT ON FLOORS AND CHAIR AS THE HOUSEHOLD HAD 4 CATS. PT HAS HOMECARE BINDER IN HOME WITH NEEDED PHONE NUMBERS. THERAPIST NUMBER WRITTEN ON PT'S HOMECARE BINDER.

## 2019-12-09 ASSESSMENT — ACTIVITIES OF DAILY LIVING (ADL)
TOILETING: INDEPENDENT
SHOPPING ASSESSED: 1
BATHING ASSESSED: 1
FEEDING ASSESSED: 1
HOUSEKEEPING ASSESSED: 1
GROOMING_CURRENT_FUNCTION: INDEPENDENT
PREPARING MEALS: INDEPENDENT
LAUNDRY ASSESSED: 1
CURRENT_FUNCTION: INDEPENDENT
GROOMING ASSESSED: 1
PHYSICAL TRANSFERS ASSESSED: 1
FEEDING: INDEPENDENT
DRESSING_LB_CURRENT_FUNCTION: INDEPENDENT
TELEPHONE USE ASSESSED: 1
USING THE TELPHONE: INDEPENDENT
DRESSING_UB_CURRENT_FUNCTION: INDEPENDENT
LIGHT HOUSEKEEPING: NEEDS ASSISTANCE
AMBULATION ASSISTANCE: 1
TOILETING: 1
SHOPPING: NEEDS ASSISTANCE
BATHING_CURRENT_FUNCTION: INDEPENDENT
LAUNDRY: INDEPENDENT
AMBULATION ASSISTANCE: INDEPENDENT

## 2019-12-09 NOTE — PROGRESS NOTES
Received referral from [facility]. Medications reviewed. No clinically significant interactions noted.

## 2019-12-10 ENCOUNTER — HOME CARE VISIT (OUTPATIENT)
Dept: HOME HEALTH SERVICES | Facility: HOME HEALTHCARE | Age: 84
End: 2019-12-10
Payer: MEDICARE

## 2019-12-10 VITALS
OXYGEN SATURATION: 96 % | RESPIRATION RATE: 18 BRPM | DIASTOLIC BLOOD PRESSURE: 78 MMHG | SYSTOLIC BLOOD PRESSURE: 124 MMHG | TEMPERATURE: 98.3 F | HEART RATE: 70 BPM

## 2019-12-10 PROCEDURE — 665998 HH PPS REVENUE CREDIT

## 2019-12-10 PROCEDURE — 665999 HH PPS REVENUE DEBIT

## 2019-12-10 PROCEDURE — G0299 HHS/HOSPICE OF RN EA 15 MIN: HCPCS

## 2019-12-10 ASSESSMENT — ENCOUNTER SYMPTOMS
VOMITING: DENIES
NAUSEA: DENIES

## 2019-12-11 ENCOUNTER — HOME CARE VISIT (OUTPATIENT)
Dept: HOME HEALTH SERVICES | Facility: HOME HEALTHCARE | Age: 84
End: 2019-12-11
Payer: MEDICARE

## 2019-12-11 PROCEDURE — 665998 HH PPS REVENUE CREDIT

## 2019-12-11 PROCEDURE — 665999 HH PPS REVENUE DEBIT

## 2019-12-11 PROCEDURE — G0151 HHCP-SERV OF PT,EA 15 MIN: HCPCS

## 2019-12-12 VITALS
HEART RATE: 74 BPM | OXYGEN SATURATION: 96 % | TEMPERATURE: 97.5 F | RESPIRATION RATE: 18 BRPM | DIASTOLIC BLOOD PRESSURE: 68 MMHG | SYSTOLIC BLOOD PRESSURE: 130 MMHG

## 2019-12-12 PROCEDURE — 665999 HH PPS REVENUE DEBIT

## 2019-12-12 PROCEDURE — 665998 HH PPS REVENUE CREDIT

## 2019-12-12 ASSESSMENT — ACTIVITIES OF DAILY LIVING (ADL)
BATHING_COMMENTS: PLEASE REFER TO OT ASSESSMENT
ADLS_COMMENTS: PLEASE REFER TO OT ASSESSMENT
FEEDING_COMMENTS: PLEASE REFER TO OT ASSESSMENT
GROOMING_COMMENTS: PLEASE REFER TO OT ASSESSMENT

## 2019-12-13 ENCOUNTER — HOME CARE VISIT (OUTPATIENT)
Dept: HOME HEALTH SERVICES | Facility: HOME HEALTHCARE | Age: 84
End: 2019-12-13
Payer: MEDICARE

## 2019-12-13 VITALS
OXYGEN SATURATION: 97 % | SYSTOLIC BLOOD PRESSURE: 124 MMHG | HEART RATE: 69 BPM | TEMPERATURE: 97.4 F | DIASTOLIC BLOOD PRESSURE: 76 MMHG | RESPIRATION RATE: 20 BRPM

## 2019-12-13 PROCEDURE — G0493 RN CARE EA 15 MIN HH/HOSPICE: HCPCS

## 2019-12-13 PROCEDURE — 665999 HH PPS REVENUE DEBIT

## 2019-12-13 PROCEDURE — 665998 HH PPS REVENUE CREDIT

## 2019-12-13 ASSESSMENT — PATIENT HEALTH QUESTIONNAIRE - PHQ9: CLINICAL INTERPRETATION OF PHQ2 SCORE: 0

## 2019-12-13 ASSESSMENT — ACTIVITIES OF DAILY LIVING (ADL)
HOME_HEALTH_OASIS: 00
OASIS_M1830: 00

## 2019-12-14 PROCEDURE — 665998 HH PPS REVENUE CREDIT

## 2019-12-14 PROCEDURE — 665999 HH PPS REVENUE DEBIT

## 2019-12-15 PROCEDURE — 665999 HH PPS REVENUE DEBIT

## 2019-12-15 PROCEDURE — 665998 HH PPS REVENUE CREDIT

## 2019-12-16 PROCEDURE — 665998 HH PPS REVENUE CREDIT

## 2019-12-16 PROCEDURE — 665999 HH PPS REVENUE DEBIT

## 2019-12-17 PROCEDURE — 665998 HH PPS REVENUE CREDIT

## 2019-12-17 PROCEDURE — 665999 HH PPS REVENUE DEBIT

## 2019-12-18 PROCEDURE — 665999 HH PPS REVENUE DEBIT

## 2019-12-18 PROCEDURE — 665998 HH PPS REVENUE CREDIT

## 2019-12-18 ASSESSMENT — ACTIVITIES OF DAILY LIVING (ADL): OASIS_M1830: 03

## 2019-12-19 PROCEDURE — 665998 HH PPS REVENUE CREDIT

## 2019-12-19 PROCEDURE — 665999 HH PPS REVENUE DEBIT

## 2019-12-20 PROCEDURE — 665999 HH PPS REVENUE DEBIT

## 2019-12-20 PROCEDURE — 665998 HH PPS REVENUE CREDIT

## 2019-12-21 PROCEDURE — 665999 HH PPS REVENUE DEBIT

## 2019-12-21 PROCEDURE — 665998 HH PPS REVENUE CREDIT

## 2019-12-22 PROCEDURE — 665998 HH PPS REVENUE CREDIT

## 2019-12-22 PROCEDURE — 665999 HH PPS REVENUE DEBIT

## 2019-12-23 PROCEDURE — 665998 HH PPS REVENUE CREDIT

## 2019-12-23 PROCEDURE — 665999 HH PPS REVENUE DEBIT

## 2019-12-24 PROCEDURE — 665998 HH PPS REVENUE CREDIT

## 2019-12-24 PROCEDURE — 665999 HH PPS REVENUE DEBIT

## 2019-12-25 PROCEDURE — 665999 HH PPS REVENUE DEBIT

## 2019-12-25 PROCEDURE — 665998 HH PPS REVENUE CREDIT

## 2019-12-26 PROCEDURE — 665999 HH PPS REVENUE DEBIT

## 2019-12-26 PROCEDURE — 665998 HH PPS REVENUE CREDIT

## 2019-12-27 PROCEDURE — 665999 HH PPS REVENUE DEBIT

## 2019-12-27 PROCEDURE — 665998 HH PPS REVENUE CREDIT

## 2019-12-28 ENCOUNTER — HOSPITAL ENCOUNTER (OUTPATIENT)
Dept: RADIOLOGY | Facility: MEDICAL CENTER | Age: 84
End: 2019-12-28
Attending: NEUROLOGICAL SURGERY
Payer: MEDICARE

## 2019-12-28 DIAGNOSIS — S06.5X0D TRAUMATIC SUBDURAL HEMORRHAGE WITHOUT LOSS OF CONSCIOUSNESS, SUBSEQUENT ENCOUNTER: ICD-10-CM

## 2019-12-28 PROCEDURE — 665999 HH PPS REVENUE DEBIT

## 2019-12-28 PROCEDURE — 70450 CT HEAD/BRAIN W/O DYE: CPT

## 2019-12-28 PROCEDURE — 665998 HH PPS REVENUE CREDIT

## 2019-12-29 PROCEDURE — 665999 HH PPS REVENUE DEBIT

## 2019-12-29 PROCEDURE — 665998 HH PPS REVENUE CREDIT

## 2019-12-30 PROCEDURE — 665999 HH PPS REVENUE DEBIT

## 2019-12-30 PROCEDURE — 665998 HH PPS REVENUE CREDIT

## 2019-12-31 PROCEDURE — 665998 HH PPS REVENUE CREDIT

## 2019-12-31 PROCEDURE — 665999 HH PPS REVENUE DEBIT

## 2020-01-01 PROCEDURE — 665999 HH PPS REVENUE DEBIT

## 2020-01-01 PROCEDURE — 665998 HH PPS REVENUE CREDIT

## 2020-01-02 PROCEDURE — 665999 HH PPS REVENUE DEBIT

## 2020-01-02 PROCEDURE — 665998 HH PPS REVENUE CREDIT

## 2020-01-03 PROCEDURE — 665998 HH PPS REVENUE CREDIT

## 2020-01-03 PROCEDURE — 665999 HH PPS REVENUE DEBIT

## 2020-01-04 PROCEDURE — 665999 HH PPS REVENUE DEBIT

## 2020-01-04 PROCEDURE — 665998 HH PPS REVENUE CREDIT

## 2020-01-05 PROCEDURE — 665999 HH PPS REVENUE DEBIT

## 2020-01-05 PROCEDURE — 665998 HH PPS REVENUE CREDIT

## 2020-01-06 PROCEDURE — 665999 HH PPS REVENUE DEBIT

## 2020-01-06 PROCEDURE — 665998 HH PPS REVENUE CREDIT

## 2020-01-07 PROCEDURE — 665999 HH PPS REVENUE DEBIT

## 2020-01-07 PROCEDURE — 665998 HH PPS REVENUE CREDIT

## 2020-01-08 PROCEDURE — 665999 HH PPS REVENUE DEBIT

## 2020-01-08 PROCEDURE — 665998 HH PPS REVENUE CREDIT

## 2020-01-09 PROCEDURE — 665998 HH PPS REVENUE CREDIT

## 2020-01-09 PROCEDURE — 665999 HH PPS REVENUE DEBIT

## 2020-01-10 PROCEDURE — 665998 HH PPS REVENUE CREDIT

## 2020-01-10 PROCEDURE — 665999 HH PPS REVENUE DEBIT

## 2020-01-11 PROCEDURE — 665999 HH PPS REVENUE DEBIT

## 2020-01-11 PROCEDURE — 665998 HH PPS REVENUE CREDIT

## 2020-01-12 PROCEDURE — 665999 HH PPS REVENUE DEBIT

## 2020-01-12 PROCEDURE — 665998 HH PPS REVENUE CREDIT

## 2020-01-13 PROCEDURE — 665998 HH PPS REVENUE CREDIT

## 2020-01-13 PROCEDURE — 665999 HH PPS REVENUE DEBIT

## 2020-01-14 PROCEDURE — 665998 HH PPS REVENUE CREDIT

## 2020-01-14 PROCEDURE — 665999 HH PPS REVENUE DEBIT

## 2020-01-15 PROCEDURE — 665998 HH PPS REVENUE CREDIT

## 2020-01-15 PROCEDURE — 665999 HH PPS REVENUE DEBIT

## 2020-01-16 PROCEDURE — 665998 HH PPS REVENUE CREDIT

## 2020-01-16 PROCEDURE — 665999 HH PPS REVENUE DEBIT

## 2020-01-17 PROCEDURE — 665999 HH PPS REVENUE DEBIT

## 2020-01-17 PROCEDURE — 665998 HH PPS REVENUE CREDIT

## 2020-01-18 PROCEDURE — 665998 HH PPS REVENUE CREDIT

## 2020-01-18 PROCEDURE — 665999 HH PPS REVENUE DEBIT

## 2020-01-19 PROCEDURE — 665999 HH PPS REVENUE DEBIT

## 2020-01-19 PROCEDURE — 665998 HH PPS REVENUE CREDIT

## 2020-01-20 PROCEDURE — 665999 HH PPS REVENUE DEBIT

## 2020-01-20 PROCEDURE — 665998 HH PPS REVENUE CREDIT

## 2020-01-21 PROCEDURE — 665998 HH PPS REVENUE CREDIT

## 2020-01-21 PROCEDURE — 665999 HH PPS REVENUE DEBIT

## 2020-01-22 PROCEDURE — 665999 HH PPS REVENUE DEBIT

## 2020-01-22 PROCEDURE — 665998 HH PPS REVENUE CREDIT

## 2020-01-23 PROCEDURE — 665999 HH PPS REVENUE DEBIT

## 2020-01-23 PROCEDURE — 665998 HH PPS REVENUE CREDIT

## 2020-01-24 PROCEDURE — 665999 HH PPS REVENUE DEBIT

## 2020-01-24 PROCEDURE — 665998 HH PPS REVENUE CREDIT

## 2020-01-25 PROCEDURE — 665999 HH PPS REVENUE DEBIT

## 2020-01-25 PROCEDURE — 665998 HH PPS REVENUE CREDIT

## 2020-01-26 PROCEDURE — 665999 HH PPS REVENUE DEBIT

## 2020-01-26 PROCEDURE — 665998 HH PPS REVENUE CREDIT

## 2020-01-27 PROCEDURE — 665999 HH PPS REVENUE DEBIT

## 2020-01-27 PROCEDURE — 665998 HH PPS REVENUE CREDIT

## 2020-01-28 PROCEDURE — 665998 HH PPS REVENUE CREDIT

## 2020-01-28 PROCEDURE — 665999 HH PPS REVENUE DEBIT

## 2020-01-29 PROCEDURE — 665999 HH PPS REVENUE DEBIT

## 2020-01-29 PROCEDURE — 665998 HH PPS REVENUE CREDIT

## 2020-06-24 ENCOUNTER — OFFICE VISIT (OUTPATIENT)
Dept: URGENT CARE | Facility: PHYSICIAN GROUP | Age: 85
End: 2020-06-24
Payer: MEDICARE

## 2020-06-24 ENCOUNTER — HOSPITAL ENCOUNTER (EMERGENCY)
Facility: MEDICAL CENTER | Age: 85
End: 2020-06-24
Payer: MEDICARE

## 2020-06-24 VITALS
HEART RATE: 71 BPM | WEIGHT: 145 LBS | HEIGHT: 62 IN | BODY MASS INDEX: 26.68 KG/M2 | DIASTOLIC BLOOD PRESSURE: 84 MMHG | SYSTOLIC BLOOD PRESSURE: 150 MMHG | OXYGEN SATURATION: 96 % | TEMPERATURE: 98.2 F | RESPIRATION RATE: 20 BRPM

## 2020-06-24 VITALS
HEIGHT: 62 IN | SYSTOLIC BLOOD PRESSURE: 176 MMHG | HEART RATE: 76 BPM | DIASTOLIC BLOOD PRESSURE: 92 MMHG | TEMPERATURE: 98.4 F | RESPIRATION RATE: 18 BRPM | OXYGEN SATURATION: 95 % | WEIGHT: 146.61 LBS | BODY MASS INDEX: 26.98 KG/M2

## 2020-06-24 DIAGNOSIS — R51.9 NONINTRACTABLE EPISODIC HEADACHE, UNSPECIFIED HEADACHE TYPE: ICD-10-CM

## 2020-06-24 DIAGNOSIS — Z86.79 HISTORY OF SUBDURAL HEMATOMA: ICD-10-CM
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
## 2020-06-24 DIAGNOSIS — S77.01XA CRUSHING INJURY OF RIGHT HIP, INITIAL ENCOUNTER: ICD-10-CM

## 2020-06-24 PROCEDURE — 302449 STATCHG TRIAGE ONLY (STATISTIC)

## 2020-06-24 PROCEDURE — 99203 OFFICE O/P NEW LOW 30 MIN: CPT | Performed by: FAMILY MEDICINE

## 2020-06-24 ASSESSMENT — ENCOUNTER SYMPTOMS
SHORTNESS OF BREATH: 0
NAUSEA: 0
FOCAL WEAKNESS: 0
SENSORY CHANGE: 0
SORE THROAT: 0
HEADACHES: 1
MYALGIAS: 0
COUGH: 0
VOMITING: 0
FEVER: 0
CHILLS: 0
DIZZINESS: 1

## 2020-06-24 ASSESSMENT — PAIN SCALES - GENERAL: PAINLEVEL: 6=MODERATE PAIN

## 2020-06-24 ASSESSMENT — FIBROSIS 4 INDEX
FIB4 SCORE: 4.1
FIB4 SCORE: 4.1

## 2020-06-24 NOTE — ED TRIAGE NOTES
Amb to triage, sent by  for further eval.  Pt sustained a mechanical glf this afternoon, striking her R hip on her porch.  Reports feeling dizzy for a few minutes after the fall, this has resolved.  Denies hitting her head or any headache.  Pt has a large hematoma to R hip. Pt is not on thinners.  Pt reports  MD was also concerned for her elevated BP.  A&Ox4.

## 2020-06-24 NOTE — PROGRESS NOTES
Subjective:   Michelle Mckeon is a 84 y.o. female who presents for Fall (L leg brused, pt states she has a lump, dizziness, fell at 1pm )        84-year-old female with a history of a subdural hematoma, TIA presents to the urgent care with chief complaint of right hip pain.  The patient states she fell and struck the hip on the porch today at approximately 1300 p.m.  The patient complained of headache, the headache, ataxia, and dizziness after the fall and states it is possible that the headache and dizziness occurred directly prior to the fall.  Patient denies specific head injury.  The patient was able to ambulate after the fall yet with right hip pain and swelling which the patient states has decreased since the time of the injury.    Fall   Point of impact: Right hip. The pain is present in the right hip. Associated symptoms include headaches. Pertinent negatives include no fever, nausea or vomiting.     PMH:  has a past medical history of Disorder of thyroid and Fall.  MEDS:   Current Outpatient Medications:   •  levothyroxine (SYNTHROID) 75 MCG Tab, Take 75 mcg by mouth Every morning on an empty stomach. Take one tablet 5 days a week; T,W,F,S,S   Indications: Underactive Thyroid, Disp: , Rfl:   •  levothyroxine (SYNTHROID) 50 MCG Tab, Take 50 mcg by mouth Every morning on an empty stomach. Take 1 tab on Monday and Thursday  Indications: Underactive Thyroid, Disp: , Rfl:   •  atorvastatin (LIPITOR) 40 MG Tab, Take 1 Tab by mouth every evening. (Patient not taking: Reported on 6/24/2020), Disp: 30 Tab, Rfl: 0  ALLERGIES:   Allergies   Allergen Reactions   • Sulfa Drugs Itching     SURGHX:   Past Surgical History:   Procedure Laterality Date   • OTHER ORTHOPEDIC SURGERY      Blateral knee replacements     SOCHX:  reports that she quit smoking about 55 years ago. Her smoking use included cigarettes. She smoked 0.25 packs per day. She has never used smokeless tobacco. She reports current alcohol use of about  "4.2 oz of alcohol per week. She reports that she does not use drugs.  FH: No family history on file.  Review of Systems   Constitutional: Negative for chills and fever.   HENT: Negative for sore throat.    Respiratory: Negative for cough and shortness of breath.    Cardiovascular: Negative for chest pain.   Gastrointestinal: Negative for nausea and vomiting.   Genitourinary: Negative for dysuria.   Musculoskeletal: Negative for myalgias.   Skin: Negative for rash.   Neurological: Positive for dizziness and headaches. Negative for sensory change and focal weakness.        Objective:   /84   Pulse 71   Temp 36.8 °C (98.2 °F) (Temporal)   Resp 20   Ht 1.575 m (5' 2\")   Wt 65.8 kg (145 lb)   SpO2 96%   BMI 26.52 kg/m²   Physical Exam  Vitals signs and nursing note reviewed.   Constitutional:       General: She is not in acute distress.     Appearance: She is well-developed.   HENT:      Head: Normocephalic and atraumatic.      Right Ear: External ear normal.      Left Ear: External ear normal.      Nose: Nose normal.      Mouth/Throat:      Mouth: Mucous membranes are moist.   Eyes:      Conjunctiva/sclera: Conjunctivae normal.   Cardiovascular:      Rate and Rhythm: Normal rate.   Pulmonary:      Effort: Pulmonary effort is normal. No respiratory distress.      Breath sounds: Normal breath sounds.   Abdominal:      General: There is no distension.   Musculoskeletal: Normal range of motion.      Right hip: She exhibits tenderness, bony tenderness and swelling.   Skin:     General: Skin is warm and dry.   Neurological:      General: No focal deficit present.      Mental Status: She is alert and oriented to person, place, and time. Mental status is at baseline.      Gait: Abnormal gait: gait at baseline.   Psychiatric:         Judgment: Judgment normal.     Medical decision making: In the context of the patient's comorbidity with history of subdural hematoma and complains of headache and ataxia in relation to " a fall with hip injury today, emergency department evaluation and management is warranted.  The patient and daughter deferred EMS ambulance transfer and requested transport by private vehicle.  The [facility] emergency department was advised.      Assessment/Plan:   1. Fall, initial encounter    2. Nonintractable episodic headache, unspecified headache type    3. History of subdural hematoma    4. Crushing injury of right hip, initial encounter    Emergency department evaluation and management      Please note that this dictation was created using voice recognition software. I have worked with consultants from the vendor as well as technical experts from [facility] to optimize the interface. I have made every reasonable attempt to correct obvious errors, but I expect that there are errors of grammar and possibly content that I did not discover before finalizing the note.

## 2020-06-25 NOTE — ED NOTES
Patient to desk stating that she feels much better and does not understand why urgent care sent her here.  Patient signed ama, promising to return if sx worsen

## 2020-07-10 ENCOUNTER — HOSPITAL ENCOUNTER (OUTPATIENT)
Dept: LAB | Facility: MEDICAL CENTER | Age: 85
End: 2020-07-10
Attending: NURSE PRACTITIONER
Payer: MEDICARE

## 2020-07-10 LAB — T3FREE SERPL-MCNC: 2.45 PG/ML (ref 2–4.4)

## 2020-07-10 PROCEDURE — 36415 COLL VENOUS BLD VENIPUNCTURE: CPT

## 2020-07-10 PROCEDURE — 84481 FREE ASSAY (FT-3): CPT

## 2021-01-12 ENCOUNTER — APPOINTMENT (OUTPATIENT)
Dept: RADIOLOGY | Facility: IMAGING CENTER | Age: 86
End: 2021-01-12
Attending: PHYSICIAN ASSISTANT
Payer: MEDICARE

## 2021-01-12 ENCOUNTER — OFFICE VISIT (OUTPATIENT)
Dept: URGENT CARE | Facility: CLINIC | Age: 86
End: 2021-01-12
Payer: MEDICARE

## 2021-01-12 ENCOUNTER — HOSPITAL ENCOUNTER (OUTPATIENT)
Facility: MEDICAL CENTER | Age: 86
End: 2021-01-12
Attending: PHYSICIAN ASSISTANT
Payer: MEDICARE

## 2021-01-12 VITALS
BODY MASS INDEX: 27.97 KG/M2 | WEIGHT: 152 LBS | SYSTOLIC BLOOD PRESSURE: 150 MMHG | HEART RATE: 84 BPM | RESPIRATION RATE: 18 BRPM | OXYGEN SATURATION: 96 % | DIASTOLIC BLOOD PRESSURE: 96 MMHG | HEIGHT: 62 IN | TEMPERATURE: 98.3 F

## 2021-01-12 DIAGNOSIS — Z23 NEED FOR VACCINATION: ICD-10-CM

## 2021-01-12 DIAGNOSIS — J30.2 SEASONAL ALLERGIES: ICD-10-CM

## 2021-01-12 DIAGNOSIS — R01.1 HEART MURMUR, SYSTOLIC: ICD-10-CM

## 2021-01-12 DIAGNOSIS — R06.09 DYSPNEA ON EXERTION: ICD-10-CM

## 2021-01-12 LAB
BASOPHILS # BLD AUTO: 0.4 % (ref 0–1.8)
BASOPHILS # BLD: 0.03 K/UL (ref 0–0.12)
D DIMER PPP IA.FEU-MCNC: 0.43 UG/ML (FEU) (ref 0–0.5)
EOSINOPHIL # BLD AUTO: 0.09 K/UL (ref 0–0.51)
EOSINOPHIL NFR BLD: 1.2 % (ref 0–6.9)
ERYTHROCYTE [DISTWIDTH] IN BLOOD BY AUTOMATED COUNT: 51.1 FL (ref 35.9–50)
HCT VFR BLD AUTO: 46.4 % (ref 37–47)
HGB BLD-MCNC: 15.1 G/DL (ref 12–16)
IMM GRANULOCYTES # BLD AUTO: 0.13 K/UL (ref 0–0.11)
IMM GRANULOCYTES NFR BLD AUTO: 1.7 % (ref 0–0.9)
LYMPHOCYTES # BLD AUTO: 0.92 K/UL (ref 1–4.8)
LYMPHOCYTES NFR BLD: 11.8 % (ref 22–41)
MCH RBC QN AUTO: 30.1 PG (ref 27–33)
MCHC RBC AUTO-ENTMCNC: 32.5 G/DL (ref 33.6–35)
MCV RBC AUTO: 92.4 FL (ref 81.4–97.8)
MONOCYTES # BLD AUTO: 1.8 K/UL (ref 0–0.85)
MONOCYTES NFR BLD AUTO: 23.2 % (ref 0–13.4)
NEUTROPHILS # BLD AUTO: 4.8 K/UL (ref 2–7.15)
NEUTROPHILS NFR BLD: 61.7 % (ref 44–72)
NRBC # BLD AUTO: 0 K/UL
NRBC BLD-RTO: 0 /100 WBC
NT-PROBNP SERPL IA-MCNC: 310 PG/ML (ref 0–125)
PLATELET # BLD AUTO: 194 K/UL (ref 164–446)
PMV BLD AUTO: 13 FL (ref 9–12.9)
RBC # BLD AUTO: 5.02 M/UL (ref 4.2–5.4)
WBC # BLD AUTO: 7.8 K/UL (ref 4.8–10.8)

## 2021-01-12 PROCEDURE — 85025 COMPLETE CBC W/AUTO DIFF WBC: CPT

## 2021-01-12 PROCEDURE — 83880 ASSAY OF NATRIURETIC PEPTIDE: CPT

## 2021-01-12 PROCEDURE — 71046 X-RAY EXAM CHEST 2 VIEWS: CPT | Mod: TC,FY | Performed by: PHYSICIAN ASSISTANT

## 2021-01-12 PROCEDURE — 99214 OFFICE O/P EST MOD 30 MIN: CPT | Performed by: PHYSICIAN ASSISTANT

## 2021-01-12 PROCEDURE — 85379 FIBRIN DEGRADATION QUANT: CPT

## 2021-01-12 SDOH — HEALTH STABILITY: MENTAL HEALTH: HOW OFTEN DO YOU HAVE A DRINK CONTAINING ALCOHOL?: MONTHLY OR LESS

## 2021-01-12 ASSESSMENT — ENCOUNTER SYMPTOMS
EYE PAIN: 0
FATIGUE: 1
BRUISES/BLEEDS EASILY: 0
DIARRHEA: 0
ABDOMINAL PAIN: 0
COUGH: 1
SINUS PAIN: 0
MYALGIAS: 0
HEADACHES: 0
VOMITING: 0
FEVER: 0
DIZZINESS: 0
NAUSEA: 0
PALPITATIONS: 0
CHILLS: 0
SORE THROAT: 0
BLURRED VISION: 0
ORTHOPNEA: 0
SHORTNESS OF BREATH: 1

## 2021-01-12 ASSESSMENT — FIBROSIS 4 INDEX: FIB4 SCORE: 4.15

## 2021-01-13 ENCOUNTER — TELEPHONE (OUTPATIENT)
Dept: URGENT CARE | Facility: CLINIC | Age: 86
End: 2021-01-13

## 2021-01-13 NOTE — TELEPHONE ENCOUNTER
Called patient to discuss lab results.  D-dimer is within normal limits but BNP is elevated to 310.  Discussed that I am concerned that she may be having worsening valvular disease leading to early heart failure.  She will need to follow-up with cardiology in the next few weeks.  She was given the information for [facility] cardiology and it was recommended that she call them today to schedule the next available appointment.  If her symptoms worsen before she can see the cardiologist she should report directly to the emergency department for further evaluation.

## 2021-01-18 ENCOUNTER — TELEPHONE (OUTPATIENT)
Dept: CARDIOLOGY | Facility: MEDICAL CENTER | Age: 86
End: 2021-01-18

## 2021-01-18 NOTE — TELEPHONE ENCOUNTER
LVM for pt to confirm if this will be 1st time seeing a cardiologist.     Pt is fahad with [facility] on 1-19-21. Referring provider notes in pts chart.    Pending call back.

## 2021-01-19 ENCOUNTER — OFFICE VISIT (OUTPATIENT)
Dept: CARDIOLOGY | Facility: MEDICAL CENTER | Age: 86
End: 2021-01-19
Payer: MEDICARE

## 2021-01-19 VITALS
DIASTOLIC BLOOD PRESSURE: 90 MMHG | HEART RATE: 83 BPM | OXYGEN SATURATION: 97 % | SYSTOLIC BLOOD PRESSURE: 142 MMHG | RESPIRATION RATE: 12 BRPM | HEIGHT: 62 IN | WEIGHT: 153 LBS | BODY MASS INDEX: 28.16 KG/M2

## 2021-01-19 DIAGNOSIS — R94.31 NONSPECIFIC ABNORMAL ELECTROCARDIOGRAM (ECG) (EKG): ICD-10-CM

## 2021-01-19 DIAGNOSIS — R01.1 MURMUR, CARDIAC: ICD-10-CM

## 2021-01-19 DIAGNOSIS — R06.00 DYSPNEA, UNSPECIFIED TYPE: ICD-10-CM

## 2021-01-19 DIAGNOSIS — R06.09 DYSPNEA ON EXERTION: ICD-10-CM

## 2021-01-19 DIAGNOSIS — G45.9 TIA (TRANSIENT ISCHEMIC ATTACK): ICD-10-CM

## 2021-01-19 LAB — EKG IMPRESSION: NORMAL

## 2021-01-19 PROCEDURE — 93000 ELECTROCARDIOGRAM COMPLETE: CPT | Performed by: INTERNAL MEDICINE

## 2021-01-19 PROCEDURE — 99204 OFFICE O/P NEW MOD 45 MIN: CPT | Performed by: INTERNAL MEDICINE

## 2021-01-19 RX ORDER — LEVOTHYROXINE SODIUM 0.05 MG/1
50 TABLET ORAL
Qty: 30 TAB | Refills: 11 | Status: SHIPPED | OUTPATIENT
Start: 2021-01-19

## 2021-01-19 RX ORDER — LEVOTHYROXINE SODIUM 0.07 MG/1
75 TABLET ORAL
Qty: 30 TAB | Refills: 11 | Status: SHIPPED | OUTPATIENT
Start: 2021-01-19

## 2021-01-19 RX ORDER — IBUPROFEN 200 MG
200 TABLET ORAL DAILY
COMMUNITY
End: 2022-10-18

## 2021-01-19 ASSESSMENT — FIBROSIS 4 INDEX: FIB4 SCORE: 2.78

## 2021-01-19 NOTE — PROGRESS NOTES
Cardiology Initial Consultation Note    Date of note:    1/19/2021    Primary Care Provider: No primary care provider on file.  Referring Provider: Vanessa Oneill P.A*     Patient Name: Michelle Mckeon   YOB: 1935  MRN:              [id number]    Chief Complaint: Dyspnea on exertion    Michelle Mckeon is a 85 y.o. female  patient presented today with complaints of dyspnea on exertion has been going on for last 2 months.  She had a chest x-ray, proBNP was ordered which was slightly elevated, cardiology referral was made.  Denies chest pain.  She lives with her daughter.  She notices shortness of breath even taking the dog for walking which is unusual for her.  No swelling in feet, no fever, chills.    ROS    All other systems reviewed and discussed using a comprehensive questionnaire and are negative.     Past medical history, family history, social history, allergies and labs are reviewed and updated as needed as documented below.    Past Medical History:   Diagnosis Date   • Disorder of thyroid    • Fall          Past Surgical History:   Procedure Laterality Date   • OTHER ORTHOPEDIC SURGERY      Blateral knee replacements         Current Outpatient Medications   Medication Sig Dispense Refill   • Multiple Vitamins-Minerals (DAILY MULTI VITAMIN/MINERALS PO) Take  by mouth.     • ibuprofen (MOTRIN) 200 MG Tab Take 200 mg by mouth every 6 hours as needed.     • levothyroxine (SYNTHROID) 75 MCG Tab Take 1 Tab by mouth Every morning on an empty stomach. Take one tablet 5 days a week; T,W,F,S,S   Indications: Underactive Thyroid 30 Tab 11   • levothyroxine (SYNTHROID) 50 MCG Tab Take 1 Tab by mouth Every morning on an empty stomach. Take 1 tab on Monday and Thursday  Indications: Underactive Thyroid 30 Tab 11     No current facility-administered medications for this visit.          Allergies   Allergen Reactions   • Sulfa Drugs Itching         No family history on file.      Social  "History     Socioeconomic History   • Marital status:      Spouse name: Not on file   • Number of children: Not on file   • Years of education: Not on file   • Highest education level: Not on file   Occupational History   • Not on file   Social Needs   • Financial resource strain: Not on file   • Food insecurity     Worry: Never true     Inability: Never true   • Transportation needs     Medical: No     Non-medical: No   Tobacco Use   • Smoking status: Former Smoker     Packs/day: 0.25     Types: Cigarettes     Quit date: 1965     Years since quittin.0   • Smokeless tobacco: Never Used   Substance and Sexual Activity   • Alcohol use: Yes     Alcohol/week: 4.2 oz     Types: 7 Glasses of wine per week     Frequency: Monthly or less     Drinks per session: 1 or 2     Binge frequency: Never     Comment: pt has one glass of wine a night   • Drug use: Never   • Sexual activity: Not on file   Lifestyle   • Physical activity     Days per week: Not on file     Minutes per session: Not on file   • Stress: Not on file   Relationships   • Social connections     Talks on phone: Not on file     Gets together: Not on file     Attends Rastafarian service: Not on file     Active member of club or organization: Not on file     Attends meetings of clubs or organizations: Not on file     Relationship status: Not on file   • Intimate partner violence     Fear of current or ex partner: Not on file     Emotionally abused: Not on file     Physically abused: Not on file     Forced sexual activity: Not on file   Other Topics Concern   • Not on file   Social History Narrative   • Not on file         Physical Exam:  Ambulatory Vitals  /90 (BP Location: Left arm, Patient Position: Sitting, BP Cuff Size: Adult)   Pulse 83   Resp 12   Ht 1.575 m (5' 2\")   Wt 69.4 kg (153 lb)   SpO2 97%    Oxygen Therapy:  Pulse Oximetry: 97 %  BP Readings from Last 4 Encounters:   21 142/90   21 150/96   20 150/84 "   12/13/19 124/76       Weight/BMI: Body mass index is 27.98 kg/m².  Wt Readings from Last 4 Encounters:   01/19/21 69.4 kg (153 lb)   01/12/21 68.9 kg (152 lb)   06/24/20 65.8 kg (145 lb)   12/07/19 64.4 kg (142 lb)       General: Well appearing and in no apparent distress  Head: atrumatic  Eyes: No conjunctival pallor   ENT: normal external appearance of nose and ears  Neck: JVD absent, carotid bruits absent  Lungs: respiratory sounds  normal, additional breath sounds absent  Heart: Regular rhythm,   No palpable thrills on palpation, 3 x 6 systolic murmur aortic area, no rubs,   Lower extremity edema absent.   Pedal pulses normal  Abdomen: soft, non tender, non distended.  Extremities/MSK: no clubbing, no cyanosis  Neurological: normal orientation, Gait normal   Psychiatric: Appropriate affect, intact judgement and insight  Skin: Warm extremities      Lab Data Review:  Lab Results   Component Value Date/Time    CHOLSTRLTOT 219 (H) 11/30/2019 03:15 AM    LDL 88 11/30/2019 03:15 AM     11/30/2019 03:15 AM    TRIGLYCERIDE 63 11/30/2019 03:15 AM       Lab Results   Component Value Date/Time    SODIUM 142 12/04/2019 04:51 AM    POTASSIUM 3.9 12/04/2019 04:51 AM    CHLORIDE 109 12/04/2019 04:51 AM    CO2 21 12/04/2019 04:51 AM    GLUCOSE 119 (H) 12/04/2019 04:51 AM    BUN 13 12/04/2019 04:51 AM    CREATININE 0.87 12/04/2019 04:51 AM     Lab Results   Component Value Date/Time    ALKPHOSPHAT 61 11/29/2019 11:19 AM    ASTSGOT 17 11/29/2019 11:19 AM    ALTSGPT 7 11/29/2019 11:19 AM    TBILIRUBIN 1.0 11/29/2019 11:19 AM      Lab Results   Component Value Date/Time    WBC 7.8 01/12/2021 02:30 PM     EKG today shows sinus rhythm, possible RVH  [facility] from [facility] are reviewed.  Chest x-ray reviewed and independently interpreted, no pulmonary congestion    Echo 11/30/2019  Normal chamber sizes. Normal LV and RV systolic function. Indeterminate   diastolic function. There is moderate mitral annular calcification.   THere  is aortic sclerosis without significant stenosis. RVSP is 25 mmHg   estimated. No prior study is available for comparison.    Medical Decision Makin-year-old female patient with dyspnea on exertion, hypothyroidism, history of subdural hematoma, history of transient ischemic attack, history of cardiac murmur.  We will perform repeat echocardiogram to evaluate aortic sclerosis to stenosis.  We will also get a stress test to evaluate for coronary artery disease, dyspnea on exertion could be anginal equivalent.  She is not on aspirin because of history of subdural hematoma.  Further recommendations based on the test results.    Return in about 2 months (around 3/19/2021).    This note was dictated using Dragon speech recognition software.    Adolph MULTANI  Interventional cardiologist  [facility], [facility]  [address] [address]  Phone: [phone]  Fax: [phone]

## 2021-01-28 ENCOUNTER — APPOINTMENT (OUTPATIENT)
Dept: RADIOLOGY | Facility: MEDICAL CENTER | Age: 86
End: 2021-01-28
Attending: INTERNAL MEDICINE
Payer: MEDICARE

## 2021-02-05 ENCOUNTER — HOSPITAL ENCOUNTER (OUTPATIENT)
Dept: RADIOLOGY | Facility: MEDICAL CENTER | Age: 86
End: 2021-02-05
Attending: INTERNAL MEDICINE
Payer: MEDICARE

## 2021-02-05 DIAGNOSIS — R06.09 DYSPNEA ON EXERTION: ICD-10-CM

## 2021-02-05 DIAGNOSIS — R94.31 NONSPECIFIC ABNORMAL ELECTROCARDIOGRAM (ECG) (EKG): ICD-10-CM

## 2021-02-05 DIAGNOSIS — R01.1 MURMUR, CARDIAC: ICD-10-CM

## 2021-02-05 PROCEDURE — 78452 HT MUSCLE IMAGE SPECT MULT: CPT | Mod: 26 | Performed by: INTERNAL MEDICINE

## 2021-02-05 PROCEDURE — A9502 TC99M TETROFOSMIN: HCPCS

## 2021-02-05 PROCEDURE — 93018 CV STRESS TEST I&R ONLY: CPT | Performed by: INTERNAL MEDICINE

## 2021-02-05 RX ORDER — REGADENOSON 0.08 MG/ML
INJECTION, SOLUTION INTRAVENOUS
Status: DISPENSED
Start: 2021-02-05 | End: 2021-02-05

## 2021-02-16 ENCOUNTER — HOSPITAL ENCOUNTER (OUTPATIENT)
Dept: CARDIOLOGY | Facility: MEDICAL CENTER | Age: 86
End: 2021-02-16
Attending: INTERNAL MEDICINE
Payer: MEDICARE

## 2021-02-16 DIAGNOSIS — R06.09 DYSPNEA ON EXERTION: ICD-10-CM

## 2021-02-16 DIAGNOSIS — R01.1 MURMUR, CARDIAC: ICD-10-CM

## 2021-02-16 LAB
LV EJECT FRACT  99904: 70
LV EJECT FRACT MOD 2C 99903: 67.42
LV EJECT FRACT MOD 4C 99902: 63.32
LV EJECT FRACT MOD BP 99901: 65.98

## 2021-02-16 PROCEDURE — 93306 TTE W/DOPPLER COMPLETE: CPT

## 2021-02-16 PROCEDURE — 93306 TTE W/DOPPLER COMPLETE: CPT | Mod: 26 | Performed by: INTERNAL MEDICINE

## 2021-03-23 ENCOUNTER — OFFICE VISIT (OUTPATIENT)
Dept: CARDIOLOGY | Facility: MEDICAL CENTER | Age: 86
End: 2021-03-23
Payer: MEDICARE

## 2021-03-23 VITALS
SYSTOLIC BLOOD PRESSURE: 144 MMHG | WEIGHT: 157.2 LBS | OXYGEN SATURATION: 97 % | HEIGHT: 62 IN | BODY MASS INDEX: 28.93 KG/M2 | DIASTOLIC BLOOD PRESSURE: 78 MMHG | RESPIRATION RATE: 14 BRPM | HEART RATE: 86 BPM

## 2021-03-23 DIAGNOSIS — I35.0 MILD AORTIC STENOSIS: ICD-10-CM

## 2021-03-23 DIAGNOSIS — R06.02 SHORTNESS OF BREATH: ICD-10-CM

## 2021-03-23 DIAGNOSIS — I05.0 MILD MITRAL STENOSIS: ICD-10-CM

## 2021-03-23 PROCEDURE — 99213 OFFICE O/P EST LOW 20 MIN: CPT | Performed by: INTERNAL MEDICINE

## 2021-03-23 ASSESSMENT — FIBROSIS 4 INDEX: FIB4 SCORE: 2.78

## 2021-03-23 NOTE — PROGRESS NOTES
Cardiology Follow-up Consultation Note    Date of note:    3/23/2021    Primary Care Provider: No primary care provider on file.    Name:             Michelle Mckeon   YOB: 1935  MRN:               [id number]    CC: Follow-up dyspnea on exertion    Patient ID/HPI:   85-year-old female patient here for follow-up dyspnea on exertion, reviewed the test results of echocardiogram, nuclear stress test for 2 months follow-up.  She states that her dyspnea on exertion, fatigue improved.  No chest pain.  She feels great currently, back to baseline.  She has a new primary care physician, has follow-up set up in a month    ROS  All other systems reviewed and negative.    Past Medical History:   Diagnosis Date   • Disorder of thyroid    • Fall          Past Surgical History:   Procedure Laterality Date   • OTHER ORTHOPEDIC SURGERY      Blateral knee replacements         Current Outpatient Medications   Medication Sig Dispense Refill   • Multiple Vitamins-Minerals (DAILY MULTI VITAMIN/MINERALS PO) Take  by mouth.     • ibuprofen (MOTRIN) 200 MG Tab Take 200 mg by mouth every day.     • levothyroxine (SYNTHROID) 75 MCG Tab Take 1 Tab by mouth Every morning on an empty stomach. Take one tablet 5 days a week; T,W,F,S,S   Indications: Underactive Thyroid 30 Tab 11   • levothyroxine (SYNTHROID) 50 MCG Tab Take 1 Tab by mouth Every morning on an empty stomach. Take 1 tab on Monday and Thursday  Indications: Underactive Thyroid 30 Tab 11     No current facility-administered medications for this visit.         Allergies   Allergen Reactions   • Sulfa Drugs Itching         History reviewed. No pertinent family history.      Social History     Socioeconomic History   • Marital status:      Spouse name: Not on file   • Number of children: Not on file   • Years of education: Not on file   • Highest education level: Not on file   Occupational History   • Not on file   Tobacco Use   • Smoking status: Former  "Smoker     Packs/day: 0.25     Types: Cigarettes     Quit date: 1965     Years since quittin.2   • Smokeless tobacco: Never Used   Substance and Sexual Activity   • Alcohol use: Yes     Alcohol/week: 4.2 oz     Types: 7 Glasses of wine per week     Comment: pt has one glass of wine a night   • Drug use: Never   • Sexual activity: Not on file   Other Topics Concern   • Not on file   Social History Narrative   • Not on file     Social Determinants of Health     Financial Resource Strain:    • Difficulty of Paying Living Expenses:    Food Insecurity:    • Worried About Running Out of Food in the Last Year:    • Ran Out of Food in the Last Year:    Transportation Needs:    • Lack of Transportation (Medical):    • Lack of Transportation (Non-Medical):    Physical Activity:    • Days of Exercise per Week:    • Minutes of Exercise per Session:    Stress:    • Feeling of Stress :    Social Connections:    • Frequency of Communication with Friends and Family:    • Frequency of Social Gatherings with Friends and Family:    • Attends Evangelical Services:    • Active Member of Clubs or Organizations:    • Attends Club or Organization Meetings:    • Marital Status:    Intimate Partner Violence:    • Fear of Current or Ex-Partner:    • Emotionally Abused:    • Physically Abused:    • Sexually Abused:          Physical Exam:  Ambulatory Vitals  /78 (BP Location: Left arm, Patient Position: Sitting, BP Cuff Size: Adult)   Pulse 86   Resp 14   Ht 1.575 m (5' 2\")   Wt 71.3 kg (157 lb 3.2 oz)   SpO2 97%    Oxygen Therapy:  Pulse Oximetry: 97 %  BP Readings from Last 4 Encounters:   21 144/78   21 142/90   21 150/96   20 150/84       Weight/BMI: Body mass index is 28.75 kg/m².  Wt Readings from Last 4 Encounters:   21 71.3 kg (157 lb 3.2 oz)   21 69.4 kg (153 lb)   21 68.9 kg (152 lb)   20 65.8 kg (145 lb)       General: Well appearing and in no apparent distress  Head: " atrumatic  Eyes: No conjunctival pallor   ENT: normal external appearance of nose and ears  Neck: JVD absent, carotid bruits absent  Lungs: respiratory sounds  normal, additional breath sounds absent  Heart: Regular rhythm,   No palpable thrills on palpation, 2 x 6 systolic murmur aortic area, no rubs,   Lower extremity edema absent.   Pedal pulses normal  Abdomen: soft, non tender, non distended.  Extremities/MSK: no clubbing, no cyanosis  Neurological: normal orientation, Gait normal   Psychiatric: Appropriate affect, intact judgement and insight  Skin: Warm extremities      Lab Data Review:  Lab Results   Component Value Date/Time    CHOLSTRLTOT 219 (H) 11/30/2019 03:15 AM    LDL 88 11/30/2019 03:15 AM     11/30/2019 03:15 AM    TRIGLYCERIDE 63 11/30/2019 03:15 AM       Lab Results   Component Value Date/Time    SODIUM 142 12/04/2019 04:51 AM    POTASSIUM 3.9 12/04/2019 04:51 AM    CHLORIDE 109 12/04/2019 04:51 AM    CO2 21 12/04/2019 04:51 AM    GLUCOSE 119 (H) 12/04/2019 04:51 AM    BUN 13 12/04/2019 04:51 AM    CREATININE 0.87 12/04/2019 04:51 AM     Lab Results   Component Value Date/Time    ALKPHOSPHAT 61 11/29/2019 11:19 AM    ASTSGOT 17 11/29/2019 11:19 AM    ALTSGPT 7 11/29/2019 11:19 AM    TBILIRUBIN 1.0 11/29/2019 11:19 AM      Lab Results   Component Value Date/Time    WBC 7.8 01/12/2021 02:30 PM     NM stress 2/5/21:   NUCLEAR IMAGING INTERPRETATION   No evidence of significant jeopardized viable myocardium or prior myocardial    infarction.   Normal left ventricular size, ejection fraction, and wall motion.   ECG INTERPRETATION   No ischemic changes    Echo 2/16/21:  CONCLUSIONS  Prior echo on 11/30/19.  Left ventricular ejection fraction is visually estimated to be 70%.  Grade II diastolic dysfunction.  Mild aortic stenosis. Vmax is 2.3 m/s.  Mild mitral stenosis. Mild mitral regurgitation.      Impression and Plan:  85-year-old female patient with history of subdural hematoma,  hypothyroidism, dyspnea on exertion, mild aortic stenosis, mild mitral stenosis.  Her dyspnea on exertion improved.  Cardiac work-up is negative for ischemia, echocardiogram showed mild valvular disease.  For her valvular disease would recommend follow-up in clinic every year, echocardiograms every 3 years.  Her LDL was 88.   Blood pressure is on the higher side today, recommend taking blood pressures regularly, send me a message in few days with readings.    Return in about 1 year (around 3/23/2022).      Adolph MULTANI  Interventional cardiologist  [facility], [facility]  [address] [address]  Phone: [phone]  Fax: [phone]

## 2021-03-24 ENCOUNTER — IMMUNIZATION (OUTPATIENT)
Dept: FAMILY PLANNING/WOMEN'S HEALTH CLINIC | Facility: IMMUNIZATION CENTER | Age: 86
End: 2021-03-24
Attending: INTERNAL MEDICINE
Payer: MEDICARE

## 2021-03-24 DIAGNOSIS — Z23 ENCOUNTER FOR VACCINATION: Primary | ICD-10-CM

## 2021-03-24 DIAGNOSIS — Z23 NEED FOR VACCINATION: ICD-10-CM

## 2021-03-24 PROCEDURE — 0001A PFIZER SARS-COV-2 VACCINE: CPT

## 2021-03-24 PROCEDURE — 91300 PFIZER SARS-COV-2 VACCINE: CPT

## 2021-04-15 ENCOUNTER — IMMUNIZATION (OUTPATIENT)
Dept: FAMILY PLANNING/WOMEN'S HEALTH CLINIC | Facility: IMMUNIZATION CENTER | Age: 86
End: 2021-04-15
Attending: INTERNAL MEDICINE
Payer: MEDICARE

## 2021-04-15 DIAGNOSIS — Z23 ENCOUNTER FOR VACCINATION: Primary | ICD-10-CM

## 2021-04-15 PROCEDURE — 91300 PFIZER SARS-COV-2 VACCINE: CPT

## 2021-04-15 PROCEDURE — 0002A PFIZER SARS-COV-2 VACCINE: CPT

## 2021-04-21 ENCOUNTER — HOSPITAL ENCOUNTER (OUTPATIENT)
Dept: LAB | Facility: MEDICAL CENTER | Age: 86
End: 2021-04-21
Attending: FAMILY MEDICINE
Payer: MEDICARE

## 2021-04-21 LAB
BASOPHILS # BLD AUTO: 0.7 % (ref 0–1.8)
BASOPHILS # BLD: 0.04 K/UL (ref 0–0.12)
EOSINOPHIL # BLD AUTO: 0.13 K/UL (ref 0–0.51)
EOSINOPHIL NFR BLD: 2.2 % (ref 0–6.9)
ERYTHROCYTE [DISTWIDTH] IN BLOOD BY AUTOMATED COUNT: 49.8 FL (ref 35.9–50)
EST. AVERAGE GLUCOSE BLD GHB EST-MCNC: 114 MG/DL
HBA1C MFR BLD: 5.6 % (ref 4–5.6)
HCT VFR BLD AUTO: 43 % (ref 37–47)
HGB BLD-MCNC: 14.8 G/DL (ref 12–16)
IMM GRANULOCYTES # BLD AUTO: 0.11 K/UL (ref 0–0.11)
IMM GRANULOCYTES NFR BLD AUTO: 1.9 % (ref 0–0.9)
LYMPHOCYTES # BLD AUTO: 0.78 K/UL (ref 1–4.8)
LYMPHOCYTES NFR BLD: 13.2 % (ref 22–41)
MCH RBC QN AUTO: 30.2 PG (ref 27–33)
MCHC RBC AUTO-ENTMCNC: 34.4 G/DL (ref 33.6–35)
MCV RBC AUTO: 87.8 FL (ref 81.4–97.8)
MONOCYTES # BLD AUTO: 1.18 K/UL (ref 0–0.85)
MONOCYTES NFR BLD AUTO: 19.9 % (ref 0–13.4)
NEUTROPHILS # BLD AUTO: 3.68 K/UL (ref 2–7.15)
NEUTROPHILS NFR BLD: 62.1 % (ref 44–72)
NRBC # BLD AUTO: 0 K/UL
NRBC BLD-RTO: 0 /100 WBC
PLATELET # BLD AUTO: 165 K/UL (ref 164–446)
PMV BLD AUTO: 11.9 FL (ref 9–12.9)
RBC # BLD AUTO: 4.9 M/UL (ref 4.2–5.4)
WBC # BLD AUTO: 5.9 K/UL (ref 4.8–10.8)

## 2021-04-21 PROCEDURE — 84436 ASSAY OF TOTAL THYROXINE: CPT

## 2021-04-21 PROCEDURE — 36415 COLL VENOUS BLD VENIPUNCTURE: CPT

## 2021-04-21 PROCEDURE — 83036 HEMOGLOBIN GLYCOSYLATED A1C: CPT | Mod: GA

## 2021-04-21 PROCEDURE — 84443 ASSAY THYROID STIM HORMONE: CPT

## 2021-04-21 PROCEDURE — 85025 COMPLETE CBC W/AUTO DIFF WBC: CPT

## 2021-04-21 PROCEDURE — 80061 LIPID PANEL: CPT

## 2021-04-21 PROCEDURE — 80053 COMPREHEN METABOLIC PANEL: CPT

## 2021-04-21 PROCEDURE — 82306 VITAMIN D 25 HYDROXY: CPT

## 2021-04-22 LAB
25(OH)D3 SERPL-MCNC: 14 NG/ML (ref 30–80)
ALBUMIN SERPL BCP-MCNC: 4.7 G/DL (ref 3.2–4.9)
ALBUMIN/GLOB SERPL: 2.5 G/DL
ALP SERPL-CCNC: 71 U/L (ref 30–99)
ALT SERPL-CCNC: 13 U/L (ref 2–50)
ANION GAP SERPL CALC-SCNC: 10 MMOL/L (ref 7–16)
AST SERPL-CCNC: 20 U/L (ref 12–45)
BILIRUB SERPL-MCNC: 0.7 MG/DL (ref 0.1–1.5)
BUN SERPL-MCNC: 23 MG/DL (ref 8–22)
CALCIUM SERPL-MCNC: 9.4 MG/DL (ref 8.5–10.5)
CHLORIDE SERPL-SCNC: 103 MMOL/L (ref 96–112)
CHOLEST SERPL-MCNC: 244 MG/DL (ref 100–199)
CO2 SERPL-SCNC: 25 MMOL/L (ref 20–33)
CREAT SERPL-MCNC: 0.95 MG/DL (ref 0.5–1.4)
FASTING STATUS PATIENT QL REPORTED: NORMAL
GLOBULIN SER CALC-MCNC: 1.9 G/DL (ref 1.9–3.5)
GLUCOSE SERPL-MCNC: 107 MG/DL (ref 65–99)
HDLC SERPL-MCNC: 144 MG/DL
LDLC SERPL CALC-MCNC: 91 MG/DL
POTASSIUM SERPL-SCNC: 4.4 MMOL/L (ref 3.6–5.5)
PROT SERPL-MCNC: 6.6 G/DL (ref 6–8.2)
SODIUM SERPL-SCNC: 138 MMOL/L (ref 135–145)
T4 SERPL-MCNC: 7.2 UG/DL (ref 4–12)
TRIGL SERPL-MCNC: 46 MG/DL (ref 0–149)
TSH SERPL DL<=0.005 MIU/L-ACNC: 1.87 UIU/ML (ref 0.38–5.33)

## 2021-06-07 NOTE — PROGRESS NOTES
"Virginia Hospital Specific Disposition - REQUIRED: [facility] Specific Patient Instructions  COVID 19 Nurse Triage Plan/Patient Instructions    Please be aware that novel coronavirus (COVID-19) may be circulating in the community. If you develop symptoms such as fever, cough, or SOB or if you have concerns about the presence of another infection including coronavirus (COVID-19), please contact your health care provider or visit [email or web address].       Patient to have an OnCare Visit with a provider (Preferred option). Follow System Ambulatory Workflow for COVID 19. It is recommended that you setup an OnCare Visit with one of our virtual providers.  To do this follow these instructions:    1. Go to the website [email or web address]  2. Create an account (you will need your insurance information)  3. Start a new visit  4. Choose your diagnosis (e.g. COVID19)  5. Fill out the information about your symptoms  6. A provider will reach out to you by text, phone call or video visit based on your request    Call Back If: Your symptoms worsen before you are able to complete your OnCare " Subjective:      Michelle Mckeon is a 85 y.o. female who presents with Fatigue (x 2 months, watery eyes, tested Neg for Covid19 about 2 months ago), Runny Nose, and Cough    HPI:  This is a new problem. Michelle Mckeon is a 85 y.o. female who presents today for evaluation of fatigue, shortness of breath, and intermittent itchy/watery eyes and runny nose.  Patient reports that she has been having the symptoms intermittently for the past 2 months.  She says occasionally she gets itchy/watery eyes and increased nasal congestion/runny nose with mild dry cough.  She also reports that she has been getting easily fatigued and gets very short of breath with exertion.  She says that it is waxing and waning but the bad days are starting to outnumber the good days.  She denies any history of asthma, COPD, or heart disease.  She has not started taking any medications.  She states that 2 months ago when symptoms started she was tested for COVID-19 virus which came back negative.  She has not had any fever/chills, chest pain, or nausea/vomiting/diarrhea.  No leg swelling or recent travel.  No history of DVT or PE.  She is not on blood thinners.      Review of Systems   Constitutional: Positive for fatigue and malaise/fatigue. Negative for chills and fever.   HENT: Positive for congestion. Negative for ear pain, sinus pain and sore throat.    Eyes: Negative for blurred vision and pain.        Intermittent itchy/watery eyes   Respiratory: Positive for cough and shortness of breath.    Cardiovascular: Negative for chest pain, palpitations, orthopnea and leg swelling.   Gastrointestinal: Negative for abdominal pain, diarrhea, nausea and vomiting.   Musculoskeletal: Negative for myalgias.   Skin: Negative for rash.   Neurological: Negative for dizziness and headaches.   Endo/Heme/Allergies: Does not bruise/bleed easily.       PMH:  has a past medical history of Disorder of thyroid and Fall.  MEDS:   Current Outpatient  "Medications:   •  levothyroxine (SYNTHROID) 75 MCG Tab, Take 75 mcg by mouth Every morning on an empty stomach. Take one tablet 5 days a week; T,W,F,S,S   Indications: Underactive Thyroid, Disp: , Rfl:   •  levothyroxine (SYNTHROID) 50 MCG Tab, Take 50 mcg by mouth Every morning on an empty stomach. Take 1 tab on Monday and Thursday  Indications: Underactive Thyroid, Disp: , Rfl:   •  atorvastatin (LIPITOR) 40 MG Tab, Take 1 Tab by mouth every evening. (Patient not taking: Reported on 6/24/2020), Disp: 30 Tab, Rfl: 0  ALLERGIES:   Allergies   Allergen Reactions   • Sulfa Drugs Itching     SURGHX:   Past Surgical History:   Procedure Laterality Date   • OTHER ORTHOPEDIC SURGERY      Blateral knee replacements     SOCHX:  reports that she quit smoking about 56 years ago. Her smoking use included cigarettes. She smoked 0.25 packs per day. She has never used smokeless tobacco. She reports current alcohol use of about 4.2 oz of alcohol per week. She reports that she does not use drugs.  FH: Family history was reviewed, no pertinent findings to report     Objective:     /96   Pulse 84   Temp 36.8 °C (98.3 °F) (Temporal)   Resp 18   Ht 1.575 m (5' 2\")   Wt 68.9 kg (152 lb)   SpO2 96%   BMI 27.80 kg/m²      Physical Exam  Constitutional:       Appearance: She is well-developed.   HENT:      Head: Normocephalic and atraumatic.      Right Ear: External ear normal.      Left Ear: External ear normal.   Eyes:      Conjunctiva/sclera: Conjunctivae normal.      Pupils: Pupils are equal, round, and reactive to light.   Neck:      Musculoskeletal: Normal range of motion.   Cardiovascular:      Rate and Rhythm: Normal rate and regular rhythm.      Heart sounds: Normal heart sounds. No murmur.   Pulmonary:      Effort: Pulmonary effort is normal.      Breath sounds: Normal breath sounds. No decreased breath sounds, wheezing, rhonchi or rales.   Musculoskeletal:      Right lower leg: No edema.      Left lower leg: No " Visit with a provider.    Thank you for limiting contact with others, wearing a simple mask to cover your cough, practice good hand hygiene habits and accessing our virtual services where possible to limit the spread of this virus.    For more information about COVID19 and options for caring for yourself at home, please visit the CDC website at https://www.cdc.gov/coronavirus/2019-ncov/about/steps-when-sick.html  For more options for care at [facility], please visit our website at [email or web address]    For more information, please use the [place] Department of Health ([facility]) COVID-19 Hotlines (Interpreters available):   Health questions: Phone Number: [phone] or 1-348.539.6603 and Hours: 7 a.m. to 7 p.m.  Schools and  questions: Phone Number: [phone] or [phone] and Hours 7 a.m. to 7 p.m.    Protocols used: CORONAVIRUS (COVID-19) DIAGNOSED OR [id number] 3.30.20       edema.   Lymphadenopathy:      Cervical: No cervical adenopathy.   Skin:     General: Skin is warm and dry.      Capillary Refill: Capillary refill takes less than 2 seconds.   Neurological:      Mental Status: She is alert and oriented to person, place, and time.   Psychiatric:         Behavior: Behavior normal.         Judgment: Judgment normal.         DX-CHEST-2 VIEWS  IMPRESSION:     1. No active cardiopulmonary abnormalities are identified.          *X-rays were reviewed and interpreted independently by me. I agree with the radiologist's findings     Assessment/Plan:     1. Dyspnea on exertion  - DX-CHEST-2 VIEWS; Future  - D-DIMER; Future  - proBrain Natriuretic Peptide, NT; Future  - CBC WITH DIFFERENTIAL; Future  - REFERRAL TO CARDIOLOGY    2. Heart murmur, systolic  - REFERRAL TO CARDIOLOGY    3. Seasonal allergies  -The symptoms of intermittent itchy/watery eyes and nasal congestion seem consistent with seasonal allergies.  She was advised to try taking an OTC Zyrtec or Claritin when she has these symptoms.        The shortness of breath that is worse with exertion and deep breathing is concerning for something pulmonary or cardiac related.  She does have a systolic heart murmur for which she states she has not seen cardiology in many years.  I am concerned that there might be worsening valvular disease contributing to some of her symptoms.  We'll obtain a D-dimer, BNP, and CBC to further evaluate an urgent referral to cardiology was also placed.  Strict ER precautions were discussed in the interim.  Patient was also advised that if any of the lab values are abnormal she may be referred to the ER for more immediate evaluation and work-up.              Differential Diagnosis, natural history, and supportive care discussed. Return to the Urgent Care or follow up with your PCP if symptoms fail to resolve, or for any new or worsening symptoms. Emergency room precautions discussed. Patient and/or family  appears understanding of information.

## 2022-07-20 ENCOUNTER — HOSPITAL ENCOUNTER (OUTPATIENT)
Dept: LAB | Facility: MEDICAL CENTER | Age: 87
End: 2022-07-20
Attending: FAMILY MEDICINE
Payer: MEDICARE

## 2022-07-20 LAB
25(OH)D3 SERPL-MCNC: 69 NG/ML (ref 30–100)
ALBUMIN SERPL BCP-MCNC: 4.9 G/DL (ref 3.2–4.9)
ALBUMIN/GLOB SERPL: 2.6 G/DL
ALP SERPL-CCNC: 60 U/L (ref 30–99)
ALT SERPL-CCNC: 11 U/L (ref 2–50)
ANION GAP SERPL CALC-SCNC: 13 MMOL/L (ref 7–16)
AST SERPL-CCNC: 14 U/L (ref 12–45)
BASOPHILS # BLD AUTO: 0.3 % (ref 0–1.8)
BASOPHILS # BLD: 0.03 K/UL (ref 0–0.12)
BILIRUB SERPL-MCNC: 0.7 MG/DL (ref 0.1–1.5)
BUN SERPL-MCNC: 28 MG/DL (ref 8–22)
CALCIUM SERPL-MCNC: 10 MG/DL (ref 8.5–10.5)
CHLORIDE SERPL-SCNC: 100 MMOL/L (ref 96–112)
CHOLEST SERPL-MCNC: 214 MG/DL (ref 100–199)
CO2 SERPL-SCNC: 25 MMOL/L (ref 20–33)
CREAT SERPL-MCNC: 1.18 MG/DL (ref 0.5–1.4)
CREAT UR-MCNC: 36.11 MG/DL
EOSINOPHIL # BLD AUTO: 0.11 K/UL (ref 0–0.51)
EOSINOPHIL NFR BLD: 1.3 % (ref 0–6.9)
ERYTHROCYTE [DISTWIDTH] IN BLOOD BY AUTOMATED COUNT: 53.5 FL (ref 35.9–50)
EST. AVERAGE GLUCOSE BLD GHB EST-MCNC: 120 MG/DL
FASTING STATUS PATIENT QL REPORTED: NORMAL
GFR SERPLBLD CREATININE-BSD FMLA CKD-EPI: 45 ML/MIN/1.73 M 2
GLOBULIN SER CALC-MCNC: 1.9 G/DL (ref 1.9–3.5)
GLUCOSE SERPL-MCNC: 99 MG/DL (ref 65–99)
HBA1C MFR BLD: 5.8 % (ref 4–5.6)
HCT VFR BLD AUTO: 46.6 % (ref 37–47)
HDLC SERPL-MCNC: 93 MG/DL
HGB BLD-MCNC: 15.1 G/DL (ref 12–16)
IMM GRANULOCYTES # BLD AUTO: 0.19 K/UL (ref 0–0.11)
IMM GRANULOCYTES NFR BLD AUTO: 2.2 % (ref 0–0.9)
LDLC SERPL CALC-MCNC: 109 MG/DL
LYMPHOCYTES # BLD AUTO: 1.23 K/UL (ref 1–4.8)
LYMPHOCYTES NFR BLD: 14 % (ref 22–41)
MCH RBC QN AUTO: 28.4 PG (ref 27–33)
MCHC RBC AUTO-ENTMCNC: 32.4 G/DL (ref 33.6–35)
MCV RBC AUTO: 87.8 FL (ref 81.4–97.8)
MICROALBUMIN UR-MCNC: <1.2 MG/DL
MICROALBUMIN/CREAT UR: NORMAL MG/G (ref 0–30)
MONOCYTES # BLD AUTO: 1.56 K/UL (ref 0–0.85)
MONOCYTES NFR BLD AUTO: 17.7 % (ref 0–13.4)
NEUTROPHILS # BLD AUTO: 5.68 K/UL (ref 2–7.15)
NEUTROPHILS NFR BLD: 64.5 % (ref 44–72)
NRBC # BLD AUTO: 0 K/UL
NRBC BLD-RTO: 0 /100 WBC
PLATELET # BLD AUTO: 258 K/UL (ref 164–446)
PMV BLD AUTO: 11.8 FL (ref 9–12.9)
POTASSIUM SERPL-SCNC: 4.4 MMOL/L (ref 3.6–5.5)
PROT SERPL-MCNC: 6.8 G/DL (ref 6–8.2)
RBC # BLD AUTO: 5.31 M/UL (ref 4.2–5.4)
SODIUM SERPL-SCNC: 138 MMOL/L (ref 135–145)
TRIGL SERPL-MCNC: 62 MG/DL (ref 0–149)
TSH SERPL DL<=0.005 MIU/L-ACNC: 1.04 UIU/ML (ref 0.38–5.33)
WBC # BLD AUTO: 8.8 K/UL (ref 4.8–10.8)

## 2022-07-20 PROCEDURE — 85025 COMPLETE CBC W/AUTO DIFF WBC: CPT

## 2022-07-20 PROCEDURE — 82570 ASSAY OF URINE CREATININE: CPT

## 2022-07-20 PROCEDURE — 36415 COLL VENOUS BLD VENIPUNCTURE: CPT

## 2022-07-20 PROCEDURE — 82043 UR ALBUMIN QUANTITATIVE: CPT

## 2022-07-20 PROCEDURE — 80061 LIPID PANEL: CPT

## 2022-07-20 PROCEDURE — 82306 VITAMIN D 25 HYDROXY: CPT | Mod: GA

## 2022-07-20 PROCEDURE — 84443 ASSAY THYROID STIM HORMONE: CPT

## 2022-07-20 PROCEDURE — 80053 COMPREHEN METABOLIC PANEL: CPT

## 2022-07-20 PROCEDURE — 83036 HEMOGLOBIN GLYCOSYLATED A1C: CPT | Mod: GA

## 2022-09-27 PROBLEM — M43.16 SPONDYLOLISTHESIS, LUMBAR REGION: Status: ACTIVE | Noted: 2022-09-27

## 2022-09-27 PROBLEM — M48.062 NEUROGENIC CLAUDICATION DUE TO LUMBAR SPINAL STENOSIS: Status: ACTIVE | Noted: 2022-09-27

## 2022-10-06 ENCOUNTER — HOSPITAL ENCOUNTER (OUTPATIENT)
Dept: LAB | Facility: MEDICAL CENTER | Age: 87
End: 2022-10-06
Attending: FAMILY MEDICINE
Payer: MEDICARE

## 2022-10-06 LAB
25(OH)D3 SERPL-MCNC: 60 NG/ML (ref 30–100)
ALBUMIN SERPL BCP-MCNC: 4.6 G/DL (ref 3.2–4.9)
ALBUMIN/GLOB SERPL: 1.7 G/DL
ALP SERPL-CCNC: 62 U/L (ref 30–99)
ALT SERPL-CCNC: 10 U/L (ref 2–50)
ANION GAP SERPL CALC-SCNC: 9 MMOL/L (ref 7–16)
AST SERPL-CCNC: 18 U/L (ref 12–45)
BASOPHILS # BLD AUTO: 0.8 % (ref 0–1.8)
BASOPHILS # BLD: 0.07 K/UL (ref 0–0.12)
BILIRUB SERPL-MCNC: 0.6 MG/DL (ref 0.1–1.5)
BUN SERPL-MCNC: 25 MG/DL (ref 8–22)
CALCIUM SERPL-MCNC: 9.7 MG/DL (ref 8.5–10.5)
CHLORIDE SERPL-SCNC: 100 MMOL/L (ref 96–112)
CHOLEST SERPL-MCNC: 234 MG/DL (ref 100–199)
CO2 SERPL-SCNC: 27 MMOL/L (ref 20–33)
CREAT SERPL-MCNC: 1.16 MG/DL (ref 0.5–1.4)
CREAT UR-MCNC: 32.15 MG/DL
EOSINOPHIL # BLD AUTO: 0.15 K/UL (ref 0–0.51)
EOSINOPHIL NFR BLD: 1.8 % (ref 0–6.9)
ERYTHROCYTE [DISTWIDTH] IN BLOOD BY AUTOMATED COUNT: 53.2 FL (ref 35.9–50)
EST. AVERAGE GLUCOSE BLD GHB EST-MCNC: 123 MG/DL
FASTING STATUS PATIENT QL REPORTED: NORMAL
GFR SERPLBLD CREATININE-BSD FMLA CKD-EPI: 46 ML/MIN/1.73 M 2
GLOBULIN SER CALC-MCNC: 2.7 G/DL (ref 1.9–3.5)
GLUCOSE SERPL-MCNC: 101 MG/DL (ref 65–99)
HBA1C MFR BLD: 5.9 % (ref 4–5.6)
HCT VFR BLD AUTO: 46.4 % (ref 37–47)
HDLC SERPL-MCNC: 133 MG/DL
HGB BLD-MCNC: 15.6 G/DL (ref 12–16)
IMM GRANULOCYTES # BLD AUTO: 0.35 K/UL (ref 0–0.11)
IMM GRANULOCYTES NFR BLD AUTO: 4.1 % (ref 0–0.9)
LDLC SERPL CALC-MCNC: 89 MG/DL
LYMPHOCYTES # BLD AUTO: 1.3 K/UL (ref 1–4.8)
LYMPHOCYTES NFR BLD: 15.3 % (ref 22–41)
MCH RBC QN AUTO: 29.1 PG (ref 27–33)
MCHC RBC AUTO-ENTMCNC: 33.6 G/DL (ref 33.6–35)
MCV RBC AUTO: 86.6 FL (ref 81.4–97.8)
MICROALBUMIN UR-MCNC: <1.2 MG/DL
MICROALBUMIN/CREAT UR: NORMAL MG/G (ref 0–30)
MONOCYTES # BLD AUTO: 1.39 K/UL (ref 0–0.85)
MONOCYTES NFR BLD AUTO: 16.3 % (ref 0–13.4)
NEUTROPHILS # BLD AUTO: 5.26 K/UL (ref 2–7.15)
NEUTROPHILS NFR BLD: 61.7 % (ref 44–72)
NRBC # BLD AUTO: 0 K/UL
NRBC BLD-RTO: 0 /100 WBC
PLATELET # BLD AUTO: 331 K/UL (ref 164–446)
PMV BLD AUTO: 10.6 FL (ref 9–12.9)
POTASSIUM SERPL-SCNC: 4.3 MMOL/L (ref 3.6–5.5)
PROT SERPL-MCNC: 7.3 G/DL (ref 6–8.2)
RBC # BLD AUTO: 5.36 M/UL (ref 4.2–5.4)
SODIUM SERPL-SCNC: 136 MMOL/L (ref 135–145)
T4 SERPL-MCNC: 7.4 UG/DL (ref 4–12)
TRIGL SERPL-MCNC: 61 MG/DL (ref 0–149)
TSH SERPL DL<=0.005 MIU/L-ACNC: 1.3 UIU/ML (ref 0.38–5.33)
WBC # BLD AUTO: 8.5 K/UL (ref 4.8–10.8)

## 2022-10-06 PROCEDURE — 84436 ASSAY OF TOTAL THYROXINE: CPT

## 2022-10-06 PROCEDURE — 85025 COMPLETE CBC W/AUTO DIFF WBC: CPT

## 2022-10-06 PROCEDURE — 82043 UR ALBUMIN QUANTITATIVE: CPT

## 2022-10-06 PROCEDURE — 83036 HEMOGLOBIN GLYCOSYLATED A1C: CPT | Mod: GA

## 2022-10-06 PROCEDURE — 36415 COLL VENOUS BLD VENIPUNCTURE: CPT

## 2022-10-06 PROCEDURE — 82306 VITAMIN D 25 HYDROXY: CPT

## 2022-10-06 PROCEDURE — 80053 COMPREHEN METABOLIC PANEL: CPT

## 2022-10-06 PROCEDURE — 82570 ASSAY OF URINE CREATININE: CPT

## 2022-10-06 PROCEDURE — 80061 LIPID PANEL: CPT

## 2022-10-06 PROCEDURE — 84443 ASSAY THYROID STIM HORMONE: CPT

## 2022-10-18 ENCOUNTER — HOSPITAL ENCOUNTER (OUTPATIENT)
Dept: RADIOLOGY | Facility: MEDICAL CENTER | Age: 87
DRG: 455 | End: 2022-10-18
Attending: NEUROLOGICAL SURGERY | Admitting: NEUROLOGICAL SURGERY
Payer: MEDICARE

## 2022-10-18 ENCOUNTER — PRE-ADMISSION TESTING (OUTPATIENT)
Dept: ADMISSIONS | Facility: MEDICAL CENTER | Age: 87
DRG: 455 | End: 2022-10-18
Attending: NEUROLOGICAL SURGERY
Payer: MEDICARE

## 2022-10-18 DIAGNOSIS — M54.50 LOW BACK PAIN, UNSPECIFIED BACK PAIN LATERALITY, UNSPECIFIED CHRONICITY, UNSPECIFIED WHETHER SCIATICA PRESENT: ICD-10-CM

## 2022-10-18 LAB
25(OH)D3 SERPL-MCNC: 54 NG/ML (ref 30–100)
ABO GROUP BLD: NORMAL
ANION GAP SERPL CALC-SCNC: 12 MMOL/L (ref 7–16)
APPEARANCE UR: CLEAR
APTT PPP: 30.9 SEC (ref 24.7–36)
BACTERIA #/AREA URNS HPF: NEGATIVE /HPF
BASOPHILS # BLD AUTO: 0.4 % (ref 0–1.8)
BASOPHILS # BLD: 0.04 K/UL (ref 0–0.12)
BILIRUB UR QL STRIP.AUTO: NEGATIVE
BLD GP AB SCN SERPL QL: NORMAL
BUN SERPL-MCNC: 25 MG/DL (ref 8–22)
CALCIUM SERPL-MCNC: 9.5 MG/DL (ref 8.5–10.5)
CHLORIDE SERPL-SCNC: 98 MMOL/L (ref 96–112)
CO2 SERPL-SCNC: 25 MMOL/L (ref 20–33)
COLOR UR: YELLOW
CREAT SERPL-MCNC: 1.05 MG/DL (ref 0.5–1.4)
EKG IMPRESSION: NORMAL
EOSINOPHIL # BLD AUTO: 0.13 K/UL (ref 0–0.51)
EOSINOPHIL NFR BLD: 1.3 % (ref 0–6.9)
EPI CELLS #/AREA URNS HPF: NEGATIVE /HPF
ERYTHROCYTE [DISTWIDTH] IN BLOOD BY AUTOMATED COUNT: 51.6 FL (ref 35.9–50)
EST. AVERAGE GLUCOSE BLD GHB EST-MCNC: 114 MG/DL
GFR SERPLBLD CREATININE-BSD FMLA CKD-EPI: 52 ML/MIN/1.73 M 2
GLUCOSE SERPL-MCNC: 118 MG/DL (ref 65–99)
GLUCOSE UR STRIP.AUTO-MCNC: NEGATIVE MG/DL
HBA1C MFR BLD: 5.6 % (ref 4–5.6)
HCT VFR BLD AUTO: 45.6 % (ref 37–47)
HGB BLD-MCNC: 15.2 G/DL (ref 12–16)
HYALINE CASTS #/AREA URNS LPF: NORMAL /LPF
IMM GRANULOCYTES # BLD AUTO: 0.43 K/UL (ref 0–0.11)
IMM GRANULOCYTES NFR BLD AUTO: 4.3 % (ref 0–0.9)
INR PPP: 1.02 (ref 0.87–1.13)
KETONES UR STRIP.AUTO-MCNC: NEGATIVE MG/DL
LEUKOCYTE ESTERASE UR QL STRIP.AUTO: ABNORMAL
LYMPHOCYTES # BLD AUTO: 1.18 K/UL (ref 1–4.8)
LYMPHOCYTES NFR BLD: 11.8 % (ref 22–41)
MCH RBC QN AUTO: 28.4 PG (ref 27–33)
MCHC RBC AUTO-ENTMCNC: 33.3 G/DL (ref 33.6–35)
MCV RBC AUTO: 85.2 FL (ref 81.4–97.8)
MICRO URNS: ABNORMAL
MONOCYTES # BLD AUTO: 1.39 K/UL (ref 0–0.85)
MONOCYTES NFR BLD AUTO: 13.9 % (ref 0–13.4)
NEUTROPHILS # BLD AUTO: 6.83 K/UL (ref 2–7.15)
NEUTROPHILS NFR BLD: 68.3 % (ref 44–72)
NITRITE UR QL STRIP.AUTO: NEGATIVE
NRBC # BLD AUTO: 0 K/UL
NRBC BLD-RTO: 0 /100 WBC
PH UR STRIP.AUTO: 7 [PH] (ref 5–8)
PLATELET # BLD AUTO: 336 K/UL (ref 164–446)
PMV BLD AUTO: 9.8 FL (ref 9–12.9)
POTASSIUM SERPL-SCNC: 4.4 MMOL/L (ref 3.6–5.5)
PROT UR QL STRIP: NEGATIVE MG/DL
PROTHROMBIN TIME: 13.3 SEC (ref 12–14.6)
RBC # BLD AUTO: 5.35 M/UL (ref 4.2–5.4)
RBC # URNS HPF: NORMAL /HPF
RBC UR QL AUTO: NEGATIVE
RH BLD: NORMAL
SODIUM SERPL-SCNC: 135 MMOL/L (ref 135–145)
SP GR UR STRIP.AUTO: 1.01
UROBILINOGEN UR STRIP.AUTO-MCNC: 0.2 MG/DL
WBC # BLD AUTO: 10 K/UL (ref 4.8–10.8)
WBC #/AREA URNS HPF: NORMAL /HPF

## 2022-10-18 PROCEDURE — 86900 BLOOD TYPING SEROLOGIC ABO: CPT

## 2022-10-18 PROCEDURE — 82306 VITAMIN D 25 HYDROXY: CPT

## 2022-10-18 PROCEDURE — 85610 PROTHROMBIN TIME: CPT

## 2022-10-18 PROCEDURE — 93010 ELECTROCARDIOGRAM REPORT: CPT | Performed by: INTERNAL MEDICINE

## 2022-10-18 PROCEDURE — 86850 RBC ANTIBODY SCREEN: CPT

## 2022-10-18 PROCEDURE — 80048 BASIC METABOLIC PNL TOTAL CA: CPT

## 2022-10-18 PROCEDURE — 93005 ELECTROCARDIOGRAM TRACING: CPT

## 2022-10-18 PROCEDURE — 85025 COMPLETE CBC W/AUTO DIFF WBC: CPT

## 2022-10-18 PROCEDURE — 71046 X-RAY EXAM CHEST 2 VIEWS: CPT

## 2022-10-18 PROCEDURE — 86901 BLOOD TYPING SEROLOGIC RH(D): CPT

## 2022-10-18 PROCEDURE — 85730 THROMBOPLASTIN TIME PARTIAL: CPT

## 2022-10-18 PROCEDURE — 36415 COLL VENOUS BLD VENIPUNCTURE: CPT

## 2022-10-18 PROCEDURE — 83036 HEMOGLOBIN GLYCOSYLATED A1C: CPT

## 2022-10-18 PROCEDURE — 81001 URINALYSIS AUTO W/SCOPE: CPT

## 2022-10-18 RX ORDER — ACETAMINOPHEN 500 MG
1000 TABLET ORAL EVERY 6 HOURS PRN
COMMUNITY
End: 2022-11-24

## 2022-10-18 ASSESSMENT — FIBROSIS 4 INDEX: FIB4 SCORE: 1.46

## 2022-10-31 ENCOUNTER — ANESTHESIA EVENT (OUTPATIENT)
Dept: SURGERY | Facility: MEDICAL CENTER | Age: 87
DRG: 455 | End: 2022-10-31
Payer: MEDICARE

## 2022-10-31 ENCOUNTER — ANESTHESIA (OUTPATIENT)
Dept: SURGERY | Facility: MEDICAL CENTER | Age: 87
DRG: 455 | End: 2022-10-31
Payer: MEDICARE

## 2022-10-31 ENCOUNTER — APPOINTMENT (OUTPATIENT)
Dept: RADIOLOGY | Facility: MEDICAL CENTER | Age: 87
DRG: 455 | End: 2022-10-31
Attending: NEUROLOGICAL SURGERY
Payer: MEDICARE

## 2022-10-31 ENCOUNTER — HOSPITAL ENCOUNTER (INPATIENT)
Facility: MEDICAL CENTER | Age: 87
LOS: 3 days | DRG: 455 | End: 2022-11-03
Attending: NEUROLOGICAL SURGERY | Admitting: NEUROLOGICAL SURGERY
Payer: MEDICARE

## 2022-10-31 DIAGNOSIS — M48.062 LUMBAR STENOSIS WITH NEUROGENIC CLAUDICATION: ICD-10-CM

## 2022-10-31 DIAGNOSIS — G89.18 POSTOPERATIVE PAIN AFTER SPINAL SURGERY: ICD-10-CM

## 2022-10-31 DIAGNOSIS — M43.16 SPONDYLOLISTHESIS, LUMBAR REGION: ICD-10-CM

## 2022-10-31 DIAGNOSIS — M48.062 NEUROGENIC CLAUDICATION DUE TO LUMBAR SPINAL STENOSIS: ICD-10-CM

## 2022-10-31 PROCEDURE — A4306 DRUG DELIVERY SYSTEM <=50 ML: HCPCS | Performed by: NEUROLOGICAL SURGERY

## 2022-10-31 PROCEDURE — 22634 ARTHRD CMBN 1NTRSPC EA ADDL: CPT | Performed by: NEUROLOGICAL SURGERY

## 2022-10-31 PROCEDURE — A9270 NON-COVERED ITEM OR SERVICE: HCPCS | Performed by: PHYSICIAN ASSISTANT

## 2022-10-31 PROCEDURE — 700101 HCHG RX REV CODE 250: Performed by: PHYSICIAN ASSISTANT

## 2022-10-31 PROCEDURE — 22634 ARTHRD CMBN 1NTRSPC EA ADDL: CPT | Mod: ASROC | Performed by: PHYSICIAN ASSISTANT

## 2022-10-31 PROCEDURE — 0SG10AJ FUSION OF 2 OR MORE LUMBAR VERTEBRAL JOINTS WITH INTERBODY FUSION DEVICE, POSTERIOR APPROACH, ANTERIOR COLUMN, OPEN APPROACH: ICD-10-PCS | Performed by: NEUROLOGICAL SURGERY

## 2022-10-31 PROCEDURE — 63052 LAM FACETC/FRMT ARTHRD LUM 1: CPT | Performed by: NEUROLOGICAL SURGERY

## 2022-10-31 PROCEDURE — C1713 ANCHOR/SCREW BN/BN,TIS/BN: HCPCS | Performed by: NEUROLOGICAL SURGERY

## 2022-10-31 PROCEDURE — 72100 X-RAY EXAM L-S SPINE 2/3 VWS: CPT

## 2022-10-31 PROCEDURE — 700111 HCHG RX REV CODE 636 W/ 250 OVERRIDE (IP): Performed by: ANESTHESIOLOGY

## 2022-10-31 PROCEDURE — 110371 HCHG SHELL REV 272: Performed by: NEUROLOGICAL SURGERY

## 2022-10-31 PROCEDURE — 160035 HCHG PACU - 1ST 60 MINS PHASE I: Performed by: NEUROLOGICAL SURGERY

## 2022-10-31 PROCEDURE — 22842 INSERT SPINE FIXATION DEVICE: CPT | Mod: ASROC | Performed by: PHYSICIAN ASSISTANT

## 2022-10-31 PROCEDURE — 700105 HCHG RX REV CODE 258: Performed by: NEUROLOGICAL SURGERY

## 2022-10-31 PROCEDURE — 22853 INSJ BIOMECHANICAL DEVICE: CPT | Mod: ASROC | Performed by: PHYSICIAN ASSISTANT

## 2022-10-31 PROCEDURE — 700111 HCHG RX REV CODE 636 W/ 250 OVERRIDE (IP): Performed by: PHYSICIAN ASSISTANT

## 2022-10-31 PROCEDURE — 160002 HCHG RECOVERY MINUTES (STAT): Performed by: NEUROLOGICAL SURGERY

## 2022-10-31 PROCEDURE — 0QS00ZZ REPOSITION LUMBAR VERTEBRA, OPEN APPROACH: ICD-10-PCS | Performed by: NEUROLOGICAL SURGERY

## 2022-10-31 PROCEDURE — 95937 NEUROMUSCULAR JUNCTION TEST: CPT | Performed by: NEUROLOGICAL SURGERY

## 2022-10-31 PROCEDURE — 22633 ARTHRD CMBN 1NTRSPC LUMBAR: CPT | Performed by: NEUROLOGICAL SURGERY

## 2022-10-31 PROCEDURE — 160036 HCHG PACU - EA ADDL 30 MINS PHASE I: Performed by: NEUROLOGICAL SURGERY

## 2022-10-31 PROCEDURE — 3E0U3GB INTRODUCTION OF RECOMBINANT BONE MORPHOGENETIC PROTEIN INTO JOINTS, PERCUTANEOUS APPROACH: ICD-10-PCS | Performed by: NEUROLOGICAL SURGERY

## 2022-10-31 PROCEDURE — 700105 HCHG RX REV CODE 258: Performed by: PHYSICIAN ASSISTANT

## 2022-10-31 PROCEDURE — 01NB0ZZ RELEASE LUMBAR NERVE, OPEN APPROACH: ICD-10-PCS | Performed by: NEUROLOGICAL SURGERY

## 2022-10-31 PROCEDURE — 00630 ANES PX LUMBAR REGION NOS: CPT | Performed by: ANESTHESIOLOGY

## 2022-10-31 PROCEDURE — 95861 NEEDLE EMG 2 EXTREMITIES: CPT | Performed by: NEUROLOGICAL SURGERY

## 2022-10-31 PROCEDURE — 20930 SP BONE ALGRFT MORSEL ADD-ON: CPT | Mod: ASROC | Performed by: PHYSICIAN ASSISTANT

## 2022-10-31 PROCEDURE — 63053 LAM FACTC/FRMT ARTHRD LUM EA: CPT | Mod: ASROC | Performed by: PHYSICIAN ASSISTANT

## 2022-10-31 PROCEDURE — 22633 ARTHRD CMBN 1NTRSPC LUMBAR: CPT | Mod: ASROC | Performed by: PHYSICIAN ASSISTANT

## 2022-10-31 PROCEDURE — 700111 HCHG RX REV CODE 636 W/ 250 OVERRIDE (IP): Performed by: NEUROLOGICAL SURGERY

## 2022-10-31 PROCEDURE — 700101 HCHG RX REV CODE 250: Performed by: NEUROLOGICAL SURGERY

## 2022-10-31 PROCEDURE — 700102 HCHG RX REV CODE 250 W/ 637 OVERRIDE(OP): Performed by: PHYSICIAN ASSISTANT

## 2022-10-31 PROCEDURE — 20936 SP BONE AGRFT LOCAL ADD-ON: CPT | Performed by: NEUROLOGICAL SURGERY

## 2022-10-31 PROCEDURE — 700101 HCHG RX REV CODE 250: Performed by: ANESTHESIOLOGY

## 2022-10-31 PROCEDURE — 700106 HCHG RX REV CODE 271: Performed by: NEUROLOGICAL SURGERY

## 2022-10-31 PROCEDURE — 502000 HCHG MISC OR IMPLANTS RC 0278: Performed by: NEUROLOGICAL SURGERY

## 2022-10-31 PROCEDURE — 20936 SP BONE AGRFT LOCAL ADD-ON: CPT | Mod: ASROC | Performed by: PHYSICIAN ASSISTANT

## 2022-10-31 PROCEDURE — A9270 NON-COVERED ITEM OR SERVICE: HCPCS | Performed by: ANESTHESIOLOGY

## 2022-10-31 PROCEDURE — 160009 HCHG ANES TIME/MIN: Performed by: NEUROLOGICAL SURGERY

## 2022-10-31 PROCEDURE — 63053 LAM FACTC/FRMT ARTHRD LUM EA: CPT | Performed by: NEUROLOGICAL SURGERY

## 2022-10-31 PROCEDURE — 160042 HCHG SURGERY MINUTES - EA ADDL 1 MIN LEVEL 5: Performed by: NEUROLOGICAL SURGERY

## 2022-10-31 PROCEDURE — 95938 SOMATOSENSORY TESTING: CPT | Performed by: NEUROLOGICAL SURGERY

## 2022-10-31 PROCEDURE — 99100 ANES PT EXTEME AGE<1 YR&>70: CPT | Performed by: ANESTHESIOLOGY

## 2022-10-31 PROCEDURE — 160048 HCHG OR STATISTICAL LEVEL 1-5: Performed by: NEUROLOGICAL SURGERY

## 2022-10-31 PROCEDURE — 0SG0071 FUSION OF LUMBAR VERTEBRAL JOINT WITH AUTOLOGOUS TISSUE SUBSTITUTE, POSTERIOR APPROACH, POSTERIOR COLUMN, OPEN APPROACH: ICD-10-PCS | Performed by: NEUROLOGICAL SURGERY

## 2022-10-31 PROCEDURE — 20930 SP BONE ALGRFT MORSEL ADD-ON: CPT | Performed by: NEUROLOGICAL SURGERY

## 2022-10-31 PROCEDURE — 770001 HCHG ROOM/CARE - MED/SURG/GYN PRIV*

## 2022-10-31 PROCEDURE — 22853 INSJ BIOMECHANICAL DEVICE: CPT | Performed by: NEUROLOGICAL SURGERY

## 2022-10-31 PROCEDURE — 8E0WXBZ COMPUTER ASSISTED PROCEDURE OF TRUNK REGION: ICD-10-PCS | Performed by: NEUROLOGICAL SURGERY

## 2022-10-31 PROCEDURE — 63052 LAM FACETC/FRMT ARTHRD LUM 1: CPT | Mod: ASROC | Performed by: PHYSICIAN ASSISTANT

## 2022-10-31 PROCEDURE — 160031 HCHG SURGERY MINUTES - 1ST 30 MINS LEVEL 5: Performed by: NEUROLOGICAL SURGERY

## 2022-10-31 PROCEDURE — 700102 HCHG RX REV CODE 250 W/ 637 OVERRIDE(OP): Performed by: ANESTHESIOLOGY

## 2022-10-31 PROCEDURE — 95940 IONM IN OPERATNG ROOM 15 MIN: CPT | Performed by: NEUROLOGICAL SURGERY

## 2022-10-31 PROCEDURE — 22842 INSERT SPINE FIXATION DEVICE: CPT | Performed by: NEUROLOGICAL SURGERY

## 2022-10-31 DEVICE — IMPLANTABLE DEVICE: Type: IMPLANTABLE DEVICE | Site: SPINE LUMBAR | Status: FUNCTIONAL

## 2022-10-31 DEVICE — GRAPH BONE KIT INFUSE LARGE II: Type: IMPLANTABLE DEVICE | Site: SPINE LUMBAR | Status: FUNCTIONAL

## 2022-10-31 DEVICE — SCREW MAS  SOLERA 6.5 X 50MM (1TCX16+3TCX8=40): Type: IMPLANTABLE DEVICE | Site: SPINE LUMBAR | Status: FUNCTIONAL

## 2022-10-31 DEVICE — SCREW SOLERA SET SCREW (1TCX40+3TCX21+2TCX10=123): Type: IMPLANTABLE DEVICE | Site: SPINE LUMBAR | Status: FUNCTIONAL

## 2022-10-31 DEVICE — ROD PREBENT TITANIUM 5.5 X 70MM (2TCONX2=4): Type: IMPLANTABLE DEVICE | Site: SPINE LUMBAR | Status: FUNCTIONAL

## 2022-10-31 DEVICE — MATRIX MASTERGRAFT 20CC: Type: IMPLANTABLE DEVICE | Site: SPINE LUMBAR | Status: FUNCTIONAL

## 2022-10-31 RX ORDER — ACETAMINOPHEN 500 MG
1000 TABLET ORAL EVERY 6 HOURS PRN
Status: DISCONTINUED | OUTPATIENT
Start: 2022-11-05 | End: 2022-11-03 | Stop reason: HOSPADM

## 2022-10-31 RX ORDER — SODIUM CHLORIDE 9 MG/ML
500 INJECTION, SOLUTION INTRAVENOUS ONCE
Status: COMPLETED | OUTPATIENT
Start: 2022-10-31 | End: 2022-10-31

## 2022-10-31 RX ORDER — DIPHENHYDRAMINE HYDROCHLORIDE 50 MG/ML
25 INJECTION INTRAMUSCULAR; INTRAVENOUS EVERY 6 HOURS PRN
Status: DISCONTINUED | OUTPATIENT
Start: 2022-10-31 | End: 2022-11-03 | Stop reason: HOSPADM

## 2022-10-31 RX ORDER — DEXAMETHASONE SODIUM PHOSPHATE 4 MG/ML
INJECTION, SOLUTION INTRA-ARTICULAR; INTRALESIONAL; INTRAMUSCULAR; INTRAVENOUS; SOFT TISSUE PRN
Status: DISCONTINUED | OUTPATIENT
Start: 2022-10-31 | End: 2022-10-31 | Stop reason: SURG

## 2022-10-31 RX ORDER — BUPIVACAINE HYDROCHLORIDE AND EPINEPHRINE 5; 5 MG/ML; UG/ML
INJECTION, SOLUTION EPIDURAL; INTRACAUDAL; PERINEURAL
Status: DISCONTINUED | OUTPATIENT
Start: 2022-10-31 | End: 2022-10-31 | Stop reason: HOSPADM

## 2022-10-31 RX ORDER — ONDANSETRON 2 MG/ML
4 INJECTION INTRAMUSCULAR; INTRAVENOUS EVERY 4 HOURS PRN
Status: DISCONTINUED | OUTPATIENT
Start: 2022-10-31 | End: 2022-11-03 | Stop reason: HOSPADM

## 2022-10-31 RX ORDER — CEFAZOLIN SODIUM 1 G/3ML
2 INJECTION, POWDER, FOR SOLUTION INTRAMUSCULAR; INTRAVENOUS ONCE
Status: COMPLETED | OUTPATIENT
Start: 2022-10-31 | End: 2022-10-31

## 2022-10-31 RX ORDER — HALOPERIDOL 5 MG/ML
1 INJECTION INTRAMUSCULAR
Status: DISCONTINUED | OUTPATIENT
Start: 2022-10-31 | End: 2022-10-31 | Stop reason: HOSPADM

## 2022-10-31 RX ORDER — LISINOPRIL 20 MG/1
20 TABLET ORAL
Status: DISCONTINUED | OUTPATIENT
Start: 2022-10-31 | End: 2022-11-03 | Stop reason: HOSPADM

## 2022-10-31 RX ORDER — BISACODYL 10 MG
10 SUPPOSITORY, RECTAL RECTAL
Status: DISCONTINUED | OUTPATIENT
Start: 2022-10-31 | End: 2022-11-03 | Stop reason: HOSPADM

## 2022-10-31 RX ORDER — ALPRAZOLAM 0.25 MG/1
0.25 TABLET ORAL 2 TIMES DAILY PRN
Status: DISCONTINUED | OUTPATIENT
Start: 2022-10-31 | End: 2022-11-03 | Stop reason: HOSPADM

## 2022-10-31 RX ORDER — DIPHENHYDRAMINE HCL 25 MG
25 TABLET ORAL EVERY 6 HOURS PRN
Status: DISCONTINUED | OUTPATIENT
Start: 2022-10-31 | End: 2022-11-03 | Stop reason: HOSPADM

## 2022-10-31 RX ORDER — ONDANSETRON 2 MG/ML
INJECTION INTRAMUSCULAR; INTRAVENOUS PRN
Status: DISCONTINUED | OUTPATIENT
Start: 2022-10-31 | End: 2022-10-31 | Stop reason: SURG

## 2022-10-31 RX ORDER — LEVOTHYROXINE SODIUM 0.07 MG/1
75 TABLET ORAL
Status: DISCONTINUED | OUTPATIENT
Start: 2022-11-01 | End: 2022-11-03 | Stop reason: HOSPADM

## 2022-10-31 RX ORDER — HYDROCHLOROTHIAZIDE 12.5 MG/1
12.5 TABLET ORAL
Status: DISCONTINUED | OUTPATIENT
Start: 2022-10-31 | End: 2022-11-03 | Stop reason: HOSPADM

## 2022-10-31 RX ORDER — LEVOTHYROXINE SODIUM 0.05 MG/1
50 TABLET ORAL
Status: DISCONTINUED | OUTPATIENT
Start: 2022-11-03 | End: 2022-11-03 | Stop reason: HOSPADM

## 2022-10-31 RX ORDER — SODIUM CHLORIDE, SODIUM LACTATE, POTASSIUM CHLORIDE, AND CALCIUM CHLORIDE .6; .31; .03; .02 G/100ML; G/100ML; G/100ML; G/100ML
IRRIGANT IRRIGATION
Status: DISCONTINUED | OUTPATIENT
Start: 2022-10-31 | End: 2022-10-31 | Stop reason: HOSPADM

## 2022-10-31 RX ORDER — HYDROMORPHONE HYDROCHLORIDE 1 MG/ML
0.1 INJECTION, SOLUTION INTRAMUSCULAR; INTRAVENOUS; SUBCUTANEOUS
Status: DISCONTINUED | OUTPATIENT
Start: 2022-10-31 | End: 2022-10-31 | Stop reason: HOSPADM

## 2022-10-31 RX ORDER — ACETAMINOPHEN 500 MG
1000 TABLET ORAL EVERY 6 HOURS
Status: DISCONTINUED | OUTPATIENT
Start: 2022-10-31 | End: 2022-11-03 | Stop reason: HOSPADM

## 2022-10-31 RX ORDER — DIPHENHYDRAMINE HYDROCHLORIDE 50 MG/ML
12.5 INJECTION INTRAMUSCULAR; INTRAVENOUS
Status: DISCONTINUED | OUTPATIENT
Start: 2022-10-31 | End: 2022-10-31 | Stop reason: HOSPADM

## 2022-10-31 RX ORDER — SODIUM CHLORIDE, SODIUM LACTATE, POTASSIUM CHLORIDE, CALCIUM CHLORIDE 600; 310; 30; 20 MG/100ML; MG/100ML; MG/100ML; MG/100ML
INJECTION, SOLUTION INTRAVENOUS CONTINUOUS
Status: ACTIVE | OUTPATIENT
Start: 2022-10-31 | End: 2022-10-31

## 2022-10-31 RX ORDER — HYDROMORPHONE HYDROCHLORIDE 1 MG/ML
0.2 INJECTION, SOLUTION INTRAMUSCULAR; INTRAVENOUS; SUBCUTANEOUS
Status: DISCONTINUED | OUTPATIENT
Start: 2022-10-31 | End: 2022-10-31 | Stop reason: HOSPADM

## 2022-10-31 RX ORDER — MORPHINE SULFATE 4 MG/ML
2 INJECTION INTRAVENOUS ONCE
Status: ACTIVE | OUTPATIENT
Start: 2022-10-31 | End: 2022-11-01

## 2022-10-31 RX ORDER — KETAMINE HYDROCHLORIDE 50 MG/ML
INJECTION, SOLUTION INTRAMUSCULAR; INTRAVENOUS PRN
Status: DISCONTINUED | OUTPATIENT
Start: 2022-10-31 | End: 2022-10-31 | Stop reason: SURG

## 2022-10-31 RX ORDER — SODIUM CHLORIDE AND POTASSIUM CHLORIDE 150; 900 MG/100ML; MG/100ML
INJECTION, SOLUTION INTRAVENOUS CONTINUOUS
Status: DISPENSED | OUTPATIENT
Start: 2022-10-31 | End: 2022-11-01

## 2022-10-31 RX ORDER — TRANEXAMIC ACID 100 MG/ML
INJECTION, SOLUTION INTRAVENOUS PRN
Status: DISCONTINUED | OUTPATIENT
Start: 2022-10-31 | End: 2022-10-31 | Stop reason: SURG

## 2022-10-31 RX ORDER — DOCUSATE SODIUM 100 MG/1
100 CAPSULE, LIQUID FILLED ORAL 2 TIMES DAILY
Status: DISCONTINUED | OUTPATIENT
Start: 2022-10-31 | End: 2022-11-03 | Stop reason: HOSPADM

## 2022-10-31 RX ORDER — CEFAZOLIN SODIUM 1 G/3ML
INJECTION, POWDER, FOR SOLUTION INTRAMUSCULAR; INTRAVENOUS
Status: DISCONTINUED | OUTPATIENT
Start: 2022-10-31 | End: 2022-10-31 | Stop reason: HOSPADM

## 2022-10-31 RX ORDER — LIDOCAINE HYDROCHLORIDE 20 MG/ML
INJECTION, SOLUTION EPIDURAL; INFILTRATION; INTRACAUDAL; PERINEURAL PRN
Status: DISCONTINUED | OUTPATIENT
Start: 2022-10-31 | End: 2022-10-31 | Stop reason: SURG

## 2022-10-31 RX ORDER — ENEMA 19; 7 G/133ML; G/133ML
1 ENEMA RECTAL
Status: DISCONTINUED | OUTPATIENT
Start: 2022-10-31 | End: 2022-11-03 | Stop reason: HOSPADM

## 2022-10-31 RX ORDER — SODIUM CHLORIDE 9 MG/ML
1000 INJECTION, SOLUTION INTRAVENOUS ONCE
Status: DISCONTINUED | OUTPATIENT
Start: 2022-10-31 | End: 2022-10-31

## 2022-10-31 RX ORDER — HYDROMORPHONE HYDROCHLORIDE 1 MG/ML
0.4 INJECTION, SOLUTION INTRAMUSCULAR; INTRAVENOUS; SUBCUTANEOUS
Status: DISCONTINUED | OUTPATIENT
Start: 2022-10-31 | End: 2022-10-31 | Stop reason: HOSPADM

## 2022-10-31 RX ORDER — LABETALOL HYDROCHLORIDE 5 MG/ML
10 INJECTION, SOLUTION INTRAVENOUS
Status: DISCONTINUED | OUTPATIENT
Start: 2022-10-31 | End: 2022-11-03 | Stop reason: HOSPADM

## 2022-10-31 RX ORDER — MORPHINE SULFATE 4 MG/ML
2 INJECTION INTRAVENOUS
Status: DISCONTINUED | OUTPATIENT
Start: 2022-10-31 | End: 2022-11-03 | Stop reason: HOSPADM

## 2022-10-31 RX ORDER — VANCOMYCIN HYDROCHLORIDE 1 G/20ML
INJECTION, POWDER, LYOPHILIZED, FOR SOLUTION INTRAVENOUS
Status: COMPLETED | OUTPATIENT
Start: 2022-10-31 | End: 2022-10-31

## 2022-10-31 RX ORDER — LISINOPRIL AND HYDROCHLOROTHIAZIDE 20; 12.5 MG/1; MG/1
1 TABLET ORAL DAILY
Status: DISCONTINUED | OUTPATIENT
Start: 2022-10-31 | End: 2022-10-31

## 2022-10-31 RX ORDER — POLYETHYLENE GLYCOL 3350 17 G/17G
1 POWDER, FOR SOLUTION ORAL 2 TIMES DAILY PRN
Status: DISCONTINUED | OUTPATIENT
Start: 2022-10-31 | End: 2022-11-03 | Stop reason: HOSPADM

## 2022-10-31 RX ORDER — ONDANSETRON 4 MG/1
4 TABLET, ORALLY DISINTEGRATING ORAL EVERY 4 HOURS PRN
Status: DISCONTINUED | OUTPATIENT
Start: 2022-10-31 | End: 2022-11-03 | Stop reason: HOSPADM

## 2022-10-31 RX ORDER — AMOXICILLIN 250 MG
1 CAPSULE ORAL NIGHTLY
Status: DISCONTINUED | OUTPATIENT
Start: 2022-10-31 | End: 2022-11-03 | Stop reason: HOSPADM

## 2022-10-31 RX ORDER — DEXMEDETOMIDINE HYDROCHLORIDE 100 UG/ML
INJECTION, SOLUTION INTRAVENOUS PRN
Status: DISCONTINUED | OUTPATIENT
Start: 2022-10-31 | End: 2022-10-31 | Stop reason: SURG

## 2022-10-31 RX ORDER — AMOXICILLIN 250 MG
1 CAPSULE ORAL
Status: DISCONTINUED | OUTPATIENT
Start: 2022-10-31 | End: 2022-11-03 | Stop reason: HOSPADM

## 2022-10-31 RX ORDER — TIZANIDINE 4 MG/1
4 TABLET ORAL 3 TIMES DAILY PRN
Status: DISCONTINUED | OUTPATIENT
Start: 2022-10-31 | End: 2022-11-03 | Stop reason: HOSPADM

## 2022-10-31 RX ORDER — HEPARIN SODIUM,PORCINE 1000/ML
VIAL (ML) INJECTION
Status: DISCONTINUED | OUTPATIENT
Start: 2022-10-31 | End: 2022-10-31 | Stop reason: HOSPADM

## 2022-10-31 RX ORDER — ONDANSETRON 2 MG/ML
4 INJECTION INTRAMUSCULAR; INTRAVENOUS
Status: COMPLETED | OUTPATIENT
Start: 2022-10-31 | End: 2022-10-31

## 2022-10-31 RX ADMIN — ROPIVACAINE HYDROCHLORIDE: 2 INJECTION, SOLUTION EPIDURAL; INFILTRATION; PERINEURAL at 13:02

## 2022-10-31 RX ADMIN — EPHEDRINE SULFATE 10 MG: 50 INJECTION INTRAMUSCULAR; INTRAVENOUS; SUBCUTANEOUS at 09:02

## 2022-10-31 RX ADMIN — DEXAMETHASONE SODIUM PHOSPHATE 4 MG: 4 INJECTION, SOLUTION INTRA-ARTICULAR; INTRALESIONAL; INTRAMUSCULAR; INTRAVENOUS; SOFT TISSUE at 10:49

## 2022-10-31 RX ADMIN — HYDROCODONE BITARTRATE AND ACETAMINOPHEN 7.5 MG: 7.5; 325 SOLUTION ORAL at 12:00

## 2022-10-31 RX ADMIN — ROCURONIUM BROMIDE 30 MG: 10 INJECTION, SOLUTION INTRAVENOUS at 08:08

## 2022-10-31 RX ADMIN — ONDANSETRON 4 MG: 2 INJECTION INTRAMUSCULAR; INTRAVENOUS at 12:15

## 2022-10-31 RX ADMIN — PROPOFOL 50 MG: 10 INJECTION, EMULSION INTRAVENOUS at 08:27

## 2022-10-31 RX ADMIN — Medication: at 17:51

## 2022-10-31 RX ADMIN — FENTANYL CITRATE 50 MCG: 50 INJECTION, SOLUTION INTRAMUSCULAR; INTRAVENOUS at 12:29

## 2022-10-31 RX ADMIN — CEFAZOLIN 1.5 G: 330 INJECTION, POWDER, FOR SOLUTION INTRAMUSCULAR; INTRAVENOUS at 08:08

## 2022-10-31 RX ADMIN — ACETAMINOPHEN 1000 MG: 500 TABLET ORAL at 23:39

## 2022-10-31 RX ADMIN — TIZANIDINE 4 MG: 4 TABLET ORAL at 12:11

## 2022-10-31 RX ADMIN — HYDROMORPHONE HYDROCHLORIDE 0.2 MG: 1 INJECTION, SOLUTION INTRAMUSCULAR; INTRAVENOUS; SUBCUTANEOUS at 13:54

## 2022-10-31 RX ADMIN — SODIUM CHLORIDE, POTASSIUM CHLORIDE, SODIUM LACTATE AND CALCIUM CHLORIDE: 600; 310; 30; 20 INJECTION, SOLUTION INTRAVENOUS at 08:03

## 2022-10-31 RX ADMIN — CEFAZOLIN 2 G: 2 INJECTION, POWDER, FOR SOLUTION INTRAMUSCULAR; INTRAVENOUS at 23:40

## 2022-10-31 RX ADMIN — EPHEDRINE SULFATE 15 MG: 50 INJECTION INTRAMUSCULAR; INTRAVENOUS; SUBCUTANEOUS at 09:05

## 2022-10-31 RX ADMIN — TRANEXAMIC ACID 1000 MG: 100 INJECTION, SOLUTION INTRAVENOUS at 08:10

## 2022-10-31 RX ADMIN — HYDROMORPHONE HYDROCHLORIDE 0.2 MG: 1 INJECTION, SOLUTION INTRAMUSCULAR; INTRAVENOUS; SUBCUTANEOUS at 12:58

## 2022-10-31 RX ADMIN — DEXMEDETOMIDINE 60 MCG: 200 INJECTION, SOLUTION INTRAVENOUS at 08:16

## 2022-10-31 RX ADMIN — DOCUSATE SODIUM 100 MG: 100 CAPSULE, LIQUID FILLED ORAL at 17:03

## 2022-10-31 RX ADMIN — FENTANYL CITRATE 25 MCG: 50 INJECTION, SOLUTION INTRAMUSCULAR; INTRAVENOUS at 12:15

## 2022-10-31 RX ADMIN — ONDANSETRON 4 MG: 2 INJECTION INTRAMUSCULAR; INTRAVENOUS at 11:04

## 2022-10-31 RX ADMIN — SODIUM CHLORIDE 500 ML: 9 INJECTION, SOLUTION INTRAVENOUS at 16:36

## 2022-10-31 RX ADMIN — PROPOFOL 150 MG: 10 INJECTION, EMULSION INTRAVENOUS at 08:08

## 2022-10-31 RX ADMIN — FENTANYL CITRATE 25 MCG: 50 INJECTION, SOLUTION INTRAMUSCULAR; INTRAVENOUS at 11:55

## 2022-10-31 RX ADMIN — ONDANSETRON 4 MG: 2 INJECTION INTRAMUSCULAR; INTRAVENOUS at 17:35

## 2022-10-31 RX ADMIN — CEFAZOLIN 2 G: 2 INJECTION, POWDER, FOR SOLUTION INTRAMUSCULAR; INTRAVENOUS at 17:03

## 2022-10-31 RX ADMIN — HYDROCODONE BITARTRATE AND ACETAMINOPHEN 7.5 MG: 7.5; 325 SOLUTION ORAL at 13:00

## 2022-10-31 RX ADMIN — LIDOCAINE HYDROCHLORIDE 60 MG: 20 INJECTION, SOLUTION EPIDURAL; INFILTRATION; INTRACAUDAL at 08:08

## 2022-10-31 RX ADMIN — ACETAMINOPHEN 1000 MG: 500 TABLET ORAL at 17:03

## 2022-10-31 RX ADMIN — SUGAMMADEX 200 MG: 100 INJECTION, SOLUTION INTRAVENOUS at 11:20

## 2022-10-31 RX ADMIN — KETAMINE HYDROCHLORIDE 100 MG: 50 INJECTION INTRAMUSCULAR; INTRAVENOUS at 08:27

## 2022-10-31 RX ADMIN — SODIUM CHLORIDE, POTASSIUM CHLORIDE, SODIUM LACTATE AND CALCIUM CHLORIDE: 600; 310; 30; 20 INJECTION, SOLUTION INTRAVENOUS at 06:00

## 2022-10-31 RX ADMIN — POTASSIUM CHLORIDE AND SODIUM CHLORIDE: 900; 150 INJECTION, SOLUTION INTRAVENOUS at 17:36

## 2022-10-31 ASSESSMENT — PAIN DESCRIPTION - PAIN TYPE
TYPE: SURGICAL PAIN

## 2022-10-31 ASSESSMENT — PAIN SCALES - GENERAL: PAIN_LEVEL: 5

## 2022-10-31 ASSESSMENT — FIBROSIS 4 INDEX: FIB4 SCORE: 1.44

## 2022-10-31 NOTE — ANESTHESIA PROCEDURE NOTES
Airway    Date/Time: 10/31/2022 8:08 AM  Performed by: Ascencion Alex M.D.  Authorized by: Ascencion Alex M.D.     Location:  OR  Urgency:  Elective  Indications for Airway Management:  Anesthesia      Spontaneous Ventilation: absent    Sedation Level:  Deep  Preoxygenated: Yes    Patient Position:  Sniffing  Final Airway Type:  Endotracheal airway  Final Endotracheal Airway:  ETT  Cuffed: Yes    Technique Used for Successful ETT Placement:  Direct laryngoscopy    Insertion Site:  Oral  Blade Type:  Burrell  Laryngoscope Blade/Videolaryngoscope Blade Size:  2  ETT Size (mm):  7.0  Measured from:  Teeth  ETT to Teeth (cm):  22  Placement Verified by: auscultation and capnometry    Cormack-Lehane Classification:  Grade I - full view of glottis  Number of Attempts at Approach:  1

## 2022-10-31 NOTE — OR NURSING
1140: Pt arrives to PACU asleep and calm. VSS. Posterior back dressing CDI with 2 hemovacs and one On Qpump in place.     1234: Pts daughter called 2x no answer.     1330: Pt resting comfortably on her left side. Dressing CDI    1425: Back dressing is CDI. Hemovacs drained. Pt states pain is tolerable and denies nausea. Report called to Adele BENITEZ.

## 2022-10-31 NOTE — ANESTHESIA POSTPROCEDURE EVALUATION
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 Responsible Provider: Wu Almanza M.D.    Anesthesia Type: general ASA Status: 2          Final Anesthesia Type: general  Last vitals  BP   Blood Pressure : 111/56    Temp   36.4 °C (97.6 °F)    Pulse   70   Resp   13    SpO2   97 %      Anesthesia Post Evaluation    Patient location during evaluation: PACU  Patient participation: complete - patient participated  Level of consciousness: awake and alert  Pain score: 5    Airway patency: patent  Anesthetic complications: no  Cardiovascular status: hemodynamically stable  Respiratory status: acceptable  Hydration status: euvolemic    PONV: none          No notable events documented.     Nurse Pain Score: 7 (NPRS)

## 2022-10-31 NOTE — LETTER
September 30, 2022      Patient Name: Michelle Mckeon  Surgeon Name: Tammy Rowland M.D.  Surgery Facility: [facility] ([address] [facility])  Surgery Date: 10/31/2022    The time of your surgery is not final and may change up to and until the day of your surgery. You will be contacted 1-2 business days prior to your surgery date with your check-in and surgery time.    BEFORE YOUR SURGERY - WITH THE FACILITY  Pre Registration and/or Lab Work/Tests must be done as soon as possible (within 28 days before your surgery date). These will be completed at [facility] with an appointment.    On 10.5.22 - if you have not already heard from [facility] Pre Admission/Registration Department, please call them at [phone] option 2, then option 1, for an appointment.      BEFORE YOUR SURGERY - WITH YOUR SURGEONS OFFICE  Pre op Appointment:   Date: 10.17.22   Time: 11:00am   Provider: Ferny Garcia PA-C   Location: [address] [place] [address]    Bring a list of all medications you are currently taking including the dosing and frequency.    Please read the MEDICATION INSTRUCTIONS below completely.    DAY OF YOUR SURGERY  Nothing to eat or drink and refrain from smoking any substance after midnight the day of your surgery. Smoking may interfere with the anesthetic and frequently produces nausea during the recovery period.    Continue taking all lifesaving medications, including the morning of your surgery with small sip of water.    Please arrive at the hospital/surgery center at the check-in time provided.     You will need a responsible adult to drive you to and from your surgery.    AFTER YOUR SURGERY  2 Week Post op Appointment:   Date: 11.14.22   Time: 11:30am  With: Ferny Garcia PA-C  Location: [address] [place], [place] [address]    6 Week Post op Appointment:   Date: 12.15.22   Time: 11:30am   With: Ferny Garcia PA-C  Location: [address] [place], [place] [address]    MEDICATION INSTRUCTIONS   Do not take these medications  prior to your procedure:  • Anti-inflammatories: stop 7 days prior, restart when advised. For fusions avoid for 12 weeks after surgery  • Naproxen (Naprosyn or Alleve)  • Motrin  • Ibuprofen  • Nabumetone (Relafen)  • Meloxicam (Mobic)  • Celebrex  • Salsalate  • Diclofenac (Arthrotec, Voltaren, Flector)  • Sulindac (Clinoril  • Etodolac (Lodine)  • Indomethacin (Indocin)  • Ketoprofen  • Ketorolac  • Oxaprozin (Daypro)  • Piroxicam (Feldene)  • Blood thinners (stop after approval from the prescribing physician)  • Aspirin (any dosage): 10 days prior, restart 7 days after procedure  • Any medications that contain aspirin in combination (ie: Excedrin migraine, Fiorinal, and Norgesic)  • Warfarin (Coumadin): Stop 7 days prior, INR day of procedure, restart 2-3 weeks after procedure  • Antiplatelet: restart 7 days after procedure  • Ticlid (ticlopidine): Stop 14 days prior  • Plavix (clopidogrel): Stop 7 days prior  • Aggrenox or Dipyridamole: Stop 14 days prior  • ReoPro (abciximab): Stop 14 days prior  • Integrilin (eptifibatide): Stop 14 days prior  • Aggrastat (tirofiban): Stop 14 days prior  • Lovenox (Enoxaparin): Stop 24hrs before and restart 24hrs after procedure  • Heparin: Stop 24hrs before   • Dalteparin (Fragmin): Stop 24hrs before  • Fondaparinux (Arixtra): Stop 24hrs before  • Xeralto, Dabigatran (Pradaxa) Stop 5 days prior  • Eliquis (apibaxan) Stop 7 days prior    Okay to take these medications as prescribed:  • Muscle relaxers  • Acetaminophen and pain medications that have it in addition to oxycodone and hydrocodone  • Blood pressure, cholesterol and diabetes medications are ok    TIME OFF WORK  FMLA or Disability forms can be faxed directly to [phone] or you may drop them off at any [facility]. Our office charges a $35.00 fee. Forms will be completed within 5-10 business days after payment is received. For the status of your forms you may contact our disability office directly at  [phone].    DENTAL PROCDURES/CLEANINGS Avoid 3 weeks before surgery and for 3 months after surgery.    QUESTIONS ABOUT COSTS  Contact our Patient Financial Services department at [phone] for more information.    If you have any questions, please contact our office.    Amanda Yung   Surgery Scheduler  [email or web address]  ? [phone]   Fax: [phone]  [address]  [place] [address]  [phone]

## 2022-10-31 NOTE — OR SURGEON
Immediate Post OP Note    PreOp Diagnosis:       1.  Grade 1/2 L4-5 spondylolisthesis with mobile grade 1 L3-4 spondylolisthesis  2.  Mild spinal stenosis L2-3, mild to moderate stenosis L3-4 with right-sided facet joint edema with severe stenosis L4-5.  3.  Failed conservative therapy      PostOp Diagnosis:        1.  Grade 1/2 L4-5 spondylolisthesis with mobile grade 1 L3-4 spondylolisthesis  2.  Mild spinal stenosis L2-3, mild to moderate stenosis L3-4 with right-sided facet joint edema with severe stenosis L4-5.  3.  Failed conservative therapy    Procedure(s):  FUSION, SPINE, LIMBAR, TLIF, WITH O-ARM GUIDANCE L3-5  - Wound Class: Clean with Drain  LAMINECTOMY, SPINE, LUMBAR, L3-5 - Wound Class: Clean with Drain  FORAMINOTOMY, SPINE - Wound Class: Clean with Drain  DECOMPRESSION, SPINE, LUMBAR L3-4, 4-5 - Wound Class: Clean with Drain    Surgeon(s):  Tammy Rowland M.D.    Anesthesiologist/Type of Anesthesia:  Anesthesiologist: Wu Almanza M.D.; Ascencion Alex M.D./General    Surgical Staff:  Assistant: Ferny Garcia P.A.-C.  Cell Saver : Katelyn Arita  Circulator: Tierra De Anda R.N.; Yandy Sky R.N.  Relief Scrub: Frankie Quintana  Scrub Person: April Liriano  Radiology Technologist: Vianey Ying    Specimens removed if any:  * No specimens in log *    Assistants:  Ferny Garcia PA-C  ,  Specimen: nil    Estimated Blood Loss: 300 cc    Findings: n/a    Complications: nil         10/31/2022 11:39 AM Tammy Rowland M.D.

## 2022-10-31 NOTE — PROGRESS NOTES
No change to my last H and P (that is labelled a progress note). The note was made by either myself, or my PAs Ubaldo Braga or Ferny Garcia but is signed by me.If it was done by my physician assistant, I verify it is correct and has my co-signature.    Tammy Rowland MD PhD ROLANDO

## 2022-10-31 NOTE — ANESTHESIA TIME REPORT
Anesthesia Start and Stop Event Times     Date Time Event    10/31/2022 0800 Ready for Procedure     0803 Anesthesia Start     1138 Anesthesia Stop        Responsible Staff  10/31/22    Name Role Begin End    Ascencion Alex M.D. Anesth 0803 1114    Wu Almanza M.D. Anesth 1114 1138        Overtime Reason:  no overtime (within assigned shift)    Comments:

## 2022-10-31 NOTE — PROGRESS NOTES
Size medium Deroyal off the shelf LSO back support brace has been delivered to pt's bedside for pt to wear as needed. Currently pt is sleeping at this time. If any assistance is needed with sizing of brace or assistance with applying and fitting brace please do not hesitate to contact the ortho tech team.

## 2022-10-31 NOTE — OP REPORT
DATE OF SERVICE  10/31/2022      2. SURGEON:  Tammy Rowland MD, PhD, ROLANDO, Neurosurgeon    3. ASSISTANT:  Ferny Garcia PA-C  An assistant was crucial to have during the case as the assistant helped with positioning, keeping the field clear of blood and retraction. This case could not be done easily without a qualified assistant. It was medically necessary.       TYPE OF ANESTHESIA:  General anesthesia with endotracheal intubation.     PREOPERATIVE DIAGNOSES:    1.  Grade 1/2 L4-5 spondylolisthesis with mobile grade 1 L3-4 spondylolisthesis  2.  Mild spinal stenosis L2-3, mild to moderate stenosis L3-4 with right-sided facet joint edema with severe stenosis L4-5.  3.  Failed conservative therapy     POSTOPERATIVE DIAGNOSES:      1.  Grade 1/2 L4-5 spondylolisthesis with mobile grade 1 L3-4 spondylolisthesis  2.  Mild spinal stenosis L2-3, mild to moderate stenosis L3-4 with right-sided facet joint edema with severe stenosis L4-5.  3.  Failed conservative therapy     INDICATIONS:       8/16/2022  This is a very nice 86-year-old woman.  She describes symptoms for the last 8 to 9 months.  She gets classic claudicatory pain.  When she sitting she is good.  When she stands and walks he has classic claudicatory pain.  Is down back of both legs.  Right equals left.  The symptoms numbness and tingling.  When she walks it is worse.  When she stands for period of time as bad as well.  The bladder and bowel are okay.  If she sits down she feels better.    She has an epidural booked for next week.    She is taking 1 mg gabapentin at night.    She is participating physical therapy.    She has a daughter.  She has several people who live with her.  One of her friends was with her here today.    She has type 2 diabetes.    She has stage III kidney disease.    She has had both knees replaced.    She is retired.     9/27/2022  Patient returns.  Continues with bilateral leg pain.  The epidural made the left leg worse.  She stopped the  gabapentin due to dizziness.  She is completed physical therapy.  She is requesting surgery which is reasonable.    PHYSICAl EXAMINATION:    8/16/2022  When I examined her she was a little bit frail.  She had a reduced range of motion lumbar spine.     Last Imaging Result(s):      The patient had plain x-rays at [facility].  She had an MRI scan at [facility].  There is a grade 1/2 L4-5 spondylolisthesis and a grade 1 L3-4 spondylolisthesis.  The 4 5 gets worse in flexion.  On the MRI scan there is moderate spinal stenosis with right-sided facet edema at L3-4 with severe stenosis at L4-5.  There is mild stenosis at L2-3.  There is an old L1 compression fracture.  The MRI scan was performed on 7/16/2022       TITLE OF PROCEDURE:  L3 decompressive laminectomy and bilateral foraminotomies  L4 decompressive laminectomy and bilateral foraminotomies.  L5 decompressive laminectomy and bilateral foraminotomies.  (laminectomies for neural decompression, not just for implant placement)  Complete bilateral facetectomies at L4-L5 and L3-4  Bilateral Lee osteotomies to facilitate reduction and decompression of L3 and L4.  L3-L4-L5 segmental fixation using  pedicle screws and rods.  O-arm navigation for screw placement.  Transpedicular bilateral decompression, L3-4 and L4-5 using a left-sidedapproach.  Transforaminal bilateral decompression of the exiting nerve roots, L3-4 and L4-5  L3-4 and L4-L5 interbody fusion using local morcellized autograft and Infuse BMP   Placement of expandibleTitanium interbody cages at L3-4 and L4-5   L3-L4-L5 posterolateral fusion using infused BMP matrix MasterGraft and local morcellized autograft.  Microscopic magnification.  Open reduction of L3-4 and L4-L5 spondylolisthesis.  Insertion of paraspinal On-Q pain catheters.      IMPLANTS USED:    Medtronic solera screws and rods  Medtronic FreeWheelyft expandable interbody cage  Matrix master graft and infuse BMP     OPERATIVE  FINDINGS:    Grade 1/2 L4/5 slip  Grade 1 L3/4 slip  Severe stenosis L4/5, moderate stenosis L3/4    The degree of compression was  significant. The bone work required was way beyond that which was required for implant placement and I had to perform decompressions as a separate procedure to the bone work required for implant placement. The stenosis was bilateral and at the levels described and needed to be addressed. Additional bone work was required for placement of the cage but this was distinctly different to the decompressions required to address stenosis.      OPERATIVE DETAILS:  After fully informed consent, the patient was brought to the operating room.  General anesthesia was administered.  Patient was given intravenous antibiotic.  A Stout catheter was placed.  She was carefully turned prone on the OSI operating table in the Darius frame.  All pressure points padded.  Posterior lumbar region was prepped and draped in a standard fashion.      After localizing x-ray, a midline incision was made over the spinous processes from L3 down to L5..  Bilateral subperiosteal exposure was affected.  I exposed the transverse processes of L4, L5 and L3.  Once I had exposed, it was these were decorticated for later bone grafting.  I initially placed Infuse BMP wrapped around matrix MasterGraft x4 bricks on either side from L3 to L5.  I then placed a right iliac spike.  The O-arm was brought in and the spin was affected.      Using O-arm navigation, I then cannulated sequentially the L4, L5 and L3 pedicles.  In each case, the pedicles were cannulated, palpated, tapped, and  then 6.5 pedicle screws were placed at L3, L4 and L5. These were  50 mm in length.   I was happy with the placement of the instrumentation.  All the screws were stimulated to eensure there was no breach.  AP and lateral x-ray was taken.      The microscope was brought in the field of view.  I then performed the decompression.  I performed complete  laminectomies of  L4, and the superior half of the L5 and the inferior half of L3.  I then performed complete facetectomies and osteotomies in a Lee fashion of of L3 and L4.  I then removing the facet joints to facilitate reduction of  spondylolisthesis an realignment.  The facet joints were taken off completely.  The lamina was thinned down with an AM8 drill bit.  Then, I used a combination of 4, 3 and 2 mm Kerrison punches, removed the remaining bone, ligament and decompressed the L3-L4 and L5  nerve roots bilaterally.      With gentle medial retraction initially of the L5 nerve root, I entered the L4-5 disk space, I then used various alina and gouges to clean and prepare the disc space, packed bone graft anteriorly, which was morcellized local autograft.  used O-arm navigation to clean out the disk space entirely.    I took great care not to violate the anterior endplate.       I then placed a 9x29 mm expandible cage into the interspace.  This was distracted and countersunk.    I did the same approach at L3-4.  I cleaned out the disc space.  I decorticated the endplates.  There was gentle medial retraction of the L4 nerve root.  At this level I placed an 9x29mm  expandable cage after packing bone graft in the anterior and interspace.  Each of the cages was filled with infuse BMP.         I took final AP and lateral x-ray of the instrument, the cages were placed.  I followed out the nerve roots on either side.    All the foramina were decompressed.  The traversing exiting nerve roots at L3-4 and L4-5 were all free on both sides.  All the nerve roots were free.  The cages were countersunk.      The remaining bone graft was morcellized and placed decorticated gutters from L3-L5.    Two appropriately sized rods were secured, these were titaniym rods.  Locking caps were placed.    I performed an open reductionas needed of the spondylolisthesis at L3-4 and L4-5.     Final AP and  lateral x-ray was taken.  All  instrumentation was tightened.  Hemostasis obtained.      Vancomycin powder was placed in the wound.  The wound was then closed using multiple interrupted Vicryl sutures with staples to skin.  Two paraspinal On-Q pain catheters were placed in the paraspinal musculature to infuse ropivacaine.  I also placed 2 suction subfascial Hemovacs.  The stab incision in the right iliac crest was closed with 2-0 and 3-0 Vicryl sutures and staples.  A dressing was applied. All counts were correct and all instruments accounted for.       COMPLICATIONS:  Nil.     ESTIMATED BLOOD LOSS: 300 mL.     The surgery went well.   the patient was moving both lower extremities well at the end   of the case.  We will try to get the patient up later today.  We will try to get the patient   home in 48 hours.     Tammy Rowland MD, PhD, ROLANDO

## 2022-10-31 NOTE — ANESTHESIA PREPROCEDURE EVALUATION
Case: [id number] Date/Time: 10/31/22 0645    Procedures:       L3-4 and L4-5 decompressive laminectomy bilateral foraminotomies and an L3-4-5 posterior lumbar instrumented fusion      LAMINECTOMY, SPINE, LUMBAR, WITH DISCECTOMY      FORAMINOTOMY, SPINE      DECOMPRESSION, SPINE, LUMBAR    Diagnosis:       Neurogenic claudication due to lumbar spinal stenosis [M48.062]      Spondylolisthesis, lumbar region [M43.16]    Pre-op diagnosis:       Neurogenic claudication due to lumbar spinal stenosis [M48.062]      Spondylolisthesis, lumbar region [M43.16]    Location: [place] [address] / SURGERY [facility]    Surgeons: Tammy Rowland M.D.          Relevant Problems   NEURO   (positive) TIA (transient ischemic attack)      CARDIAC   (positive) Mild aortic stenosis   (positive) Murmur, cardiac      ENDO   (positive) Acquired hypothyroidism       Physical Exam    Airway   Mallampati: II  TM distance: >3 FB  Neck ROM: full       Cardiovascular - normal exam  Rhythm: regular  Rate: normal  (-) murmur     Dental - normal exam           Pulmonary - normal exam  Breath sounds clear to auscultation     Abdominal    Neurological - normal exam                 Anesthesia Plan    ASA 2       Plan - general       Airway plan will be ETT          Induction: intravenous      Pertinent diagnostic labs and testing reviewed    Informed Consent:    Anesthetic plan and risks discussed with patient.

## 2022-10-31 NOTE — PROGRESS NOTES
Postop review.  Some left groin pain.  Nothing down the legs.  Blood pressure 95.  Hemovac was disconnected.  No IV fluids were running.  We have instructed the nurse to give a stat bolus of 500 cc of saline and recheck the blood pressure.  I am not sure why this patient does not have intravenous fluids running postoperatively.  I have also spoken to PACU and they said that this is not the regular protocol.  Restarting IV fluids has bee n reviewed added to the nurse.  The patient herself looks very good.

## 2022-11-01 ENCOUNTER — APPOINTMENT (OUTPATIENT)
Dept: RADIOLOGY | Facility: MEDICAL CENTER | Age: 87
DRG: 455 | End: 2022-11-01
Attending: NEUROLOGICAL SURGERY
Payer: MEDICARE

## 2022-11-01 LAB
ANION GAP SERPL CALC-SCNC: 7 MMOL/L (ref 7–16)
APTT PPP: 25.4 SEC (ref 24.7–36)
BUN SERPL-MCNC: 14 MG/DL (ref 8–22)
CALCIUM SERPL-MCNC: 7.3 MG/DL (ref 8.5–10.5)
CHLORIDE SERPL-SCNC: 104 MMOL/L (ref 96–112)
CO2 SERPL-SCNC: 21 MMOL/L (ref 20–33)
CREAT SERPL-MCNC: 0.91 MG/DL (ref 0.5–1.4)
ERYTHROCYTE [DISTWIDTH] IN BLOOD BY AUTOMATED COUNT: 51.5 FL (ref 35.9–50)
GFR SERPLBLD CREATININE-BSD FMLA CKD-EPI: 61 ML/MIN/1.73 M 2
GLUCOSE SERPL-MCNC: 127 MG/DL (ref 65–99)
HCT VFR BLD AUTO: 32.8 % (ref 37–47)
HGB BLD-MCNC: 11 G/DL (ref 12–16)
INR PPP: 1.16 (ref 0.87–1.13)
MCH RBC QN AUTO: 28.7 PG (ref 27–33)
MCHC RBC AUTO-ENTMCNC: 33.5 G/DL (ref 33.6–35)
MCV RBC AUTO: 85.6 FL (ref 81.4–97.8)
PLATELET # BLD AUTO: 248 K/UL (ref 164–446)
PMV BLD AUTO: 10.3 FL (ref 9–12.9)
POTASSIUM SERPL-SCNC: 4.8 MMOL/L (ref 3.6–5.5)
PROTHROMBIN TIME: 14.6 SEC (ref 12–14.6)
RBC # BLD AUTO: 3.83 M/UL (ref 4.2–5.4)
SODIUM SERPL-SCNC: 132 MMOL/L (ref 135–145)
WBC # BLD AUTO: 20.8 K/UL (ref 4.8–10.8)

## 2022-11-01 PROCEDURE — 770001 HCHG ROOM/CARE - MED/SURG/GYN PRIV*

## 2022-11-01 PROCEDURE — 85610 PROTHROMBIN TIME: CPT

## 2022-11-01 PROCEDURE — 72100 X-RAY EXAM L-S SPINE 2/3 VWS: CPT

## 2022-11-01 PROCEDURE — 700101 HCHG RX REV CODE 250: Performed by: PHYSICIAN ASSISTANT

## 2022-11-01 PROCEDURE — 85730 THROMBOPLASTIN TIME PARTIAL: CPT

## 2022-11-01 PROCEDURE — 97163 PT EVAL HIGH COMPLEX 45 MIN: CPT

## 2022-11-01 PROCEDURE — 85027 COMPLETE CBC AUTOMATED: CPT

## 2022-11-01 PROCEDURE — A9270 NON-COVERED ITEM OR SERVICE: HCPCS | Performed by: PHYSICIAN ASSISTANT

## 2022-11-01 PROCEDURE — 80048 BASIC METABOLIC PNL TOTAL CA: CPT

## 2022-11-01 PROCEDURE — 700102 HCHG RX REV CODE 250 W/ 637 OVERRIDE(OP): Performed by: PHYSICIAN ASSISTANT

## 2022-11-01 PROCEDURE — 36415 COLL VENOUS BLD VENIPUNCTURE: CPT

## 2022-11-01 PROCEDURE — 99024 POSTOP FOLLOW-UP VISIT: CPT | Performed by: PHYSICIAN ASSISTANT

## 2022-11-01 RX ORDER — OXYCODONE HYDROCHLORIDE 5 MG/1
5 TABLET ORAL EVERY 4 HOURS PRN
Status: DISCONTINUED | OUTPATIENT
Start: 2022-11-01 | End: 2022-11-03 | Stop reason: HOSPADM

## 2022-11-01 RX ORDER — OXYCODONE HYDROCHLORIDE 5 MG/1
2.5 TABLET ORAL EVERY 4 HOURS PRN
Status: DISCONTINUED | OUTPATIENT
Start: 2022-11-01 | End: 2022-11-03 | Stop reason: HOSPADM

## 2022-11-01 RX ORDER — SODIUM CHLORIDE 1 G/1
1 TABLET ORAL
Status: DISCONTINUED | OUTPATIENT
Start: 2022-11-01 | End: 2022-11-03 | Stop reason: HOSPADM

## 2022-11-01 RX ADMIN — OXYCODONE 2.5 MG: 5 TABLET ORAL at 09:39

## 2022-11-01 RX ADMIN — POTASSIUM CHLORIDE AND SODIUM CHLORIDE: 900; 150 INJECTION, SOLUTION INTRAVENOUS at 02:08

## 2022-11-01 RX ADMIN — SODIUM CHLORIDE 1 G: 1 TABLET ORAL at 09:39

## 2022-11-01 RX ADMIN — SODIUM CHLORIDE 1 G: 1 TABLET ORAL at 17:08

## 2022-11-01 RX ADMIN — DOCUSATE SODIUM 100 MG: 100 CAPSULE, LIQUID FILLED ORAL at 17:08

## 2022-11-01 RX ADMIN — ACETAMINOPHEN 1000 MG: 500 TABLET ORAL at 23:26

## 2022-11-01 RX ADMIN — LEVOTHYROXINE SODIUM 75 MCG: 0.07 TABLET ORAL at 06:00

## 2022-11-01 RX ADMIN — POTASSIUM CHLORIDE AND SODIUM CHLORIDE: 900; 150 INJECTION, SOLUTION INTRAVENOUS at 10:54

## 2022-11-01 RX ADMIN — SODIUM CHLORIDE 1 G: 1 TABLET ORAL at 12:46

## 2022-11-01 RX ADMIN — ACETAMINOPHEN 1000 MG: 500 TABLET ORAL at 17:08

## 2022-11-01 RX ADMIN — DOCUSATE SODIUM 100 MG: 100 CAPSULE, LIQUID FILLED ORAL at 06:00

## 2022-11-01 RX ADMIN — OXYCODONE 2.5 MG: 5 TABLET ORAL at 23:26

## 2022-11-01 RX ADMIN — ACETAMINOPHEN 1000 MG: 500 TABLET ORAL at 12:46

## 2022-11-01 RX ADMIN — ACETAMINOPHEN 1000 MG: 500 TABLET ORAL at 06:00

## 2022-11-01 RX ADMIN — LISINOPRIL 20 MG: 20 TABLET ORAL at 06:01

## 2022-11-01 ASSESSMENT — COGNITIVE AND FUNCTIONAL STATUS - GENERAL
CLIMB 3 TO 5 STEPS WITH RAILING: A LITTLE
SUGGESTED CMS G CODE MODIFIER MOBILITY: CK
MOBILITY SCORE: 18
MOVING TO AND FROM BED TO CHAIR: A LITTLE
STANDING UP FROM CHAIR USING ARMS: A LITTLE
TURNING FROM BACK TO SIDE WHILE IN FLAT BAD: A LITTLE
WALKING IN HOSPITAL ROOM: A LITTLE
MOVING FROM LYING ON BACK TO SITTING ON SIDE OF FLAT BED: A LITTLE

## 2022-11-01 ASSESSMENT — PAIN DESCRIPTION - PAIN TYPE
TYPE: SURGICAL PAIN

## 2022-11-01 ASSESSMENT — GAIT ASSESSMENTS
DEVIATION: BRADYKINETIC
GAIT LEVEL OF ASSIST: CONTACT GUARD ASSIST
DISTANCE (FEET): 2
ASSISTIVE DEVICE: HAND HELD ASSIST

## 2022-11-01 NOTE — CARE PLAN
Problem: Pain - Standard  Goal: Alleviation of pain or a reduction in pain to the patient’s comfort goal  Outcome: Progressing   The patient is Stable - Low risk of patient condition declining or worsening    Shift Goals  Clinical Goals: Mobility, pain control, D/C dumont  Patient Goals: Rest    Progress made toward(s) clinical / shift goals:  PCA at bedside for pain control.     Patient is not progressing towards the following goals:n/a

## 2022-11-01 NOTE — PROGRESS NOTES
"Neurosurgery  POD# 1  Seen with Dr. Rowland  Ambulating  Voiding - dumont out this morning  Tolerating oral medications  Denies nausea, vomiting  Pain controlled on current medication regimen  Leg symptoms improved, some left hip pain whilst in bed    Objective:  BP (!) 95/57   Pulse 81   Temp 36.5 °C (97.7 °F) (Temporal)   Resp 17   Ht 1.575 m (5' 2\")   Wt 63.1 kg (139 lb 1.8 oz)   SpO2 94%     Intake/Output Summary (Last 24 hours) at 11/1/2022 0748  Last data filed at 11/1/2022 0425  Gross per 24 hour   Intake 3100 ml   Output 1785 ml   Net 1315 ml       Recent Labs     11/01/22  0410   WBC 20.8*   RBC 3.83*   HEMOGLOBIN 11.0*   HEMATOCRIT 32.8*   MCV 85.6   MCH 28.7   MCHC 33.5*   RDW 51.5*   PLATELETCT 248   MPV 10.3     Recent Labs     11/01/22  0410   SODIUM 132*   POTASSIUM 4.8   CHLORIDE 104   CO2 21   GLUCOSE 127*   BUN 14     Recent Labs     11/01/22  0410   APTT 25.4   INR 1.16*     Hypotensive intermittently, otherwise VSS  Hgb 11.0, WBC 20.8,  - add salt tabs, watch blood levels  Hemovacs 130 and 40cc/8hr am  Surgical incision clean, dry, intact, no evidence of infection  Strength:  Lower extremities are 5/5 grossly  Otherwise neurologically intact    Assessment:  Active Hospital Problems    Diagnosis     Lumbar stenosis with neurogenic claudication [M48.062]     Neurogenic claudication due to lumbar spinal stenosis [M48.062]      Added automatically from request for surgery [id number]      Spondylolisthesis, lumbar region [M43.16]      Added automatically from request for surgery [id number]       POD#1 S/p L3-5 TLIF  Chemoprophylaxis: no    Plan:  1. Ambulate with PT/OT as tolerated - LSO when OOB and active  2. Advance diet as tolerated  3. D/c PCA - advance orals  4. Continue IV fluids until this evening  5. Add salt tabs with meals  6. Continue hemovac to suction and on-q open  7. Hold oral hypertensive medications for SBP <120  8. Aim for home Wednesday if stable   "

## 2022-11-01 NOTE — PROGRESS NOTES
Pt received from OR s/p L3-L5 fusion. DSG CDI. Neuro's intact. Patient denies numbness, tingling. BP a little low at 92/49, bolus administered per orders. Patient endorsing 2/10 back discomfort. Medicated with tylenol & started on PCA. Bed in lowest position. Hourly rounding completed.

## 2022-11-01 NOTE — THERAPY
"Physical Therapy   Initial Evaluation     Patient Name: Michelle Mckeon  Age:  86 y.o., Sex:  female  Medical Record #: [id number]  Today's Date: 11/1/2022     Precautions  Precautions: Spinal / Back Precautions ;Fall Risk;Lumbosacral Orthosis  Comments: LSO when OOB > 5 min    Assessment  Ms. Mckeon is an 85 y/o female who presents to acute secondary to elective spine surgery that included L3-5 TLIF, laminectomies, foraminotomies, and decompression. Provided pt with spinal precaution handout and reviewed, pt demonstrated understanding. Verbal cues to sequence log roll technique, able to come to EOB without assist. Noted pain pump drain to be disconnected, contacted RN who came to examine. Pt reported feeling dizziness at EOB that she states was worse than earlier. BP 90/47 mmHg. Performed one stand with CGA for safety to move up HOB, then was returned to supine. Reviewed glute sets and heel slides to improve blood flow and assist with pain while dealing with hypotension. Pt very motivated to mobilize, only limited by hypotension. Anticipate once this is resolved no further acute PT needs.    Plan    Recommend Physical Therapy 5 times per week until therapy goals are met for the following treatments:  Bed Mobility, Gait Training, Therapeutic Activities, and Therapeutic Exercises    DC Equipment Recommendations: None  Discharge Recommendations: Recommend outpatient physical therapy services to address higher level deficits       Subjective    \"Walking makes me feel the best\"     Objective       11/01/22 0901   Prior Living Situation   Prior Services None   Housing / Facility 1 Story House   Steps Into Home 0   Equipment Owned Single Point Cane;Front-Wheel Walker   Lives with - Patient's Self Care Capacity Adult Children   Comments reports lives with her daughter and roommate   Prior Level of Functional Mobility   Bed Mobility Independent   Transfer Status Independent   Ambulation Independent   Distance " Ambulation (Feet)   (community ambulator)   Assistive Devices Used Single Point Cane   Cognition    Level of Consciousness Alert   Comments very pleasant and motivated   Passive ROM Lower Body   Passive ROM Lower Body WDL   Active ROM Lower Body    Active ROM Lower Body  WDL   Strength Lower Body   Lower Body Strength  X   Comments R LE WFL. L hip flex/abd/add 3/5, remaining WFL   Sensation Lower Body   Lower Extremity Sensation   WDL   Balance Assessment   Sitting Balance (Static) Fair   Sitting Balance (Dynamic) Fair   Weight Shift Sitting Fair   Comments unable to stand due to hypotension   Gait Analysis   Gait Level Of Assist Contact Guard Assist   Assistive Device Hand Held Assist   Distance (Feet) 2   Deviation Bradykinetic   Weight Bearing Status no restrictions   Bed Mobility    Supine to Sit Supervised   Sit to Supine Supervised   Scooting Supervised   Functional Mobility   Sit to Stand Contact Guard Assist   How much difficulty does the patient currently have...   Turning over in bed (including adjusting bedclothes, sheets and blankets)? 3   Sitting down on and standing up from a chair with arms (e.g., wheelchair, bedside commode, etc.) 3   Moving from lying on back to sitting on the side of the bed? 3   How much help from another person does the patient currently need...   Moving to and from a bed to a chair (including a wheelchair)? 3   Need to walk in a hospital room? 3   Climbing 3-5 steps with a railing? 3   6 clicks Mobility Score 18   Short Term Goals    Short Term Goal # 1 Pt will perform functional transfers with SPV in 6 visits to increase independence   Short Term Goal # 2 Pt will ambulate 150ft with FWW and SPV in 6 vistis to increase independence

## 2022-11-01 NOTE — DISCHARGE PLANNING
Care Transition Team Assessment    Met with patient at bedside, discussed plan for discharge home tomorrow. Her daughter Nunu will drive and take a few days off work to help out, room mate is also able to assist as needed.     Information Source  Orientation Level: Oriented X4  Interdisciplinary Discharge Planning  Lives with - Patient's Self Care Capacity: Adult Children  Patient or legal guardian wants to designate a caregiver: No  Support Systems: Family Member(s)  Housing / Facility: 1 Story House, ramp outside, 1-2 steps inside.   Able to Return to Previous ADL's: Future Time w/Therapy  Mobility Issues: No  Prior Services: Intermittent Physical Support for ADL Per Service  Assistance Needed: Yes  Durable Medical Equipment: Walker (cane)    Discharge Preparedness  What is your plan after discharge?: Home with help  What are your discharge supports?: (Daughter and roommate)  Prior Functional Level: Independent with Activities of Daily Living  Difficulity with ADLs: None  Difficulity with IADLs: Shopping, Driving    Functional Assesment  Prior Functional Level: Independent with Activities of Daily Living, ambulates WO AD.   Vision / Hearing Impairment  Hearing Impairment: Both Ears, Hearing Device Not Available  Domestic Abuse  Physical Abuse or Sexual Abuse: No  Verbal Abuse or Emotional Abuse: No  Possible Abuse/Neglect Reported to:: Not Applicable  Anticipated Discharge Information  Discharge Disposition: Discharged to home/self care (01)  Discharge Address: [address] [facility] [address]

## 2022-11-02 LAB
ANION GAP SERPL CALC-SCNC: 7 MMOL/L (ref 7–16)
APTT PPP: 32.5 SEC (ref 24.7–36)
BUN SERPL-MCNC: 13 MG/DL (ref 8–22)
CALCIUM SERPL-MCNC: 7.6 MG/DL (ref 8.5–10.5)
CHLORIDE SERPL-SCNC: 106 MMOL/L (ref 96–112)
CO2 SERPL-SCNC: 22 MMOL/L (ref 20–33)
CREAT SERPL-MCNC: 0.96 MG/DL (ref 0.5–1.4)
ERYTHROCYTE [DISTWIDTH] IN BLOOD BY AUTOMATED COUNT: 55.8 FL (ref 35.9–50)
GFR SERPLBLD CREATININE-BSD FMLA CKD-EPI: 58 ML/MIN/1.73 M 2
GLUCOSE SERPL-MCNC: 128 MG/DL (ref 65–99)
HCT VFR BLD AUTO: 31.4 % (ref 37–47)
HGB BLD-MCNC: 10.2 G/DL (ref 12–16)
INR PPP: 1.24 (ref 0.87–1.13)
MCH RBC QN AUTO: 28.7 PG (ref 27–33)
MCHC RBC AUTO-ENTMCNC: 32.5 G/DL (ref 33.6–35)
MCV RBC AUTO: 88.2 FL (ref 81.4–97.8)
PLATELET # BLD AUTO: 207 K/UL (ref 164–446)
PMV BLD AUTO: 10.1 FL (ref 9–12.9)
POTASSIUM SERPL-SCNC: 4.7 MMOL/L (ref 3.6–5.5)
PROTHROMBIN TIME: 15.4 SEC (ref 12–14.6)
RBC # BLD AUTO: 3.56 M/UL (ref 4.2–5.4)
SODIUM SERPL-SCNC: 135 MMOL/L (ref 135–145)
WBC # BLD AUTO: 17 K/UL (ref 4.8–10.8)

## 2022-11-02 PROCEDURE — A9270 NON-COVERED ITEM OR SERVICE: HCPCS | Performed by: PHYSICIAN ASSISTANT

## 2022-11-02 PROCEDURE — 80048 BASIC METABOLIC PNL TOTAL CA: CPT

## 2022-11-02 PROCEDURE — 97165 OT EVAL LOW COMPLEX 30 MIN: CPT

## 2022-11-02 PROCEDURE — 99024 POSTOP FOLLOW-UP VISIT: CPT | Performed by: PHYSICIAN ASSISTANT

## 2022-11-02 PROCEDURE — 85730 THROMBOPLASTIN TIME PARTIAL: CPT

## 2022-11-02 PROCEDURE — 97530 THERAPEUTIC ACTIVITIES: CPT | Mod: CQ

## 2022-11-02 PROCEDURE — 700102 HCHG RX REV CODE 250 W/ 637 OVERRIDE(OP): Performed by: PHYSICIAN ASSISTANT

## 2022-11-02 PROCEDURE — 770001 HCHG ROOM/CARE - MED/SURG/GYN PRIV*

## 2022-11-02 PROCEDURE — 85027 COMPLETE CBC AUTOMATED: CPT

## 2022-11-02 PROCEDURE — A9270 NON-COVERED ITEM OR SERVICE: HCPCS | Performed by: NEUROLOGICAL SURGERY

## 2022-11-02 PROCEDURE — 700102 HCHG RX REV CODE 250 W/ 637 OVERRIDE(OP): Performed by: NEUROLOGICAL SURGERY

## 2022-11-02 PROCEDURE — 97116 GAIT TRAINING THERAPY: CPT | Mod: CQ

## 2022-11-02 PROCEDURE — 97535 SELF CARE MNGMENT TRAINING: CPT

## 2022-11-02 PROCEDURE — 85610 PROTHROMBIN TIME: CPT

## 2022-11-02 RX ORDER — CEPHALEXIN 500 MG/1
500 CAPSULE ORAL EVERY 8 HOURS
Status: DISCONTINUED | OUTPATIENT
Start: 2022-11-02 | End: 2022-11-03 | Stop reason: HOSPADM

## 2022-11-02 RX ORDER — CEPHALEXIN 500 MG/1
500 CAPSULE ORAL 4 TIMES DAILY
Status: DISCONTINUED | OUTPATIENT
Start: 2022-11-02 | End: 2022-11-02

## 2022-11-02 RX ADMIN — SODIUM CHLORIDE 1 G: 1 TABLET ORAL at 16:34

## 2022-11-02 RX ADMIN — CEPHALEXIN 500 MG: 500 CAPSULE ORAL at 09:21

## 2022-11-02 RX ADMIN — CEPHALEXIN 500 MG: 500 CAPSULE ORAL at 22:20

## 2022-11-02 RX ADMIN — LEVOTHYROXINE SODIUM 75 MCG: 0.07 TABLET ORAL at 06:06

## 2022-11-02 RX ADMIN — CEPHALEXIN 500 MG: 500 CAPSULE ORAL at 16:34

## 2022-11-02 RX ADMIN — Medication 1 APPLICATOR: at 16:34

## 2022-11-02 RX ADMIN — POLYETHYLENE GLYCOL 3350 1 PACKET: 17 POWDER, FOR SOLUTION ORAL at 11:06

## 2022-11-02 RX ADMIN — DOCUSATE SODIUM 100 MG: 100 CAPSULE, LIQUID FILLED ORAL at 16:33

## 2022-11-02 RX ADMIN — Medication 1 APPLICATOR: at 06:06

## 2022-11-02 RX ADMIN — ACETAMINOPHEN 1000 MG: 500 TABLET ORAL at 06:05

## 2022-11-02 RX ADMIN — TIZANIDINE 4 MG: 4 TABLET ORAL at 14:32

## 2022-11-02 RX ADMIN — TIZANIDINE 4 MG: 4 TABLET ORAL at 22:20

## 2022-11-02 RX ADMIN — SODIUM CHLORIDE 1 G: 1 TABLET ORAL at 11:26

## 2022-11-02 RX ADMIN — SODIUM CHLORIDE 1 G: 1 TABLET ORAL at 09:21

## 2022-11-02 RX ADMIN — DOCUSATE SODIUM 100 MG: 100 CAPSULE, LIQUID FILLED ORAL at 06:05

## 2022-11-02 RX ADMIN — OXYCODONE 2.5 MG: 5 TABLET ORAL at 04:40

## 2022-11-02 RX ADMIN — ACETAMINOPHEN 1000 MG: 500 TABLET ORAL at 16:34

## 2022-11-02 RX ADMIN — SENNOSIDES AND DOCUSATE SODIUM 1 TABLET: 50; 8.6 TABLET ORAL at 22:20

## 2022-11-02 ASSESSMENT — GAIT ASSESSMENTS
DEVIATION: BRADYKINETIC
GAIT LEVEL OF ASSIST: STANDBY ASSIST
ASSISTIVE DEVICE: FRONT WHEEL WALKER
DISTANCE (FEET): 14

## 2022-11-02 ASSESSMENT — COGNITIVE AND FUNCTIONAL STATUS - GENERAL
MOBILITY SCORE: 18
MOVING FROM LYING ON BACK TO SITTING ON SIDE OF FLAT BED: A LITTLE
WALKING IN HOSPITAL ROOM: A LITTLE
DAILY ACTIVITIY SCORE: 24
STANDING UP FROM CHAIR USING ARMS: A LITTLE
MOVING TO AND FROM BED TO CHAIR: A LITTLE
SUGGESTED CMS G CODE MODIFIER MOBILITY: CK
CLIMB 3 TO 5 STEPS WITH RAILING: A LITTLE
TURNING FROM BACK TO SIDE WHILE IN FLAT BAD: A LITTLE
SUGGESTED CMS G CODE MODIFIER DAILY ACTIVITY: CH

## 2022-11-02 ASSESSMENT — PAIN DESCRIPTION - PAIN TYPE
TYPE: ACUTE PAIN;SURGICAL PAIN
TYPE: ACUTE PAIN;SURGICAL PAIN

## 2022-11-02 ASSESSMENT — ACTIVITIES OF DAILY LIVING (ADL): TOILETING: INDEPENDENT

## 2022-11-02 NOTE — PROGRESS NOTES
2020 pt off unit via wheelchair for xray    2037 pt returned to unit via wheelchair, assisted back to bed, no further needs at this time

## 2022-11-02 NOTE — THERAPY
Physical Therapy   Daily Treatment     Patient Name: Michelle Mckeon  Age:  86 y.o., Sex:  female  Medical Record #: [id number]  Today's Date: 11/2/2022     Precautions  Precautions: Spinal / Back Precautions ;Fall Risk;Lumbosacral Orthosis  Comments: LSO when OOB > 5 min    Assessment    Pt agreed to therapy, received sitting up at end of OT session. This therapist observed pt walking hallway distances with OT prior to session. Pt able to ascend/descend 2 stairs with SBA, walking 15ft x2 within room w/ FWW SPV. Pt preformed log roll from flat bed with SPV. Discussed car transfers, brace wear, and home environment to maximize safety. Will continue to follow.    Plan    Continue current treatment plan.    DC Equipment Recommendations: Front-Wheel Walker  Discharge Recommendations: Recommend outpatient physical therapy services to address higher level deficits       11/02/22 0919   Other Treatments   Other Treatments Provided discussed home set up to maximize safety and discussed car transfer. Pt able to recall spinal precautions   Balance   Sitting Balance (Static) Fair   Sitting Balance (Dynamic) Fair   Standing Balance (Static) Fair   Standing Balance (Dynamic) Fair   Weight Shift Sitting Fair   Weight Shift Standing Fair   Skilled Intervention Verbal Cuing;Sequencing   Comments w/ FWW   Gait Analysis   Gait Level Of Assist Standby Assist   Assistive Device Front Wheel Walker   Distance (Feet) 14   # of Times Distance was Traveled 2   Deviation Bradykinetic   # of Stairs Climbed 2   Level of Assist with Stairs Standby Assist   Weight Bearing Status no restrictions   Skilled Intervention Verbal Cuing;Tactile Cuing;Sequencing   Comments pt demo'd step through pattern on stairs using hand rails. Pt observed walking hallway distances with OT prior to session.   Bed Mobility    Supine to Sit Supervised   Sit to Supine Supervised   Scooting Supervised   Rolling Supervised   Skilled Intervention Sequencing;Verbal Cuing    Comments log roll HOB flat   Functional Mobility   Sit to Stand Supervised   Bed, Chair, Wheelchair Transfer Supervised   Transfer Method Stand Step   Mobility walking in room and stairs   Skilled Intervention Verbal Cuing;Sequencing   Comments pt demo'd good hand placement for transfers

## 2022-11-02 NOTE — PROGRESS NOTES
"Neurosurgery  POD# 2  Ambulating  Voiding  Tolerating oral medications  Denies nausea, vomiting  Pain controlled on current medication regimen  Leg symptoms improved  Some dizziness yesterday with associated hypertension when attempting to mobilize.  Limited PT/OT assessment yesterday, appreciate repeat evaluation today in anticipation of discharge to home this afternoon versus tomorrow morning.    Objective:  /56   Pulse 85   Temp 36.6 °C (97.8 °F) (Temporal)   Resp 15   Ht 1.575 m (5' 2\")   Wt 63.1 kg (139 lb 1.8 oz)   SpO2 93%     Intake/Output Summary (Last 24 hours) at 11/1/2022 0748  Last data filed at 11/1/2022 0425  Gross per 24 hour   Intake 3100 ml   Output 1785 ml   Net 1315 ml       Recent Labs     11/01/22  0410 11/02/22  0549   WBC 20.8* 17.0*   RBC 3.83* 3.56*   HEMOGLOBIN 11.0* 10.2*   HEMATOCRIT 32.8* 31.4*   MCV 85.6 88.2   MCH 28.7 28.7   MCHC 33.5* 32.5*   RDW 51.5* 55.8*   PLATELETCT 248 207   MPV 10.3 10.1     Recent Labs     11/01/22  0410 11/02/22  0549   SODIUM 132* 135   POTASSIUM 4.8 4.7   CHLORIDE 104 106   CO2 21 22   GLUCOSE 127* 128*   BUN 14 13     Recent Labs     11/01/22  0410 11/02/22  0549   APTT 25.4 32.5   INR 1.16* 1.24*     Hypotensive intermittently, otherwise VSS  Hgb 10.2, WBC 17.0,  - will continue to watch blood levels  Hemovacs 30 and 70cc/8hr am  Surgical incision clean, dry, intact, no evidence of infection  Strength:  Lower extremities are 5/5 grossly  Otherwise neurologically intact    Assessment:  Active Hospital Problems    Diagnosis     Lumbar stenosis with neurogenic claudication [M48.062]     Neurogenic claudication due to lumbar spinal stenosis [M48.062]      Added automatically from request for surgery [id number]      Spondylolisthesis, lumbar region [M43.16]      Added automatically from request for surgery [id number]       POD#2 S/p L3-5 TLIF  Chemoprophylaxis: no    Plan:  1. Ambulate with PT/OT as tolerated - LSO when OOB and active  2.  Repeat " PT and OT assessments today  3.  Watch Hemovac output midday, will make plan at that time for either discharge this afternoon/evening versus Thursday morning

## 2022-11-02 NOTE — CARE PLAN
The patient is Stable - Low risk of patient condition declining or worsening    Shift Goals  Clinical Goals: Mobilize, void, stable BP  Patient Goals: Pain control and rest  Family Goals: ALEXANDRA    Progress made toward(s) clinical / shift goals:    Problem: Pain - Standard  Goal: Alleviation of pain or a reduction in pain to the patient’s comfort goal  Outcome: Progressing  Note: Pt educated on pain scale and orders for pain medication. Pt educated on non-pharmacologic interventions and encouraged to mobilize.      Problem: Knowledge Deficit - Standard  Goal: Patient and family/care givers will demonstrate understanding of plan of care, disease process/condition, diagnostic tests and medications  Outcome: Progressing  Note: Updated on POC and educated on the healing process following her spinal surgery, including use of IS, mobilization, and increasing nutritional intake.       Patient is not progressing towards the following goals:    N/A

## 2022-11-02 NOTE — CARE PLAN
The patient is Stable - Low risk of patient condition declining or worsening    Shift Goals  Clinical Goals: Mobility, drain output, Pain control, safety  Patient Goals: Ret  Family Goals: ALEXANDRA    Progress made toward(s) clinical / shift goals:      Problem: Pain - Standard  Goal: Alleviation of pain or a reduction in pain to the patient’s comfort goal  Note: Rating pain 2/10. Declines interventions at this time     Problem: Knowledge Deficit - Standard  Goal: Patient and family/care givers will demonstrate understanding of plan of care, disease process/condition, diagnostic tests and medications  Note: POC discussed with patient. Pending drain output.        Patient is not progressing towards the following goals:

## 2022-11-02 NOTE — CARE PLAN
Problem: Pain - Standard  Goal: Alleviation of pain or a reduction in pain to the patient’s comfort goal  Outcome: Progressing  Note: Pain managed with medication and rest     Problem: Knowledge Deficit - Standard  Goal: Patient and family/care givers will demonstrate understanding of plan of care, disease process/condition, diagnostic tests and medications  Outcome: Progressing  Note: POC discussed with pt-pt verbalized understanding   The patient is Stable - Low risk of patient condition declining or worsening    Shift Goals  Clinical Goals: stable BP, mobility, xray  Patient Goals: rest, pain control  Family Goals: ALEXANDRA    Progress made toward(s) clinical / shift goals:      Patient is not progressing towards the following goals:

## 2022-11-02 NOTE — THERAPY
Occupational Therapy   Initial Evaluation     Patient Name: Michelle Mckeon  Age:  86 y.o., Sex:  female  Medical Record #: [id number]  Today's Date: 11/2/2022     Precautions: Spinal / Back Precautions , Fall Risk, Lumbosacral Orthosis  Comments: LSO when OOB > 5 min    Assessment  Patient is 86 year old female  seen s/p L3-5 TLIF for OT evaluation. Pt demonstrated supervision level for low body dressing, standing G/H, and ADLtxfs with mod verbal cues for spinal precautions. Provided education to pt regarding ADLs while maintaining neutral spine position, compensatory strategies for IADLs, pacing of activities, and recommended AD. Pt receptive and verbalized understanding and agreement. Daughter able to assist during post op recovery period. No further OT needs indicated at this time.     Plan    Recommend Occupational Therapy for Evaluation only     DC Equipment Recommendations: (P) None  Discharge Recommendations: (P) Anticipate that the patient will have no further occupational therapy needs after discharge from the hospital      11/02/22 0901   Prior Living Situation   Housing / Facility 1 Story House   Bathroom Set up Walk In Shower;Grab Bars   Equipment Owned Single Point Cane;Front-Wheel Walker   Lives with - Patient's Self Care Capacity Adult Children   Comments Dtr and roomate can assist   Prior Level of ADL Function   Self Feeding Independent   Grooming / Hygiene Independent   Bathing Independent   Dressing Independent   Toileting Independent   Prior Level of IADL Function   Medication Management Independent   Laundry Independent   Kitchen Mobility Independent   Finances Independent   Home Management Independent   Precautions   Precautions Spinal / Back Precautions ;Lumbosacral Orthosis   Cognition    Cognition / Consciousness WDL   Comments pleasant and motivated   Active ROM Upper Body   Active ROM Upper Body  WDL   Strength Upper Body   Upper Body Strength  WDL   Balance Assessment   Sitting Balance  (Static) Good   Sitting Balance (Dynamic) Good   Standing Balance (Static) Fair   Standing Balance (Dynamic) Fair   Weight Shift Sitting Good   Weight Shift Standing Good   Bed Mobility    Supine to Sit Supervised   Sit to Supine Supervised   Scooting Supervised   Rolling Supervised   ADL Assessment   Grooming Supervision;Standing   Upper Body Dressing Supervision   Lower Body Dressing Supervision   Toileting Supervision   How much help from another person does the patient currently need...   6 Clicks Daily Activity Score 24   Functional Mobility   Sit to Stand Supervised   Bed, Chair, Wheelchair Transfer Supervised   Toilet Transfers Supervised   Mobility room and hallway distances   Patient / Family Goals   Patient / Family Goal #1 to go home   Education Group   Education Provided Back Safety   Role of Occupational Therapist Patient Response Patient;Acceptance;Explanation   Back Safety Patient Response Patient;Acceptance;Explanation;Handout   Anticipated Discharge Equipment and Recommendations   DC Equipment Recommendations None   Discharge Recommendations Anticipate that the patient will have no further occupational therapy needs after discharge from the hospital

## 2022-11-03 VITALS
HEIGHT: 62 IN | HEART RATE: 77 BPM | WEIGHT: 139.11 LBS | RESPIRATION RATE: 16 BRPM | OXYGEN SATURATION: 93 % | DIASTOLIC BLOOD PRESSURE: 56 MMHG | SYSTOLIC BLOOD PRESSURE: 122 MMHG | BODY MASS INDEX: 25.6 KG/M2 | TEMPERATURE: 98.1 F

## 2022-11-03 PROCEDURE — 700102 HCHG RX REV CODE 250 W/ 637 OVERRIDE(OP): Performed by: PHYSICIAN ASSISTANT

## 2022-11-03 PROCEDURE — A9270 NON-COVERED ITEM OR SERVICE: HCPCS | Performed by: NEUROLOGICAL SURGERY

## 2022-11-03 PROCEDURE — 700102 HCHG RX REV CODE 250 W/ 637 OVERRIDE(OP): Performed by: NEUROLOGICAL SURGERY

## 2022-11-03 PROCEDURE — A9270 NON-COVERED ITEM OR SERVICE: HCPCS | Performed by: PHYSICIAN ASSISTANT

## 2022-11-03 PROCEDURE — 99024 POSTOP FOLLOW-UP VISIT: CPT | Performed by: PHYSICIAN ASSISTANT

## 2022-11-03 PROCEDURE — 97116 GAIT TRAINING THERAPY: CPT | Mod: CQ

## 2022-11-03 RX ORDER — ACETAMINOPHEN 500 MG
1000 TABLET ORAL EVERY 6 HOURS
Qty: 100 TABLET | Refills: 0 | Status: SHIPPED | OUTPATIENT
Start: 2022-11-03 | End: 2023-04-03

## 2022-11-03 RX ORDER — TIZANIDINE 4 MG/1
2-4 TABLET ORAL 3 TIMES DAILY PRN
Qty: 90 TABLET | Refills: 3 | Status: SHIPPED | OUTPATIENT
Start: 2022-11-03 | End: 2023-02-03

## 2022-11-03 RX ORDER — CEPHALEXIN 500 MG/1
500 CAPSULE ORAL EVERY 8 HOURS
Qty: 12 CAPSULE | Refills: 0 | Status: SHIPPED | OUTPATIENT
Start: 2022-11-03 | End: 2022-11-07

## 2022-11-03 RX ORDER — OXYCODONE HYDROCHLORIDE 5 MG/1
2.5-5 TABLET ORAL EVERY 4 HOURS PRN
Qty: 42 TABLET | Refills: 0 | Status: SHIPPED | OUTPATIENT
Start: 2022-11-03 | End: 2022-11-10

## 2022-11-03 RX ADMIN — CEPHALEXIN 500 MG: 500 CAPSULE ORAL at 05:07

## 2022-11-03 RX ADMIN — ACETAMINOPHEN 1000 MG: 500 TABLET ORAL at 11:50

## 2022-11-03 RX ADMIN — Medication 1 APPLICATOR: at 05:07

## 2022-11-03 RX ADMIN — LEVOTHYROXINE SODIUM 50 MCG: 0.05 TABLET ORAL at 05:08

## 2022-11-03 RX ADMIN — SODIUM CHLORIDE 1 G: 1 TABLET ORAL at 08:12

## 2022-11-03 RX ADMIN — CEPHALEXIN 500 MG: 500 CAPSULE ORAL at 11:50

## 2022-11-03 RX ADMIN — ACETAMINOPHEN 1000 MG: 500 TABLET ORAL at 05:07

## 2022-11-03 RX ADMIN — DOCUSATE SODIUM 100 MG: 100 CAPSULE, LIQUID FILLED ORAL at 05:08

## 2022-11-03 RX ADMIN — TIZANIDINE 4 MG: 4 TABLET ORAL at 11:50

## 2022-11-03 RX ADMIN — HYDROCHLOROTHIAZIDE 12.5 MG: 12.5 TABLET ORAL at 05:07

## 2022-11-03 RX ADMIN — LISINOPRIL 20 MG: 20 TABLET ORAL at 05:07

## 2022-11-03 ASSESSMENT — PAIN DESCRIPTION - PAIN TYPE: TYPE: ACUTE PAIN;SURGICAL PAIN

## 2022-11-03 ASSESSMENT — COGNITIVE AND FUNCTIONAL STATUS - GENERAL
MOVING TO AND FROM BED TO CHAIR: A LITTLE
WALKING IN HOSPITAL ROOM: A LITTLE
MOVING FROM LYING ON BACK TO SITTING ON SIDE OF FLAT BED: A LITTLE
TURNING FROM BACK TO SIDE WHILE IN FLAT BAD: A LITTLE
MOBILITY SCORE: 18
CLIMB 3 TO 5 STEPS WITH RAILING: A LITTLE
SUGGESTED CMS G CODE MODIFIER MOBILITY: CK
STANDING UP FROM CHAIR USING ARMS: A LITTLE

## 2022-11-03 ASSESSMENT — GAIT ASSESSMENTS
ASSISTIVE DEVICE: FRONT WHEEL WALKER
DISTANCE (FEET): 400
GAIT LEVEL OF ASSIST: SUPERVISED

## 2022-11-03 NOTE — PROGRESS NOTES
1230 arrive to dc lounge via wheelchair. A/O, dc instructions reviewed w/ pt and relative. Verbalized understanding.

## 2022-11-03 NOTE — FACE TO FACE
Face to Face Note  -  Durable Medical Equipment    Ferny Garcia P.A.-C. - NPI: [id number]  I certify that this patient is under my care and that they had a durable medical equipment(DME)face to face encounter by myself that meets the physician DME face-to-face encounter requirements with this patient on:    Date of encounter:   Patient:                    MRN:                       YOB: 2021  Michelle Valadezford  [id number]  1935     The encounter with the patient was in whole, or in part, for the following medical condition, which is the primary reason for durable medical equipment:  Post-Op Surgery    I certify that, based on my findings, the following durable medical equipment is medically necessary:    Walkers.    My Clinical findings support the need for the above equipment due to:  Abnormal Gait, Wound/Incision

## 2022-11-03 NOTE — DISCHARGE INSTRUCTIONS
Incision Care, Adult  An incision is a surgical cut that is made through your skin. Most incisions are closed after surgery. Your incision may be closed with stitches (sutures), staples, skin glue, or adhesive strips. You may need to return to your health care provider to have sutures or staples removed. This may occur several days to several weeks after your surgery. The incision needs to be cared for properly to prevent infection.  How to care for your incision  Incision care    Follow instructions from your health care provider about how to take care of your incision. Make sure you:  Wash your hands with soap and water before you change the bandage (dressing). If soap and water are not available, use hand .  Change your dressing as told by your health care provider.  Leave sutures, skin glue, or adhesive strips in place. These skin closures may need to stay in place for 2 weeks or longer. If adhesive strip edges start to loosen and curl up, you may trim the loose edges. Do not remove adhesive strips completely unless your health care provider tells you to do that.  Check your incision area every day for signs of infection. Check for:  More redness, swelling, or pain.  More fluid or blood.  Warmth.  Pus or a bad smell.  Ask your health care provider how to clean the incision. This may include:  Using mild soap and water.  Using a clean towel to pat the incision dry after cleaning it.  Applying a cream or ointment. Do this only as told by your health care provider.  Covering the incision with a clean dressing.  Ask your health care provider when you can leave the incision uncovered.  Do not take baths, swim, or use a hot tub until your health care provider approves. Ask your health care provider if you can take showers. You may only be allowed to take sponge baths for bathing.  Medicines  If you were prescribed an antibiotic medicine, cream, or ointment, take or apply the antibiotic as told by your health  care provider. Do not stop taking or applying the antibiotic even if your condition improves.  Take over-the-counter and prescription medicines only as told by your health care provider.  General instructions  Limit movement around your incision to improve healing.  Avoid straining, lifting, or exercise for the first month, or for as long as told by your health care provider.  Follow instructions from your health care provider about returning to your normal activities.  Ask your health care provider what activities are safe.  Protect your incision from the sun when you are outside for the first 6 months, or for as long as told by your health care provider. Apply sunscreen around the scar or cover it up.  Keep all follow-up visits as told by your health care provider. This is important.  Contact a health care provider if:  Your have more redness, swelling, or pain around the incision.  You have more fluid or blood coming from the incision.  Your incision feels warm to the touch.  You have pus or a bad smell coming from the incision.  You have a fever or shaking chills.  You are nauseous or you vomit.  You are dizzy.  Your sutures or staples come undone.  Get help right away if:  You have a red streak coming from your incision.  Your incision bleeds through the dressing and the bleeding does not stop with gentle pressure.  The edges of your incision open up and separate.  You have severe pain.  You have a rash.  You are confused.  You faint.  You have trouble breathing and a fast heartbeat.  This information is not intended to replace advice given to you by your health care provider. Make sure you discuss any questions you have with your health care provider.  Document Released: 07/07/2006 Document Revised: 12/20/2019 Document Reviewed: 07/05/2017  Elsevier Patient Education © 2020 [employer]    FOLLOW UP ITEMS POST DISCHARGE  Follow up with our office in 2, 6, and 12 weeks.  Report to ER with any  complications.  Call our office with any questions or concerns.  No anti-inflammatories for 3 months, no blood thinners for 2 weeks.   Clear to shower Thursday of this week, pat incision dry, no special creams or ointments.   Wound dressing removed prior to shower, this is to remain off  Avoid bending, lifting and twisting.   Walk 4-6 times per day as tolerated to help prevent blood clots.

## 2022-11-03 NOTE — PROGRESS NOTES
Discharge order obtained. 2 Hemovac's and 1 on-q pump removed per order set. FWW delivered to bedside. Medicated with tylenol, PRN zanaflex, and last dose of PO antibiotic. Educated patient that next dose of ABX is at bedtime. Patient educated on discharge instructions from provider. Order sent to discharge lounge. [place] [facility] [place] All personal belongings with patient upon leaving unit.

## 2022-11-03 NOTE — THERAPY
Physical Therapy   Daily Treatment     Patient Name: Michelle Mckeon  Age:  86 y.o., Sex:  female  Medical Record #: [id number]  Today's Date: 11/3/2022     Precautions  Precautions: Spinal / Back Precautions ;Fall Risk;Lumbosacral Orthosis  Comments: LSO when OOB > 5min    Assessment    Pt greeted up in chair, agreeable to therapy. All mobility was at SPV level, pt ambulated 400 ft w/ FWW. Pt demo'd step through pattern. Pt to be discharge from therapy secondary to goals met.     Plan    Discharge secondary to goals met.     DC Equipment Recommendations: Front-Wheel Walker  Discharge Recommendations: Recommend outpatient physical therapy services to address higher level deficits       11/03/22 0859   Balance   Sitting Balance (Static) Fair   Sitting Balance (Dynamic) Fair   Standing Balance (Static) Fair   Standing Balance (Dynamic) Fair   Weight Shift Sitting Fair   Weight Shift Standing Fair   Comments w/ FWW   Gait Analysis   Gait Level Of Assist Supervised   Assistive Device Front Wheel Walker   Distance (Feet) 400   # of Times Distance was Traveled 1   Deviation   (step through pattern)   Weight Bearing Status no restrictions   Skilled Intervention Verbal Cuing   Comments verbal cues for posture   Bed Mobility    Comments pt up in chair pre/post therapy   Functional Mobility   Sit to Stand Supervised   Toilet Transfers Supervised   Transfer Method Stand Step   Mobility chair > toilet > hallway > chair   Skilled Intervention Verbal Cuing   Short Term Goals    Short Term Goal # 1 Pt will perform functional transfers with SPV in 6 visits to increase independence   Goal Outcome # 1 Goal met   Short Term Goal # 2 Pt will ambulate 150ft with FWW and SPV in 6 vistis to increase independence   Goal Outcome # 2 Goal met   Supervising Physical Therapist (PTA Treatments Only)   Supervising Physical Therapist Janet Raman

## 2022-11-03 NOTE — CARE PLAN
Problem: Pain - Standard  Goal: Alleviation of pain or a reduction in pain to the patient’s comfort goal  Outcome: Progressing  Note: Pain managed with medication and rest     Problem: Knowledge Deficit - Standard  Goal: Patient and family/care givers will demonstrate understanding of plan of care, disease process/condition, diagnostic tests and medications  Outcome: Progressing  Note: POC discussed with pt-pt verbalized understanding   The patient is Stable - Low risk of patient condition declining or worsening    Shift Goals  Clinical Goals: drain output, mobility  Patient Goals: rest, pain control  Family Goals: ALEXANDRA    Progress made toward(s) clinical / shift goals:      Patient is not progressing towards the following goals:

## 2022-11-03 NOTE — CARE PLAN
The patient is Stable - Low risk of patient condition declining or worsening    Shift Goals  Clinical Goals: drain output, mobility, go home  Patient Goals: rest, discharge today  Family Goals: ALEXANDRA    Progress made toward(s) clinical / shift goals:      Problem: Pain - Standard  Goal: Alleviation of pain or a reduction in pain to the patient’s comfort goal  Note: Denies pain at this time      Problem: Knowledge Deficit - Standard  Goal: Patient and family/care givers will demonstrate understanding of plan of care, disease process/condition, diagnostic tests and medications  Note: POC discussed. Patient discharging today        Patient is not progressing towards the following goals:

## 2022-11-03 NOTE — PROGRESS NOTES
"Neurosurgery  POD# 3  Seen in conjunction with Doctor Kashif today  Ambulating  Voiding  Tolerating oral medications  Denies nausea, vomiting  Pain controlled on current medication regimen  Leg symptoms improved  Hypotension improved  PT/OT assessment completed, no further needs    Objective:  /56   Pulse 77   Temp 36.7 °C (98.1 °F) (Temporal)   Resp 16   Ht 1.575 m (5' 2\")   Wt 63.1 kg (139 lb 1.8 oz)   SpO2 93%     Intake/Output Summary (Last 24 hours) at 11/1/2022 0748  Last data filed at 11/1/2022 0425  Gross per 24 hour   Intake 3100 ml   Output 1785 ml   Net 1315 ml       Recent Labs     11/01/22  0410 11/02/22  0549   WBC 20.8* 17.0*   RBC 3.83* 3.56*   HEMOGLOBIN 11.0* 10.2*   HEMATOCRIT 32.8* 31.4*   MCV 85.6 88.2   MCH 28.7 28.7   MCHC 33.5* 32.5*   RDW 51.5* 55.8*   PLATELETCT 248 207   MPV 10.3 10.1     Recent Labs     11/01/22  0410 11/02/22  0549   SODIUM 132* 135   POTASSIUM 4.8 4.7   CHLORIDE 104 106   CO2 21 22   GLUCOSE 127* 128*   BUN 14 13     Recent Labs     11/01/22  0410 11/02/22  0549   APTT 25.4 32.5   INR 1.16* 1.24*     VSS  No new labs  Hemovacs 30 and 60cc/8hr am, clearing  Surgical incision clean, dry, intact, no evidence of infection  Strength:  Lower extremities are 5/5 grossly  Otherwise neurologically intact    Assessment:  Active Hospital Problems    Diagnosis     Lumbar stenosis with neurogenic claudication [M48.062]     Neurogenic claudication due to lumbar spinal stenosis [M48.062]      Added automatically from request for surgery [id number]      Spondylolisthesis, lumbar region [M43.16]      Added automatically from request for surgery [id number]       POD#3 S/p L3-5 TLIF  Chemoprophylaxis: no    Plan:  1. Ambulate as tolerated - LSO when OOB and active  2.  Remove hemovac and on-q pump at 1100 today. D/c to home at 1200.  Call with any issues  3.  Scripts sent to pharmacy for d/c  "

## 2022-11-03 NOTE — DISCHARGE SUMMARY
Discharge Summary    CHIEF COMPLAINT ON ADMISSION  No chief complaint on file.      Reason for Admission  Spinal stenosis, lumbar region wit*     Admission Date  10/31/2022    CODE STATUS  Full Code    HPI & HOSPITAL COURSE  8/16/2022  This is a very nice 86-year-old woman.  She describes symptoms for the last 8 to 9 months.  She gets classic claudicatory pain.  When she sitting she is good.  When she stands and walks he has classic claudicatory pain.  Is down back of both legs.  Right equals left.  The symptoms numbness and tingling.  When she walks it is worse.  When she stands for period of time as bad as well.  The bladder and bowel are okay.  If she sits down she feels better.    She has an epidural booked for next week.    She is taking 1 mg gabapentin at night.    She is participating physical therapy.    She has a daughter.  She has several people who live with her.  One of her friends was with her here today.    She has type 2 diabetes.    She has stage III kidney disease.    She has had both knees replaced.    She is retired.     9/27/2022  Patient returns.  Continues with bilateral leg pain.  The epidural made the left leg worse.  She stopped the gabapentin due to dizziness.  She is completed physical therapy.  She is requesting surgery which is reasonable.     11/3/2022  On postoperative day #3 she is doing quite well.  She reports improvement of her preoperative leg symptoms.  She continues with incisional site discomfort which is well controlled at this time with oral analgesics.  She is eating and drinking without complication.  She is mobilizing well.  She had episodes of hypotension with mild anemia.  This is improved today with normotension.  She is otherwise hemodynamically stable.  She was evaluated by PT and OT who felt she was safe to home in stable condition.  Based on the above information we deemed her safe for discharge today in stable condition.          No notes on file    Therefore, she  is discharged in fair and stable condition to home with close outpatient follow-up.    The patient met 2-midnight criteria for an inpatient stay at the time of discharge.    Discharge Date  11/3/2022     FOLLOW UP ITEMS POST DISCHARGE  Follow up with our office in 2, 6, and 12 weeks.  Report to ER with any complications.  Call our office with any questions or concerns.  No anti-inflammatories for 3 months, no blood thinners for 2 weeks.   Clear to shower Thursday of this week, pat incision dry, no special creams or ointments.   Wound dressing removed prior to shower, this is to remain off  Avoid bending, lifting and twisting.   Walk 4-6 times per day as tolerated to help prevent blood clots.     DISCHARGE DIAGNOSES  Principal Problem:    Lumbar stenosis with neurogenic claudication POA: Yes  Active Problems:    Neurogenic claudication due to lumbar spinal stenosis POA: Unknown      Overview: Added automatically from request for surgery [id number]    Spondylolisthesis, lumbar region POA: Unknown      Overview: Added automatically from request for surgery [id number]  Resolved Problems:    * No resolved hospital problems. *      FOLLOW UP  Future Appointments   Date Time Provider Department Center   11/14/2022 11:30 AM SHELLEY Gloria [facility]   12/15/2022 11:30 AM SHELLEY Gloria [facility]     No follow-up provider specified.    MEDICATIONS ON DISCHARGE     Medication List        START taking these medications        Instructions   cephALEXin 500 MG Caps  Commonly known as: KEFLEX   Take 1 Capsule by mouth every 8 hours for 4 days.  Dose: 500 mg     oxyCODONE immediate-release 5 MG Tabs  Commonly known as: ROXICODONE   Take 0.5-1 Tablets by mouth every four hours as needed for Severe Pain (postop pain) for up to 7 days.  Dose: 2.5-5 mg     tizanidine 4 MG Tabs  Commonly known as: ZANAFLEX   Take 0.5-1 Tablets by mouth 3 times a day as needed (muscle spasm).  Dose: 2-4 mg            CHANGE  how you take these medications        Instructions   * acetaminophen 500 MG Tabs  What changed: Another medication with the same name was added. Make sure you understand how and when to take each.  Commonly known as: TYLENOL   Take 1,000 mg by mouth every 6 hours as needed. Indications: Pain  Dose: 1,000 mg     * acetaminophen 500 MG Tabs  What changed: You were already taking a medication with the same name, and this prescription was added. Make sure you understand how and when to take each.  Commonly known as: TYLENOL   Take 2 Tablets by mouth every 6 hours.  Dose: 1,000 mg           * This list has 2 medication(s) that are the same as other medications prescribed for you. Read the directions carefully, and ask your doctor or other care provider to review them with you.                CONTINUE taking these medications        Instructions   alendronate 70 MG Tabs  Commonly known as: FOSAMAX   Take 70 mg by mouth every 7 days. On Wed  Dose: 70 mg     lisinopril-hydrochlorothiazide 20-12.5 MG per tablet  Commonly known as: PRINZIDE   Take 1 Tablet by mouth every day.  Dose: 1 Tablet     MAGNESIUM PO   Take 1 Capsule by mouth every day.  Dose: 1 Capsule            ASK your doctor about these medications        Instructions   * levothyroxine 75 MCG Tabs  Commonly known as: SYNTHROID   Take 1 Tab by mouth Every morning on an empty stomach. Take one tablet 5 days a week; T,W,F,S,S   Indications: Underactive Thyroid  Dose: 75 mcg     * levothyroxine 50 MCG Tabs  Commonly known as: SYNTHROID   Take 1 Tab by mouth Every morning on an empty stomach. Take 1 tab on Monday and Thursday  Indications: Underactive Thyroid  Dose: 50 mcg           * This list has 2 medication(s) that are the same as other medications prescribed for you. Read the directions carefully, and ask your doctor or other care provider to review them with you.                  Allergies  Allergies   Allergen Reactions    Sulfa Drugs Itching       DIET  Orders  Placed This Encounter   Procedures    Diet Order Diet: Regular     Standing Status:   Standing     Number of Occurrences:   1     Order Specific Question:   Diet:     Answer:   Regular [1]       ACTIVITY  As tolerated.  Weight bearing as tolerated    CONSULTATIONS  None    PROCEDURES  TITLE OF PROCEDURE:  L3 decompressive laminectomy and bilateral foraminotomies  L4 decompressive laminectomy and bilateral foraminotomies.  L5 decompressive laminectomy and bilateral foraminotomies.  (laminectomies for neural decompression, not just for implant placement)  Complete bilateral facetectomies at L4-L5 and L3-4  Bilateral Lee osteotomies to facilitate reduction and decompression of L3 and L4.  L3-L4-L5 segmental fixation using  pedicle screws and rods.  O-arm navigation for screw placement.  Transpedicular bilateral decompression, L3-4 and L4-5 using a left-sidedapproach.  Transforaminal bilateral decompression of the exiting nerve roots, L3-4 and L4-5  L3-4 and L4-L5 interbody fusion using local morcellized autograft and Infuse BMP   Placement of expandibleTitanium interbody cages at L3-4 and L4-5   L3-L4-L5 posterolateral fusion using infused BMP matrix MasterGraft and local morcellized autograft.  Microscopic magnification.  Open reduction of L3-4 and L4-L5 spondylolisthesis.  Insertion of paraspinal On-Q pain catheters.    LABORATORY  Lab Results   Component Value Date    SODIUM 135 11/02/2022    POTASSIUM 4.7 11/02/2022    CHLORIDE 106 11/02/2022    CO2 22 11/02/2022    GLUCOSE 128 (H) 11/02/2022    BUN 13 11/02/2022    CREATININE 0.96 11/02/2022        Lab Results   Component Value Date    WBC 17.0 (H) 11/02/2022    HEMOGLOBIN 10.2 (L) 11/02/2022    HEMATOCRIT 31.4 (L) 11/02/2022    PLATELETCT 207 11/02/2022        Total time of the discharge process exceeds 30 minutes.

## 2022-11-09 ENCOUNTER — PATIENT MESSAGE (OUTPATIENT)
Dept: HEALTH INFORMATION MANAGEMENT | Facility: OTHER | Age: 87
End: 2022-11-09

## 2022-11-24 ENCOUNTER — APPOINTMENT (OUTPATIENT)
Dept: RADIOLOGY | Facility: MEDICAL CENTER | Age: 87
End: 2022-11-24
Attending: EMERGENCY MEDICINE
Payer: MEDICARE

## 2022-11-24 ENCOUNTER — HOSPITAL ENCOUNTER (OUTPATIENT)
Facility: MEDICAL CENTER | Age: 87
End: 2022-11-25
Attending: EMERGENCY MEDICINE | Admitting: HOSPITALIST
Payer: MEDICARE

## 2022-11-24 DIAGNOSIS — I95.9 HYPOTENSION, UNSPECIFIED HYPOTENSION TYPE: ICD-10-CM

## 2022-11-24 DIAGNOSIS — D72.829 LEUKOCYTOSIS, UNSPECIFIED TYPE: ICD-10-CM

## 2022-11-24 PROBLEM — I10 PRIMARY HYPERTENSION: Status: ACTIVE | Noted: 2022-11-24

## 2022-11-24 LAB
ANION GAP SERPL CALC-SCNC: 11 MMOL/L (ref 7–16)
APPEARANCE UR: CLEAR
BACTERIA #/AREA URNS HPF: ABNORMAL /HPF
BASOPHILS # BLD AUTO: 1 % (ref 0–1.8)
BASOPHILS # BLD: 0.24 K/UL (ref 0–0.12)
BILIRUB UR QL STRIP.AUTO: NEGATIVE
BUN SERPL-MCNC: 26 MG/DL (ref 8–22)
CALCIUM SERPL-MCNC: 9 MG/DL (ref 8.4–10.2)
CHLORIDE SERPL-SCNC: 96 MMOL/L (ref 96–112)
CO2 SERPL-SCNC: 23 MMOL/L (ref 20–33)
COLOR UR: YELLOW
CREAT SERPL-MCNC: 1.31 MG/DL (ref 0.5–1.4)
EKG IMPRESSION: NORMAL
EOSINOPHIL # BLD AUTO: 0.24 K/UL (ref 0–0.51)
EOSINOPHIL NFR BLD: 1 % (ref 0–6.9)
EPI CELLS #/AREA URNS HPF: ABNORMAL /HPF
ERYTHROCYTE [DISTWIDTH] IN BLOOD BY AUTOMATED COUNT: 51.3 FL (ref 35.9–50)
GFR SERPLBLD CREATININE-BSD FMLA CKD-EPI: 40 ML/MIN/1.73 M 2
GLUCOSE SERPL-MCNC: 114 MG/DL (ref 65–99)
GLUCOSE UR STRIP.AUTO-MCNC: NEGATIVE MG/DL
HCT VFR BLD AUTO: 35.1 % (ref 37–47)
HGB BLD-MCNC: 11.7 G/DL (ref 12–16)
KETONES UR STRIP.AUTO-MCNC: NEGATIVE MG/DL
LACTATE SERPL-SCNC: 1.3 MMOL/L (ref 0.5–2)
LEUKOCYTE ESTERASE UR QL STRIP.AUTO: ABNORMAL
LYMPHOCYTES # BLD AUTO: 2.65 K/UL (ref 1–4.8)
LYMPHOCYTES NFR BLD: 11 % (ref 22–41)
MANUAL DIFF BLD: NORMAL
MCH RBC QN AUTO: 28.3 PG (ref 27–33)
MCHC RBC AUTO-ENTMCNC: 33.3 G/DL (ref 33.6–35)
MCV RBC AUTO: 84.8 FL (ref 81.4–97.8)
MICRO URNS: ABNORMAL
MONOCYTES # BLD AUTO: 4.1 K/UL (ref 0–0.85)
MONOCYTES NFR BLD AUTO: 17 % (ref 0–13.4)
MUCOUS THREADS #/AREA URNS HPF: ABNORMAL /HPF
MYELOCYTES NFR BLD MANUAL: 2 %
NEUTROPHILS # BLD AUTO: 16.39 K/UL (ref 2–7.15)
NEUTROPHILS NFR BLD: 64 % (ref 44–72)
NEUTS BAND NFR BLD MANUAL: 4 % (ref 0–10)
NITRITE UR QL STRIP.AUTO: NEGATIVE
NRBC # BLD AUTO: 0 K/UL
NRBC BLD-RTO: 0 /100 WBC
PH UR STRIP.AUTO: 6 [PH] (ref 5–8)
PLATELET # BLD AUTO: 380 K/UL (ref 164–446)
PLATELET BLD QL SMEAR: NORMAL
PMV BLD AUTO: 11.2 FL (ref 9–12.9)
POTASSIUM SERPL-SCNC: 4.1 MMOL/L (ref 3.6–5.5)
PROT UR QL STRIP: NEGATIVE MG/DL
RBC # BLD AUTO: 4.14 M/UL (ref 4.2–5.4)
RBC # URNS HPF: ABNORMAL /HPF
RBC BLD AUTO: NORMAL
RBC UR QL AUTO: NEGATIVE
SODIUM SERPL-SCNC: 130 MMOL/L (ref 135–145)
SP GR UR STRIP.AUTO: 1.01
TROPONIN T SERPL-MCNC: 35 NG/L (ref 6–19)
TSH SERPL DL<=0.005 MIU/L-ACNC: 0.9 UIU/ML (ref 0.38–5.33)
WBC # BLD AUTO: 24.1 K/UL (ref 4.8–10.8)
WBC #/AREA URNS HPF: ABNORMAL /HPF

## 2022-11-24 PROCEDURE — 36415 COLL VENOUS BLD VENIPUNCTURE: CPT

## 2022-11-24 PROCEDURE — 81001 URINALYSIS AUTO W/SCOPE: CPT

## 2022-11-24 PROCEDURE — 700111 HCHG RX REV CODE 636 W/ 250 OVERRIDE (IP): Performed by: EMERGENCY MEDICINE

## 2022-11-24 PROCEDURE — 84443 ASSAY THYROID STIM HORMONE: CPT

## 2022-11-24 PROCEDURE — 71045 X-RAY EXAM CHEST 1 VIEW: CPT

## 2022-11-24 PROCEDURE — 93005 ELECTROCARDIOGRAM TRACING: CPT | Performed by: EMERGENCY MEDICINE

## 2022-11-24 PROCEDURE — 99220 PR INITIAL OBSERVATION CARE,LEVL III: CPT | Performed by: HOSPITALIST

## 2022-11-24 PROCEDURE — 85007 BL SMEAR W/DIFF WBC COUNT: CPT

## 2022-11-24 PROCEDURE — 80048 BASIC METABOLIC PNL TOTAL CA: CPT

## 2022-11-24 PROCEDURE — 84484 ASSAY OF TROPONIN QUANT: CPT

## 2022-11-24 PROCEDURE — 700105 HCHG RX REV CODE 258: Performed by: EMERGENCY MEDICINE

## 2022-11-24 PROCEDURE — 87077 CULTURE AEROBIC IDENTIFY: CPT | Mod: 91

## 2022-11-24 PROCEDURE — G0378 HOSPITAL OBSERVATION PER HR: HCPCS

## 2022-11-24 PROCEDURE — 83605 ASSAY OF LACTIC ACID: CPT

## 2022-11-24 PROCEDURE — 99285 EMERGENCY DEPT VISIT HI MDM: CPT

## 2022-11-24 PROCEDURE — 700105 HCHG RX REV CODE 258: Performed by: HOSPITALIST

## 2022-11-24 PROCEDURE — 87040 BLOOD CULTURE FOR BACTERIA: CPT | Mod: 91

## 2022-11-24 PROCEDURE — 85025 COMPLETE CBC W/AUTO DIFF WBC: CPT

## 2022-11-24 PROCEDURE — 96374 THER/PROPH/DIAG INJ IV PUSH: CPT

## 2022-11-24 PROCEDURE — 94760 N-INVAS EAR/PLS OXIMETRY 1: CPT

## 2022-11-24 RX ORDER — SODIUM CHLORIDE 9 MG/ML
250 INJECTION, SOLUTION INTRAVENOUS ONCE
Status: COMPLETED | OUTPATIENT
Start: 2022-11-24 | End: 2022-11-24

## 2022-11-24 RX ORDER — AMOXICILLIN 250 MG
2 CAPSULE ORAL 2 TIMES DAILY
Status: DISCONTINUED | OUTPATIENT
Start: 2022-11-24 | End: 2022-11-25 | Stop reason: HOSPADM

## 2022-11-24 RX ORDER — SODIUM CHLORIDE 9 MG/ML
1000 INJECTION, SOLUTION INTRAVENOUS CONTINUOUS
Status: ACTIVE | OUTPATIENT
Start: 2022-11-24 | End: 2022-11-25

## 2022-11-24 RX ORDER — BISACODYL 10 MG
10 SUPPOSITORY, RECTAL RECTAL
Status: DISCONTINUED | OUTPATIENT
Start: 2022-11-24 | End: 2022-11-25 | Stop reason: HOSPADM

## 2022-11-24 RX ORDER — POLYETHYLENE GLYCOL 3350 17 G/17G
1 POWDER, FOR SOLUTION ORAL
Status: DISCONTINUED | OUTPATIENT
Start: 2022-11-24 | End: 2022-11-25 | Stop reason: HOSPADM

## 2022-11-24 RX ORDER — TIZANIDINE 4 MG/1
2 TABLET ORAL NIGHTLY PRN
Status: DISCONTINUED | OUTPATIENT
Start: 2022-11-24 | End: 2022-11-25 | Stop reason: HOSPADM

## 2022-11-24 RX ORDER — ACETAMINOPHEN 325 MG/1
650 TABLET ORAL EVERY 6 HOURS PRN
Status: DISCONTINUED | OUTPATIENT
Start: 2022-11-24 | End: 2022-11-25 | Stop reason: HOSPADM

## 2022-11-24 RX ORDER — LEVOTHYROXINE SODIUM 0.05 MG/1
50 TABLET ORAL
Status: DISCONTINUED | OUTPATIENT
Start: 2022-11-28 | End: 2022-11-25 | Stop reason: HOSPADM

## 2022-11-24 RX ORDER — LEVOTHYROXINE SODIUM 0.07 MG/1
75 TABLET ORAL
Status: DISCONTINUED | OUTPATIENT
Start: 2022-11-25 | End: 2022-11-25 | Stop reason: HOSPADM

## 2022-11-24 RX ORDER — CEFTRIAXONE 1 G/1
1000 INJECTION, POWDER, FOR SOLUTION INTRAMUSCULAR; INTRAVENOUS ONCE
Status: COMPLETED | OUTPATIENT
Start: 2022-11-24 | End: 2022-11-24

## 2022-11-24 RX ADMIN — CEFTRIAXONE SODIUM 1000 MG: 1 INJECTION, POWDER, FOR SOLUTION INTRAMUSCULAR; INTRAVENOUS at 21:20

## 2022-11-24 RX ADMIN — SODIUM CHLORIDE 1000 ML: 9 INJECTION, SOLUTION INTRAVENOUS at 22:25

## 2022-11-24 RX ADMIN — SODIUM CHLORIDE 250 ML: 9 INJECTION, SOLUTION INTRAVENOUS at 19:41

## 2022-11-24 ASSESSMENT — LIFESTYLE VARIABLES
ON A TYPICAL DAY WHEN YOU DRINK ALCOHOL HOW MANY DRINKS DO YOU HAVE: 0
EVER FELT BAD OR GUILTY ABOUT YOUR DRINKING: NO
TOTAL SCORE: 0
HOW MANY TIMES IN THE PAST YEAR HAVE YOU HAD 5 OR MORE DRINKS IN A DAY: 0
EVER HAD A DRINK FIRST THING IN THE MORNING TO STEADY YOUR NERVES TO GET RID OF A HANGOVER: NO
ALCOHOL_USE: NO
CONSUMPTION TOTAL: NEGATIVE
TOTAL SCORE: 0
HAVE YOU EVER FELT YOU SHOULD CUT DOWN ON YOUR DRINKING: NO
AVERAGE NUMBER OF DAYS PER WEEK YOU HAVE A DRINK CONTAINING ALCOHOL: 0
TOTAL SCORE: 0
HAVE PEOPLE ANNOYED YOU BY CRITICIZING YOUR DRINKING: NO

## 2022-11-24 ASSESSMENT — COGNITIVE AND FUNCTIONAL STATUS - GENERAL
STANDING UP FROM CHAIR USING ARMS: A LITTLE
HELP NEEDED FOR BATHING: A LITTLE
TOILETING: A LITTLE
SUGGESTED CMS G CODE MODIFIER MOBILITY: CJ
MOBILITY SCORE: 21
DAILY ACTIVITIY SCORE: 22
CLIMB 3 TO 5 STEPS WITH RAILING: A LITTLE
WALKING IN HOSPITAL ROOM: A LITTLE
SUGGESTED CMS G CODE MODIFIER DAILY ACTIVITY: CJ

## 2022-11-24 ASSESSMENT — FIBROSIS 4 INDEX
FIB4 SCORE: 1.29
FIB4 SCORE: 1.29

## 2022-11-24 ASSESSMENT — ENCOUNTER SYMPTOMS
EYE REDNESS: 0
FEVER: 0
FOCAL WEAKNESS: 0
DIZZINESS: 1
COUGH: 0
CHILLS: 0
VOMITING: 0
BRUISES/BLEEDS EASILY: 0
MYALGIAS: 0
STRIDOR: 0
FLANK PAIN: 0
SHORTNESS OF BREATH: 0
EYE DISCHARGE: 0
NERVOUS/ANXIOUS: 0
ABDOMINAL PAIN: 0

## 2022-11-24 ASSESSMENT — PATIENT HEALTH QUESTIONNAIRE - PHQ9
1. LITTLE INTEREST OR PLEASURE IN DOING THINGS: NOT AT ALL
2. FEELING DOWN, DEPRESSED, IRRITABLE, OR HOPELESS: NOT AT ALL
SUM OF ALL RESPONSES TO PHQ9 QUESTIONS 1 AND 2: 0

## 2022-11-24 ASSESSMENT — PAIN DESCRIPTION - PAIN TYPE: TYPE: ACUTE PAIN

## 2022-11-25 VITALS
HEART RATE: 86 BPM | BODY MASS INDEX: 24.6 KG/M2 | DIASTOLIC BLOOD PRESSURE: 46 MMHG | TEMPERATURE: 97.8 F | WEIGHT: 134.48 LBS | OXYGEN SATURATION: 95 % | RESPIRATION RATE: 18 BRPM | SYSTOLIC BLOOD PRESSURE: 127 MMHG

## 2022-11-25 LAB
ALBUMIN SERPL BCP-MCNC: 3.7 G/DL (ref 3.2–4.9)
ALBUMIN/GLOB SERPL: 1.8 G/DL
ALP SERPL-CCNC: 75 U/L (ref 30–99)
ALT SERPL-CCNC: 6 U/L (ref 2–50)
ANION GAP SERPL CALC-SCNC: 11 MMOL/L (ref 7–16)
AST SERPL-CCNC: 10 U/L (ref 12–45)
BILIRUB SERPL-MCNC: 0.5 MG/DL (ref 0.1–1.5)
BUN SERPL-MCNC: 20 MG/DL (ref 8–22)
CALCIUM SERPL-MCNC: 9 MG/DL (ref 8.4–10.2)
CHLORIDE SERPL-SCNC: 99 MMOL/L (ref 96–112)
CO2 SERPL-SCNC: 23 MMOL/L (ref 20–33)
CORTIS SERPL-MCNC: 16.8 UG/DL (ref 0–23)
CREAT SERPL-MCNC: 1.04 MG/DL (ref 0.5–1.4)
EKG IMPRESSION: NORMAL
ERYTHROCYTE [DISTWIDTH] IN BLOOD BY AUTOMATED COUNT: 52 FL (ref 35.9–50)
GFR SERPLBLD CREATININE-BSD FMLA CKD-EPI: 52 ML/MIN/1.73 M 2
GLOBULIN SER CALC-MCNC: 2.1 G/DL (ref 1.9–3.5)
GLUCOSE SERPL-MCNC: 114 MG/DL (ref 65–99)
HCT VFR BLD AUTO: 34.7 % (ref 37–47)
HGB BLD-MCNC: 11.4 G/DL (ref 12–16)
MAGNESIUM SERPL-MCNC: 1.6 MG/DL (ref 1.5–2.5)
MCH RBC QN AUTO: 28.2 PG (ref 27–33)
MCHC RBC AUTO-ENTMCNC: 32.9 G/DL (ref 33.6–35)
MCV RBC AUTO: 85.9 FL (ref 81.4–97.8)
PLATELET # BLD AUTO: 313 K/UL (ref 164–446)
PMV BLD AUTO: 10.7 FL (ref 9–12.9)
POTASSIUM SERPL-SCNC: 4.1 MMOL/L (ref 3.6–5.5)
PROT SERPL-MCNC: 5.8 G/DL (ref 6–8.2)
RBC # BLD AUTO: 4.04 M/UL (ref 4.2–5.4)
SODIUM SERPL-SCNC: 133 MMOL/L (ref 135–145)
TROPONIN T SERPL-MCNC: 32 NG/L (ref 6–19)
WBC # BLD AUTO: 18.6 K/UL (ref 4.8–10.8)

## 2022-11-25 PROCEDURE — 93005 ELECTROCARDIOGRAM TRACING: CPT | Performed by: HOSPITALIST

## 2022-11-25 PROCEDURE — 700102 HCHG RX REV CODE 250 W/ 637 OVERRIDE(OP): Performed by: HOSPITALIST

## 2022-11-25 PROCEDURE — A9270 NON-COVERED ITEM OR SERVICE: HCPCS | Performed by: HOSPITALIST

## 2022-11-25 PROCEDURE — 84484 ASSAY OF TROPONIN QUANT: CPT

## 2022-11-25 PROCEDURE — 85027 COMPLETE CBC AUTOMATED: CPT

## 2022-11-25 PROCEDURE — 82533 TOTAL CORTISOL: CPT

## 2022-11-25 PROCEDURE — 83735 ASSAY OF MAGNESIUM: CPT

## 2022-11-25 PROCEDURE — 80053 COMPREHEN METABOLIC PANEL: CPT

## 2022-11-25 PROCEDURE — 99217 PR OBSERVATION CARE DISCHARGE: CPT | Performed by: INTERNAL MEDICINE

## 2022-11-25 PROCEDURE — 36415 COLL VENOUS BLD VENIPUNCTURE: CPT

## 2022-11-25 PROCEDURE — 93010 ELECTROCARDIOGRAM REPORT: CPT | Performed by: INTERNAL MEDICINE

## 2022-11-25 PROCEDURE — G0378 HOSPITAL OBSERVATION PER HR: HCPCS

## 2022-11-25 RX ADMIN — LEVOTHYROXINE SODIUM 75 MCG: 0.07 TABLET ORAL at 05:03

## 2022-11-25 RX ADMIN — TIZANIDINE 2 MG: 4 TABLET ORAL at 01:01

## 2022-11-25 NOTE — DISCHARGE SUMMARY
"Discharge Summary    CHIEF COMPLAINT ON ADMISSION  Chief Complaint   Patient presents with    Hypotension     Pt was doing fine until about 1600 today, since then has been lightheaded and dizzy. BP upon ems arrival was 80/60's, IV started 250ml fluid given per ems       Reason for Admission  EMS     Admission Date  11/24/2022    CODE STATUS  Full Code    HPI & HOSPITAL COURSE  Per notes, \"87 y.o. female with a past medical history of primary hypertension and hypothyroidism who presented 11/24/2022 with lightheadedness and low blood pressure readings in the range of 80s over 60s.  Symptoms started suddenly around 4 PM where patient developed lightheadedness and dizziness and generalized weakness.  Patient denies noticing any chest pain shortness of breath or palpitations.  Reportedly her systolic blood pressure was found to be in the 80s.  Patient did not pass out.  Reportedly the patient had similar episodes about 4 days ago.\"    Patient was admitted and monitored overnight for hypotension.  Patient's symptoms resolved with IV fluids.  She did have an elevated white count and initially did receive a dose of ceftriaxone.  However she had no fever or other signs of infection.  Additionally she had recently been on steroids which could have contributed to this elevation.  Therefore at this time I do not believe it is necessary for her to continue on antibiotics.  I have held her outpatient hypertension medications as she likely no longer needs this at this time.  I recommend she follow-up with PCP for further management of this and recheck of blood pressure within 1 to 2 weeks.      Therefore, she is discharged in good and stable condition to home with close outpatient follow-up.    The patient recovered much more quickly than anticipated on admission.    Discharge Date  11/25/2022    FOLLOW UP ITEMS POST DISCHARGE  FU with PCP    DISCHARGE DIAGNOSES  Principal Problem:    Hypotension and lightheadedness POA: " Yes  Active Problems:    Acquired hypothyroidism POA: Yes    Primary hypertension POA: Yes    Leukocytosis POA: Yes  Resolved Problems:    * No resolved hospital problems. *      FOLLOW UP  Future Appointments   Date Time Provider Department Center   12/15/2022 11:30 AM Ferny Garcia P.A.-C. [facility]     No follow-up provider specified.    MEDICATIONS ON DISCHARGE     Medication List        CHANGE how you take these medications        Instructions   acetaminophen 500 MG Tabs  What changed:   when to take this  reasons to take this  Commonly known as: TYLENOL   Take 2 Tablets by mouth every 6 hours.  Dose: 1,000 mg     * levothyroxine 75 MCG Tabs  What changed: when to take this  Commonly known as: SYNTHROID   Take 1 Tab by mouth Every morning on an empty stomach. Take one tablet 5 days a week; T,W,F,S,S   Indications: Underactive Thyroid  Dose: 75 mcg     * levothyroxine 50 MCG Tabs  What changed:   how much to take  when to take this  additional instructions  Commonly known as: SYNTHROID   Take 1 Tab by mouth Every morning on an empty stomach. Take 1 tab on Monday and Thursday  Indications: Underactive Thyroid  Dose: 50 mcg     sennosides-docusate sodium 8.6-50 MG tablet  What changed:   when to take this  reasons to take this  Commonly known as: SENOKOT-S   Take 1 Tablet by mouth every day.  Dose: 1 Tablet     * tizanidine 4 MG Tabs  What changed:   how much to take  when to take this  Commonly known as: ZANAFLEX   Take 0.5-1 Tablets by mouth 3 times a day as needed (muscle spasm).  Dose: 2-4 mg     * tizanidine 4 MG Tabs  What changed:   when to take this  reasons to take this  Commonly known as: ZANAFLEX   Take 1 Tablet by mouth 4 times a day.  Dose: 4 mg           * This list has 4 medication(s) that are the same as other medications prescribed for you. Read the directions carefully, and ask your doctor or other care provider to review them with you.                CONTINUE taking these medications         Instructions   alendronate 70 MG Tabs  Commonly known as: FOSAMAX   Take 70 mg by mouth every 7 days. On Wed  Dose: 70 mg     MAGNESIUM PO   Take  by mouth every day.     VITAMIN D (CHOLECALCIFEROL) PO   Take  by mouth every day.            STOP taking these medications      lisinopril-hydrochlorothiazide 20-12.5 MG per tablet  Commonly known as: PRINZIDE              Allergies  Allergies   Allergen Reactions    Sulfa Drugs Itching       DIET  Orders Placed This Encounter   Procedures    Diet Order Diet: Regular     Standing Status:   Standing     Number of Occurrences:   1     Order Specific Question:   Diet:     Answer:   Regular [1]       ACTIVITY  As tolerated.  Weight bearing as tolerated    CONSULTATIONS  None    PROCEDURES  None    LABORATORY  Lab Results   Component Value Date    SODIUM 133 (L) 11/25/2022    POTASSIUM 4.1 11/25/2022    CHLORIDE 99 11/25/2022    CO2 23 11/25/2022    GLUCOSE 114 (H) 11/25/2022    BUN 20 11/25/2022    CREATININE 1.04 11/25/2022        Lab Results   Component Value Date    WBC 18.6 (H) 11/25/2022    HEMOGLOBIN 11.4 (L) 11/25/2022    HEMATOCRIT 34.7 (L) 11/25/2022    PLATELETCT 313 11/25/2022        Total time of the discharge process exceeds 45 minutes.

## 2022-11-25 NOTE — ASSESSMENT & PLAN NOTE
Hold home blood pressure medications for now  Patient appears dehydrated, will start intravenous fluids  EKG changes sinus rhythm with a rate of 80, there is no significant ST depression, there is 0.5 mm ST elevation in lead II that is seen on prior EKGs.    We will check troponin and follow-up EKG  Continuous cardiac monitoring  Has been on a course of steroids recently  We will check random cortisol to rule out [relative] adrenal insufficiency

## 2022-11-25 NOTE — PROGRESS NOTES
Discharged home with daughter. Instructions given to patient and understood to stop blood pressure medications. Instructions given to check BP twice daily and record for follow ups. PIV removed. Wheeled down by Manjinder, care aid.

## 2022-11-25 NOTE — ASSESSMENT & PLAN NOTE
Likely reactive secondary to steroids   No focal sign of infection at this point   Started on ceftriaxone in the emergency room for possible UA, continue for now until urine analysis is available  Continue to montior for signs of infection

## 2022-11-25 NOTE — PROGRESS NOTES
Telemetry Shift Summary     Rhythm: SR  HR: 83-85  Ectopy: oPAC, fPVC    Measurements: 0.16/0.08/0.36    Normal Values  Rhythm: SR  HR:   Measurements: 0.12-0.20/0.08-0.10/0.30-0.52'

## 2022-11-25 NOTE — H&P
Hospital Medicine History & Physical Note    Date of Service  11/24/2022    Primary Care Physician  Eliane Lloyd M.D.    Consultants  None     Code Status  Full Code    Chief Complaint  Chief Complaint   Patient presents with    Hypotension     Pt was doing fine until about 1600 today, since then has been lightheaded and dizzy. BP upon ems arrival was 80/60's, IV started 250ml fluid given per ems     History of Presenting Illness  Michelle Mckeon is a 87 y.o. female with a past medical history of primary hypertension and hypothyroidism who presented 11/24/2022 with lightheadedness and low blood pressure readings in the range of 80s over 60s.  Symptoms started suddenly around 4 PM where patient developed lightheadedness and dizziness and generalized weakness.  Patient denies noticing any chest pain shortness of breath or palpitations.  Reportedly her systolic blood pressure was found to be in the 80s.  Patient did not pass out.  Reportedly the patient had similar episodes about 4 days ago.    I discussed the plan of care with emergency department physician, and the patient.    Review of Systems  Review of Systems   Constitutional:  Positive for malaise/fatigue. Negative for chills and fever.   Eyes:  Negative for discharge and redness.   Respiratory:  Negative for cough, shortness of breath and stridor.    Cardiovascular:  Negative for chest pain and leg swelling.   Gastrointestinal:  Negative for abdominal pain and vomiting.   Genitourinary:  Negative for flank pain.   Musculoskeletal:  Negative for myalgias.   Skin: Negative.    Neurological:  Positive for dizziness. Negative for focal weakness.   Endo/Heme/Allergies:  Does not bruise/bleed easily.   Psychiatric/Behavioral:  The patient is not nervous/anxious.      Past Medical History   has a past medical history of Arthritis, Chronic kidney disease, Disorder of thyroid, Fall, and Hypertension.    Surgical History   has a past surgical history that includes  other orthopedic surgery; lumbar fusion o-arm (10/31/2022); lumbar laminectomy diskectomy (10/31/2022); foraminotomy (Bilateral, 10/31/2022); and lumbar decompression (10/31/2022).     Family History  No known history of heart disease in father or mother.    Social History   reports that she quit smoking about 57 years ago. Her smoking use included cigarettes. She smoked an average of .25 packs per day. She has never used smokeless tobacco. She reports that she does not currently use alcohol after a past usage of about 4.2 oz per week. She reports that she does not use drugs.    Allergies  Allergies   Allergen Reactions    Sulfa Drugs Itching     Medications  Prior to Admission Medications   Prescriptions Last Dose Informant Patient Reported? Taking?   MAGNESIUM PO  Patient Yes No   Sig: Take 1 Capsule by mouth every day.   acetaminophen (TYLENOL) 500 MG Tab  Patient Yes No   Sig: Take 1,000 mg by mouth every 6 hours as needed. Indications: Pain   acetaminophen (TYLENOL) 500 MG Tab   No No   Sig: Take 2 Tablets by mouth every 6 hours.   alendronate (FOSAMAX) 70 MG Tab  Patient Yes No   Sig: Take 70 mg by mouth every 7 days. On Wed   levothyroxine (SYNTHROID) 50 MCG Tab  Patient No No   Sig: Take 1 Tab by mouth Every morning on an empty stomach. Take 1 tab on Monday and Thursday  Indications: Underactive Thyroid   Patient taking differently: Take 50-75 mcg by mouth see administration instructions. Take 50MCG on Monday and Thursday  75MCG all other days  Indications: Underactive Thyroid   levothyroxine (SYNTHROID) 75 MCG Tab  Patient No No   Sig: Take 1 Tab by mouth Every morning on an empty stomach. Take one tablet 5 days a week; T,W,F,S,S   Indications: Underactive Thyroid   Patient taking differently: Take 75 mcg by mouth see administration instructions. Take one tablet 5 days a week; T,W,F,S,S   Indications: Underactive Thyroid   lisinopril-hydrochlorothiazide (PRINZIDE) 20-12.5 MG per tablet  Patient Yes No    Sig: Take 1 Tablet by mouth every day.   methylPREDNISolone (MEDROL DOSEPAK) 4 MG Tablet Therapy Pack   No No   Sig: Follow schedule on package instructions.   sennosides-docusate sodium (SENOKOT-S) 8.6-50 MG tablet   No No   Sig: Take 1 Tablet by mouth every day.   tizanidine (ZANAFLEX) 4 MG Tab   No No   Sig: Take 0.5-1 Tablets by mouth 3 times a day as needed (muscle spasm).   tizanidine (ZANAFLEX) 4 MG Tab   No No   Sig: Take 1 Tablet by mouth 4 times a day.      Facility-Administered Medications: None     Physical Exam  Temp:  [36.7 °C (98 °F)] 36.7 °C (98 °F)  Pulse:  [82-89] 83  Resp:  [16] 16  BP: ()/(50-53) 111/53  SpO2:  [94 %-96 %] 95 %  Blood Pressure : 100/52   Temperature: 36.7 °C (98 °F)   Pulse: 82   Respiration: 16   Pulse Oximetry: 94 %     Physical Exam  Constitutional:       General: She is not in acute distress.  HENT:      Head: Normocephalic and atraumatic.      Right Ear: External ear normal.      Left Ear: External ear normal.      Nose: No congestion or rhinorrhea.      Mouth/Throat:      Mouth: Mucous membranes are dry.      Pharynx: No oropharyngeal exudate or posterior oropharyngeal erythema.   Eyes:      General: No scleral icterus.        Right eye: No discharge.         Left eye: No discharge.      Conjunctiva/sclera: Conjunctivae normal.      Pupils: Pupils are equal, round, and reactive to light.   Cardiovascular:      Rate and Rhythm: Normal rate and regular rhythm.      Heart sounds:     No friction rub. No gallop.   Pulmonary:      Effort: Pulmonary effort is normal.   Abdominal:      General: Abdomen is flat. There is no distension.      Tenderness: There is no guarding.   Musculoskeletal:         General: No swelling.      Cervical back: Neck supple. No rigidity. No muscular tenderness.      Right lower leg: No edema.      Left lower leg: No edema.   Skin:     General: Skin is dry.      Capillary Refill: Capillary refill takes 2 to 3 seconds.      Coloration: Skin is  not jaundiced or pale.      Findings: No bruising or erythema.   Neurological:      Mental Status: She is alert and oriented to person, place, and time.   Psychiatric:         Mood and Affect: Mood normal.         Judgment: Judgment normal.     Laboratory:  Recent Labs     11/24/22 1940   WBC 24.1*   RBC 4.14*   HEMOGLOBIN 11.7*   HEMATOCRIT 35.1*   MCV 84.8   MCH 28.3   MCHC 33.3*   RDW 51.3*   PLATELETCT 380   MPV 11.2     Recent Labs     11/24/22 1940   SODIUM 130*   POTASSIUM 4.1   CHLORIDE 96   CO2 23   GLUCOSE 114*   BUN 26*   CREATININE 1.31   CALCIUM 9.0     Recent Labs     11/24/22 1940   GLUCOSE 114*         No results for input(s): NTPROBNP in the last 72 hours.      No results for input(s): TROPONINT in the last 72 hours.    Imaging:  DX-CHEST-PORTABLE (1 VIEW)    (Results Pending)     Patient reviewed patient EKG changes sinus rhythm with a rate of 80, there is no significant ST depression, there is 0.5 mm ST elevation in lead II that is seen on prior EKGs.      Assessment/Plan:  Justification for Admission Status  I anticipate this patient is appropriate for observation status at this time because likely discharge after 1 midnight    * Hypotension and lightheadedness- (present on admission)  Assessment & Plan  Hold home blood pressure medications for now  Patient appears dehydrated, will start intravenous fluids  EKG changes sinus rhythm with a rate of 80, there is no significant ST depression, there is 0.5 mm ST elevation in lead II that is seen on prior EKGs.    We will check troponin and follow-up EKG  Continuous cardiac monitoring  Has been on a course of steroids recently  We will check random cortisol to rule out [relative] adrenal insufficiency    Leukocytosis- (present on admission)  Assessment & Plan  Likely reactive secondary to steroids   No focal sign of infection at this point   Started on ceftriaxone in the emergency room for possible UA, continue for now until urine  analysis is available  Continue to montior for signs of infection      Primary hypertension- (present on admission)  Assessment & Plan  Currently has hypotension holding home lisinopril & hydrochlorothiazide    Acquired hypothyroidism- (present on admission)  Assessment & Plan  Resume home levothyroxine    VTE prophylaxis: SCDs/TEDs, has a history of subdural hemorrhage   Statement Selected

## 2022-11-25 NOTE — ED TRIAGE NOTES
Bib ems for above complaints.     Chief Complaint   Patient presents with    Hypotension     Pt was doing fine until about 1600 today, since then has been lightheaded and dizzy. BP upon ems arrival was 80/60's, IV started 250ml fluid given per ems     BP (!) 94/51   Pulse 89   SpO2 95%   Temp 98f

## 2022-11-25 NOTE — PROGRESS NOTES
4 Eyes Skin Assessment Completed by ELI Jacinto and Elio RN.    Head WDL  Ears WDL  Nose WDL  Mouth WDL  Neck WDL  Breast/Chest WDL  Shoulder Blades WDL  Spine Incision healing surgical site from previous lumbar surgery  (R) Arm/Elbow/Hand WDL  (L) Arm/Elbow/Hand WDL  Abdomen WDL  Groin WDL  Scrotum/Coccyx/Buttocks WDL  (R) Leg Scar  (L) Leg Scar  (R) Heel/Foot/Toe WDL  (L) Heel/Foot/Toe WDL          Devices In Places Tele Box      Interventions In Place N/A    Possible Skin Injury No    Pictures Uploaded Into Epic Yes  Wound Consult Placed N/A  RN Wound Prevention Protocol Ordered No

## 2022-11-25 NOTE — PROGRESS NOTES
Pt arrived to unit from ER via [facility]. Ambulated from [facility] to bed, accompanied by RN. PT A&Ox4. Tele monitor applied, vitals taken. History, allergies and med rec reviewed and admission profile completed.     Pt oriented to unit and POC discussed. Welcome folder provided and discussed. Communication board filled out. Pt instructed to call light usage and encouraged to call for any questions, needs, or concerns and prior to getting out of bed.

## 2022-11-25 NOTE — CARE PLAN
The patient is Stable - Low risk of patient condition declining or worsening    Shift Goals  Clinical Goals: IV fluids, monitor bp and labs  Patient Goals: Rest    Progress made toward(s) clinical / shift goals:    Problem: Knowledge Deficit - Standard  Goal: Patient and family/care givers will demonstrate understanding of plan of care, disease process/condition, diagnostic tests and medications  Outcome: Progressing  Note: POC reviewed, with patient, all questions answered.      Problem: Hemodynamics  Goal: Patient's hemodynamics, fluid balance and neurologic status will be stable or improve  Outcome: Progressing       Patient is not progressing towards the following goals:

## 2022-11-25 NOTE — ED PROVIDER NOTES
ED Provider Note    CHIEF COMPLAINT  Chief Complaint   Patient presents with    Hypotension     Pt was doing fine until about 1600 today, since then has been lightheaded and dizzy. BP upon ems arrival was 80/60's, IV started 250ml fluid given per ems       HPI  Michelle Mckeon is a 87 y.o. female with history of hypertension, thyroid disease, chronic kidney disease, recent lumbar surgery on 10/31 who presents complaining of lightheadedness.    Patient states she had acute onset of dizziness/lightheadedness and fatigue today at 4 PM.  She was eating at the time.  This has continued for several hours so she came to the ER for evaluation.  She took her blood pressure at home and it was found to be in the 80s.  Her daughter with whom she lives called 911.    Patient reports a similar episode 4 days ago.  She felt fatigued, sleepy.  She checked her blood pressure and it was in the 80s systolic.  She went to bed to rest rechecked her blood pressure several times and it did not go lower than 80 so she slept and when she woke up the next morning she felt improved.    Patient takes lisinopril and has not had a new prescription or change in this dose for some time.    Patient denies fever, chills, vomiting, diarrhea, chest pain, shortness of breath, headache, vertigo, urinary symptoms.    Patient finished her steroids several days ago.      ALLERGIES  Allergies   Allergen Reactions    Sulfa Drugs Itching       CURRENT MEDICATIONS  Synthroid, Zanaflex, Tylenol    PAST MEDICAL HISTORY   has a past medical history of Arthritis, Chronic kidney disease, Disorder of thyroid, Fall, and Hypertension.    SURGICAL HISTORY   has a past surgical history that includes other orthopedic surgery; lumbar fusion o-arm (10/31/2022); lumbar laminectomy diskectomy (10/31/2022); foraminotomy (Bilateral, 10/31/2022); and lumbar decompression (10/31/2022).    SOCIAL HISTORY  Social History     Tobacco Use    Smoking status: Former      Packs/day: 0.25     Types: Cigarettes     Quit date: 1965     Years since quittin.9    Smokeless tobacco: Never   Vaping Use    Vaping Use: Never used   Substance and Sexual Activity    Alcohol use: Not Currently     Alcohol/week: 4.2 oz     Types: 7 Glasses of wine per week    Drug use: Never    Sexual activity: Not on file       Family Hx:  No h/o CAD      REVIEW OF SYSTEMS  See HPI for further details.  All other systems are negative except as above in HPI.      PHYSICAL EXAM  VITAL SIGNS: /52   Pulse 82   Temp 36.7 °C (98 °F) (Temporal)   Resp 16   Wt 63 kg (138 lb 14.2 oz)   SpO2 94%   BMI 25.40 kg/m²     General:  WDWN elderly female, nontoxic appearing in NAD; A+Ox3; V/S as above; hypotensive, afebrile, not tachycardic  Skin: warm and dry; good color; no rash  HEENT: NCAT; EOMs intact; no scleral icterus   Neck: FROM; soft  Cardiovascular: Regular heart rate and rhythm.  No murmurs, rubs, or gallops; pulses 2+ bilaterally radially  Lungs: Clear to auscultation with good air movement bilaterally.  No wheezes, rhonchi, or rales.   Abdomen: BS present; soft; NTND; no rebound, guarding, or rigidity.  No organomegaly or pulsatile mass  Back: Scabbed midline vertical incision in the lumbar spine; no erythema or drainage noted  Extremities: SNYDER x 4; no e/o trauma; no pedal edema; neg Nathanael's  Neurologic: CNs III-XII grossly intact; speech clear; distal sensation intact; strength 5/5 UE/LEs  Psychiatric: Appropriate affect, normal mood    LABS  Results for orders placed or performed during the hospital encounter of 22   URINALYSIS    Specimen: Urine   Result Value Ref Range    Micro Urine Req Microscopic    LACTIC ACID   Result Value Ref Range    Lactic Acid 1.3 0.5 - 2.0 mmol/L   CBC WITH DIFFERENTIAL   Result Value Ref Range    WBC 24.1 (H) 4.8 - 10.8 K/uL    RBC 4.14 (L) 4.20 - 5.40 M/uL    Hemoglobin 11.7 (L) 12.0 - 16.0 g/dL    Hematocrit 35.1 (L) 37.0 - 47.0 %    MCV 84.8 81.4 - 97.8  fL    MCH 28.3 27.0 - 33.0 pg    MCHC 33.3 (L) 33.6 - 35.0 g/dL    RDW 51.3 (H) 35.9 - 50.0 fL    Platelet Count 380 164 - 446 K/uL    MPV 11.2 9.0 - 12.9 fL    Neutrophils-Polys 64.00 44.00 - 72.00 %    Lymphocytes 11.00 (L) 22.00 - 41.00 %    Monocytes 17.00 (H) 0.00 - 13.40 %    Eosinophils 1.00 0.00 - 6.90 %    Basophils 1.00 0.00 - 1.80 %    Nucleated RBC 0.00 /100 WBC    Neutrophils (Absolute) 16.39 (H) 2.00 - 7.15 K/uL    Lymphs (Absolute) 2.65 1.00 - 4.80 K/uL    Monos (Absolute) 4.10 (H) 0.00 - 0.85 K/uL    Eos (Absolute) 0.24 0.00 - 0.51 K/uL    Baso (Absolute) 0.24 (H) 0.00 - 0.12 K/uL    NRBC (Absolute) 0.00 K/uL   Basic Metabolic Panel   Result Value Ref Range    Sodium 130 (L) 135 - 145 mmol/L    Potassium 4.1 3.6 - 5.5 mmol/L    Chloride 96 96 - 112 mmol/L    Co2 23 20 - 33 mmol/L    Glucose 114 (H) 65 - 99 mg/dL    Bun 26 (H) 8 - 22 mg/dL    Creatinine 1.31 0.50 - 1.40 mg/dL    Calcium 9.0 8.4 - 10.2 mg/dL    Anion Gap 11.0 7.0 - 16.0   TSH WITH REFLEX TO FT4   Result Value Ref Range    TSH 0.896 0.380 - 5.330 uIU/mL   ESTIMATED GFR   Result Value Ref Range    GFR (CKD-EPI) 40 (A) >60 mL/min/1.73 m 2   DIFFERENTIAL MANUAL   Result Value Ref Range    Bands-Stabs 4.00 0.00 - 10.00 %    Myelocytes 2.00 %    Manual Diff Status PERFORMED    PLATELET ESTIMATE   Result Value Ref Range    Plt Estimation Normal    MORPHOLOGY   Result Value Ref Range    RBC Morphology Normal    EKG   Result Value Ref Range    Report       [facility]    Test Date:  2022  Pt Name:    JIN OLMEDO               Department: [facility]  MRN:        [id number]                      [facility]  Gender:     Female                       Technician: [id number]  :        [id number]                   Requested By:JAMES CERVANTES  Order #:    [id number]                    Reading MD: JAMES CERVANTES MD    Measurements  Intervals                                Axis  Rate:       80                            P:          58  SD:         159                          QRS:        41  QRSD:       77                           T:          41  QT:         372  QTc:        430    Interpretive Statements  Sinus rhythm  RSR' in V1 or V2, right VCD or RVH  Compared to ECG 10/18/2022 09:41:37  No significant changes  Electronically Signed On 11- 19:56:56 PST by JAMES CERVANTES MD         MEDICAL RECORD  I have reviewed patient's medical record and pertinent results are listed below.      COURSE & MEDICAL DECISION MAKING  I have reviewed any medical record information, laboratory studies and radiographic results as noted.    Michelle Mckeon is a 87 y.o. female who presents complaining of dizziness/lightheadedness.  Patient feels slightly improved now.  She received normal saline bolus of 250 mL by EMS before arrival for blood pressure in the 80s.  Patient is currently 94 systolic.  She denies any infectious symptoms.  She does take lisinopril for hypertension.  She does not believe she has been taking more than prescribed.  She has been taking tizanidine and a Medrol Dosepak following spinal surgery.    Appropriate PPE was worn at all times while interacting with the patient.     mL bolus was ordered for hypotension.    EKG shows no acute ST changes.    Pt's WBC is elevated--possibly due to steroid tx.  I reviewed the patient's most recent CBC x 2, both of which show elevated white blood cell counts as well.  Today's, is higher.  Patient's surgery was on 10/31.  Her prior leukocytosis could be attributed to recent surgery.    It is reassuring that the patient's lactic acid is normal and does not suggest hypoperfusion secondary to sepsis.  Patient has a stable anemia, mild hyponatremia, BUN elevated to 26 and glucose of 114.  Blood cultures are pending.  Urinalysis is pending.    Pt's blood pressure improved after fluid bolus.    8:45 PM  I discussed the case with the hospitalist who agrees to admit  the patient.    Patient was reevaluated.  Her daughter is now at the bedside.  Patient again denies any infectious symptoms.  We discussed the plan to admit her to the hospital.  She is amenable to this.    The total critical care time on this patient is 35 minutes, resuscitating patient, speaking with admitting physician, and deciphering test results. This 35 minutes is exclusive of separately billable procedures.         IMPRESSION  1. Hypotension, unspecified hypotension type        2. Leukocytosis, unspecified type            Electronically signed by: Maddi Obrien M.D., 11/24/2022 7:13 PM

## 2022-11-29 LAB
BACTERIA BLD CULT: ABNORMAL
BACTERIA BLD CULT: ABNORMAL
BACTERIA BLD CULT: NORMAL
SIGNIFICANT IND 70042: ABNORMAL
SIGNIFICANT IND 70042: NORMAL
SITE SITE: ABNORMAL
SITE SITE: NORMAL
SOURCE SOURCE: ABNORMAL
SOURCE SOURCE: NORMAL

## 2023-01-18 ENCOUNTER — HOSPITAL ENCOUNTER (OUTPATIENT)
Dept: LAB | Facility: MEDICAL CENTER | Age: 88
End: 2023-01-18
Attending: FAMILY MEDICINE
Payer: MEDICARE

## 2023-01-18 LAB
ALBUMIN SERPL BCP-MCNC: 4.8 G/DL (ref 3.2–4.9)
ALBUMIN/GLOB SERPL: 2.5 G/DL
ALP SERPL-CCNC: 66 U/L (ref 30–99)
ALT SERPL-CCNC: 7 U/L (ref 2–50)
ANION GAP SERPL CALC-SCNC: 12 MMOL/L (ref 7–16)
AST SERPL-CCNC: 11 U/L (ref 12–45)
BILIRUB SERPL-MCNC: 0.6 MG/DL (ref 0.1–1.5)
BUN SERPL-MCNC: 13 MG/DL (ref 8–22)
CALCIUM ALBUM COR SERPL-MCNC: 9.2 MG/DL (ref 8.5–10.5)
CALCIUM SERPL-MCNC: 9.8 MG/DL (ref 8.5–10.5)
CHLORIDE SERPL-SCNC: 105 MMOL/L (ref 96–112)
CHOLEST SERPL-MCNC: 222 MG/DL (ref 100–199)
CO2 SERPL-SCNC: 26 MMOL/L (ref 20–33)
CREAT SERPL-MCNC: 0.94 MG/DL (ref 0.5–1.4)
FASTING STATUS PATIENT QL REPORTED: NORMAL
GFR SERPLBLD CREATININE-BSD FMLA CKD-EPI: 59 ML/MIN/1.73 M 2
GLOBULIN SER CALC-MCNC: 1.9 G/DL (ref 1.9–3.5)
GLUCOSE SERPL-MCNC: 102 MG/DL (ref 65–99)
HDLC SERPL-MCNC: 118 MG/DL
LDLC SERPL CALC-MCNC: 93 MG/DL
POTASSIUM SERPL-SCNC: 4.9 MMOL/L (ref 3.6–5.5)
PROT SERPL-MCNC: 6.7 G/DL (ref 6–8.2)
SODIUM SERPL-SCNC: 143 MMOL/L (ref 135–145)
TRIGL SERPL-MCNC: 56 MG/DL (ref 0–149)

## 2023-01-18 PROCEDURE — 80061 LIPID PANEL: CPT

## 2023-01-18 PROCEDURE — 80053 COMPREHEN METABOLIC PANEL: CPT

## 2023-01-18 PROCEDURE — 84443 ASSAY THYROID STIM HORMONE: CPT

## 2023-01-18 PROCEDURE — 36415 COLL VENOUS BLD VENIPUNCTURE: CPT

## 2023-01-19 LAB — TSH SERPL DL<=0.005 MIU/L-ACNC: 1.55 UIU/ML (ref 0.38–5.33)

## 2023-04-03 ENCOUNTER — APPOINTMENT (OUTPATIENT)
Dept: RADIOLOGY | Facility: MEDICAL CENTER | Age: 88
End: 2023-04-03
Attending: EMERGENCY MEDICINE
Payer: MEDICARE

## 2023-04-03 ENCOUNTER — HOSPITAL ENCOUNTER (EMERGENCY)
Facility: MEDICAL CENTER | Age: 88
End: 2023-04-03
Attending: EMERGENCY MEDICINE
Payer: MEDICARE

## 2023-04-03 VITALS
TEMPERATURE: 98.1 F | HEIGHT: 62 IN | OXYGEN SATURATION: 96 % | DIASTOLIC BLOOD PRESSURE: 71 MMHG | BODY MASS INDEX: 24.59 KG/M2 | HEART RATE: 64 BPM | RESPIRATION RATE: 18 BRPM | SYSTOLIC BLOOD PRESSURE: 163 MMHG | WEIGHT: 133.6 LBS

## 2023-04-03 DIAGNOSIS — R41.82 ALTERED MENTAL STATUS, UNSPECIFIED ALTERED MENTAL STATUS TYPE: ICD-10-CM

## 2023-04-03 LAB
ABO GROUP BLD: NORMAL
ALBUMIN SERPL BCP-MCNC: 4.8 G/DL (ref 3.2–4.9)
ALBUMIN/GLOB SERPL: 2.3 G/DL
ALP SERPL-CCNC: 75 U/L (ref 30–99)
ALT SERPL-CCNC: <5 U/L (ref 2–50)
ANION GAP SERPL CALC-SCNC: 13 MMOL/L (ref 7–16)
APPEARANCE UR: CLEAR
APTT PPP: 31.5 SEC (ref 24.7–36)
AST SERPL-CCNC: 16 U/L (ref 12–45)
BASOPHILS # BLD AUTO: 0 % (ref 0–1.8)
BASOPHILS # BLD: 0 K/UL (ref 0–0.12)
BILIRUB SERPL-MCNC: 0.7 MG/DL (ref 0.1–1.5)
BILIRUB UR QL STRIP.AUTO: NEGATIVE
BLD GP AB SCN SERPL QL: NORMAL
BUN SERPL-MCNC: 22 MG/DL (ref 8–22)
CALCIUM ALBUM COR SERPL-MCNC: 9.1 MG/DL (ref 8.5–10.5)
CALCIUM SERPL-MCNC: 9.7 MG/DL (ref 8.4–10.2)
CHLORIDE SERPL-SCNC: 100 MMOL/L (ref 96–112)
CO2 SERPL-SCNC: 26 MMOL/L (ref 20–33)
COLOR UR: YELLOW
CREAT SERPL-MCNC: 1.12 MG/DL (ref 0.5–1.4)
EKG IMPRESSION: NORMAL
EOSINOPHIL # BLD AUTO: 0 K/UL (ref 0–0.51)
EOSINOPHIL NFR BLD: 0 % (ref 0–6.9)
ERYTHROCYTE [DISTWIDTH] IN BLOOD BY AUTOMATED COUNT: 39.9 FL (ref 35.9–50)
GFR SERPLBLD CREATININE-BSD FMLA CKD-EPI: 48 ML/MIN/1.73 M 2
GLOBULIN SER CALC-MCNC: 2.1 G/DL (ref 1.9–3.5)
GLUCOSE BLD STRIP.AUTO-MCNC: 76 MG/DL (ref 65–99)
GLUCOSE SERPL-MCNC: 91 MG/DL (ref 65–99)
GLUCOSE UR STRIP.AUTO-MCNC: NEGATIVE MG/DL
HCT VFR BLD AUTO: 51.6 % (ref 37–47)
HGB BLD-MCNC: 16.8 G/DL (ref 12–16)
INR PPP: 1.05 (ref 0.87–1.13)
KETONES UR STRIP.AUTO-MCNC: NEGATIVE MG/DL
LEUKOCYTE ESTERASE UR QL STRIP.AUTO: NEGATIVE
LYMPHOCYTES # BLD AUTO: 1.78 K/UL (ref 1–4.8)
LYMPHOCYTES NFR BLD: 12 % (ref 22–41)
MANUAL DIFF BLD: NORMAL
MCH RBC QN AUTO: 24 PG (ref 27–33)
MCHC RBC AUTO-ENTMCNC: 32.6 G/DL (ref 33.6–35)
MCV RBC AUTO: 73.7 FL (ref 81.4–97.8)
MICRO URNS: NORMAL
MONOCYTES # BLD AUTO: 2.22 K/UL (ref 0–0.85)
MONOCYTES NFR BLD AUTO: 15 % (ref 0–13.4)
NEUTROPHILS # BLD AUTO: 10.8 K/UL (ref 2–7.15)
NEUTROPHILS NFR BLD: 69 % (ref 44–72)
NEUTS BAND NFR BLD MANUAL: 4 % (ref 0–10)
NITRITE UR QL STRIP.AUTO: NEGATIVE
NRBC # BLD AUTO: 0 K/UL
NRBC BLD-RTO: 0 /100 WBC
PH UR STRIP.AUTO: 7 [PH] (ref 5–8)
PLATELET # BLD AUTO: 208 K/UL (ref 164–446)
PMV BLD AUTO: 9.6 FL (ref 9–12.9)
POTASSIUM SERPL-SCNC: 4.4 MMOL/L (ref 3.6–5.5)
PROT SERPL-MCNC: 6.9 G/DL (ref 6–8.2)
PROT UR QL STRIP: NEGATIVE MG/DL
PROTHROMBIN TIME: 13.6 SEC (ref 12–14.6)
RBC # BLD AUTO: 7 M/UL (ref 4.2–5.4)
RBC UR QL AUTO: NEGATIVE
RH BLD: NORMAL
SODIUM SERPL-SCNC: 139 MMOL/L (ref 135–145)
SP GR UR STRIP.AUTO: <=1.005
TROPONIN T SERPL-MCNC: 19 NG/L (ref 6–19)
WBC # BLD AUTO: 14.8 K/UL (ref 4.8–10.8)

## 2023-04-03 PROCEDURE — A9270 NON-COVERED ITEM OR SERVICE: HCPCS | Performed by: EMERGENCY MEDICINE

## 2023-04-03 PROCEDURE — 80053 COMPREHEN METABOLIC PANEL: CPT

## 2023-04-03 PROCEDURE — 71045 X-RAY EXAM CHEST 1 VIEW: CPT

## 2023-04-03 PROCEDURE — 85007 BL SMEAR W/DIFF WBC COUNT: CPT

## 2023-04-03 PROCEDURE — 86901 BLOOD TYPING SEROLOGIC RH(D): CPT

## 2023-04-03 PROCEDURE — 70496 CT ANGIOGRAPHY HEAD: CPT

## 2023-04-03 PROCEDURE — 85610 PROTHROMBIN TIME: CPT

## 2023-04-03 PROCEDURE — 70450 CT HEAD/BRAIN W/O DYE: CPT

## 2023-04-03 PROCEDURE — 700105 HCHG RX REV CODE 258: Performed by: EMERGENCY MEDICINE

## 2023-04-03 PROCEDURE — 36415 COLL VENOUS BLD VENIPUNCTURE: CPT

## 2023-04-03 PROCEDURE — 86900 BLOOD TYPING SEROLOGIC ABO: CPT

## 2023-04-03 PROCEDURE — 700117 HCHG RX CONTRAST REV CODE 255: Performed by: EMERGENCY MEDICINE

## 2023-04-03 PROCEDURE — 99285 EMERGENCY DEPT VISIT HI MDM: CPT

## 2023-04-03 PROCEDURE — 84484 ASSAY OF TROPONIN QUANT: CPT

## 2023-04-03 PROCEDURE — 700102 HCHG RX REV CODE 250 W/ 637 OVERRIDE(OP): Performed by: EMERGENCY MEDICINE

## 2023-04-03 PROCEDURE — 81003 URINALYSIS AUTO W/O SCOPE: CPT

## 2023-04-03 PROCEDURE — 93005 ELECTROCARDIOGRAM TRACING: CPT | Performed by: EMERGENCY MEDICINE

## 2023-04-03 PROCEDURE — 82962 GLUCOSE BLOOD TEST: CPT

## 2023-04-03 PROCEDURE — 85025 COMPLETE CBC W/AUTO DIFF WBC: CPT

## 2023-04-03 PROCEDURE — 86850 RBC ANTIBODY SCREEN: CPT

## 2023-04-03 PROCEDURE — 85730 THROMBOPLASTIN TIME PARTIAL: CPT

## 2023-04-03 PROCEDURE — 70498 CT ANGIOGRAPHY NECK: CPT

## 2023-04-03 PROCEDURE — 0042T CT-CEREBRAL PERFUSION ANALYSIS: CPT

## 2023-04-03 RX ORDER — SODIUM CHLORIDE 9 MG/ML
1000 INJECTION, SOLUTION INTRAVENOUS ONCE
Status: COMPLETED | OUTPATIENT
Start: 2023-04-03 | End: 2023-04-03

## 2023-04-03 RX ORDER — ACETAMINOPHEN 500 MG
1000 TABLET ORAL EVERY 6 HOURS PRN
COMMUNITY

## 2023-04-03 RX ORDER — LISINOPRIL 2.5 MG/1
2.5 TABLET ORAL DAILY
COMMUNITY

## 2023-04-03 RX ORDER — ACETAMINOPHEN 325 MG/1
975 TABLET ORAL ONCE
Status: COMPLETED | OUTPATIENT
Start: 2023-04-03 | End: 2023-04-03

## 2023-04-03 RX ADMIN — ACETAMINOPHEN 975 MG: 325 TABLET, FILM COATED ORAL at 14:41

## 2023-04-03 RX ADMIN — SODIUM CHLORIDE 1000 ML: 9 INJECTION, SOLUTION INTRAVENOUS at 14:41

## 2023-04-03 RX ADMIN — IOHEXOL 100 ML: 350 INJECTION, SOLUTION INTRAVENOUS at 13:55

## 2023-04-03 RX ADMIN — IOHEXOL 40 ML: 350 INJECTION, SOLUTION INTRAVENOUS at 13:53

## 2023-04-03 ASSESSMENT — FIBROSIS 4 INDEX: FIB4 SCORE: 1.14

## 2023-04-03 NOTE — ED NOTES
Pt ambulates to and from restroom  Clean catch urine sample collected and taken to lab  Pt reports headache is almost resolved and reports feeling better

## 2023-04-03 NOTE — ED NOTES
[facility]    Pt reports at 1130 she had blurred vision in both eyes and some confusion    Pt reports blurred vision for 1 month in am that resolves    Pt denies weakness, daughter at bedside reports pt struggles with balance on way to ER and almost fell backwards    Pt moves all extremities, A & 0 x 4 at this time

## 2023-04-03 NOTE — ED NOTES
Med rec updated and complete, per pt and pts daughter  Allergies reviewed, per pt  Interviewed pt with daughter at bedside with permission from pt.  Asked pt last time she took her medications, per daughter reports that she did take her LISINOPRIL 2.5MG today @ 1215.

## 2023-04-03 NOTE — ED PROVIDER NOTES
ED Provider Note    CHIEF COMPLAINT  Chief Complaint   Patient presents with    ALOC     Pt states has been feeling disoriented today started @ 1130 this am, was sitting at computer and was not sure what was doing    Possible Stroke     HA Started around 1130, comes and goes         EXTERNAL RECORDS REVIEWED  Reviewed extensive visit history laboratory studies imaging studies medication list    HPI/ROS  LIMITATION TO HISTORY   None  OUTSIDE HISTORIAN(S):  Adult daughters provided additional history for evaluation of an episode of brief confusion    Michelle Mckeon is a 87 y.o. female who presents.  She also reports mild headache.  Patient is accompanied by one of her adult daughters.  She had an episode earlier today around 11:30 AM in which she was translated confused, there was no clear evidence of word salad.  She did not have any obvious ataxia or focal numbness weakness tingling to the arms legs or face.  She did have mild headache.  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  She has been doing well since then.  She cannot recall any recent fall.  She denies any fevers chills chest pain abdominal pain.  No urinary symptoms such as dysuria or hematuria.  No report of any productive cough.  Symptoms of confusion have resolved on arrival here although she does report mild ongoing headache.    PAST MEDICAL HISTORY   has a past medical history of Arthritis, Chronic kidney disease, Disorder of thyroid, Fall, and Hypertension.    SURGICAL HISTORY   has a past surgical history that includes other orthopedic surgery; lumbar fusion o-arm (10/31/2022); lumbar laminectomy diskectomy (10/31/2022); foraminotomy (Bilateral, 10/31/2022); and lumbar decompression (10/31/2022).    FAMILY HISTORY  No family history on file.    SOCIAL HISTORY  Social History     Tobacco Use    Smoking status: Former     Packs/day: 0.25     Types: Cigarettes  "    Quit date: 1965     Years since quittin.2    Smokeless tobacco: Never   Vaping Use    Vaping Use: Never used   Substance and Sexual Activity    Alcohol use: Not Currently     Alcohol/week: 4.2 oz     Types: 7 Glasses of wine per week    Drug use: Never    Sexual activity: Not on file       CURRENT MEDICATIONS  Home Medications       Reviewed by Arian Jc (Pharmacy Tech) on 23 at 1331  Med List Status: Complete     Medication Last Dose Status   acetaminophen (TYLENOL) 500 MG Tab > 2 days Active   alendronate (FOSAMAX) 70 MG Tab 3/29/2023 Active   Cholecalciferol (D3 PO) 4/3/2023 Active   levothyroxine (SYNTHROID) 50 MCG Tab 4/3/2023 Active   levothyroxine (SYNTHROID) 75 MCG Tab 2023 Active   lisinopril (PRINIVIL) 2.5 MG Tab 4/3/2023 Active   MAGNESIUM PO 4/3/2023 Active   POTASSIUM PO 4/3/2023 Active   VITAMIN D, CHOLECALCIFEROL, PO  Active                    ALLERGIES  Allergies   Allergen Reactions    Sulfa Drugs Itching       PHYSICAL EXAM  VITAL SIGNS: Pulse 74   Temp 36.8 °C (98.2 °F) (Temporal)   Resp 15   Ht 1.575 m (5' 2\")   Wt 60.6 kg (133 lb 9.6 oz)   SpO2 97%   BMI 24.44 kg/m²    Pulse ox interpretation: I interpret this pulse ox as normal.  Constitutional: Alert and oriented x 3, no acute distress  HEENT: Atraumatic normocephalic, pupils are equal round reactive to light extraocular movements are intact. The nares is clear, external ears are normal, mouth shows moist mucous membranes normal dentition for age  Neck: Supple, no JVD no tracheal deviation  Cardiovascular: Regular rate and rhythm no murmur rub or gallop 2+ pulses peripherally x4  Thorax & Lungs: No respiratory distress, no wheezes rales or rhonchi, No chest tenderness.   GI: Soft nontender nondistended positive bowel sounds, no peritoneal signs  Skin: Warm dry no acute rash or lesion  Musculoskeletal: Moving all extremities with full range and 5 of 5 strength no acute  deformity  Neurologic: Cranial " nerves III through XII are grossly intact no sensory deficit no cerebellar dysfunction no pronator drift in the arms or legs finger-nose testing and is normal bilaterally.  Heel-to-shin testing is normal.  Speech patterns are normal comprehension is normal alert and oriented x4.  Visual fields grossly normal.  NIH of scale score of 0  Psychiatric: Appropriate affect for situation at this time          DIAGNOSTIC STUDIES / PROCEDURES    LABS  Results for orders placed or performed during the hospital encounter of 04/03/23   CBC WITH DIFFERENTIAL   Result Value Ref Range    WBC 14.8 (H) 4.8 - 10.8 K/uL    RBC 7.00 (H) 4.20 - 5.40 M/uL    Hemoglobin 16.8 (H) 12.0 - 16.0 g/dL    Hematocrit 51.6 (H) 37.0 - 47.0 %    MCV 73.7 (L) 81.4 - 97.8 fL    MCH 24.0 (L) 27.0 - 33.0 pg    MCHC 32.6 (L) 33.6 - 35.0 g/dL    RDW 39.9 35.9 - 50.0 fL    Platelet Count 208 164 - 446 K/uL    MPV 9.6 9.0 - 12.9 fL    Neutrophils-Polys 69.00 44.00 - 72.00 %    Lymphocytes 12.00 (L) 22.00 - 41.00 %    Monocytes 15.00 (H) 0.00 - 13.40 %    Eosinophils 0.00 0.00 - 6.90 %    Basophils 0.00 0.00 - 1.80 %    Nucleated RBC 0.00 /100 WBC    Neutrophils (Absolute) 10.80 (H) 2.00 - 7.15 K/uL    Lymphs (Absolute) 1.78 1.00 - 4.80 K/uL    Monos (Absolute) 2.22 (H) 0.00 - 0.85 K/uL    Eos (Absolute) 0.00 0.00 - 0.51 K/uL    Baso (Absolute) 0.00 0.00 - 0.12 K/uL    NRBC (Absolute) 0.00 K/uL   COMP METABOLIC PANEL   Result Value Ref Range    Sodium 139 135 - 145 mmol/L    Potassium 4.4 3.6 - 5.5 mmol/L    Chloride 100 96 - 112 mmol/L    Co2 26 20 - 33 mmol/L    Anion Gap 13.0 7.0 - 16.0    Glucose 91 65 - 99 mg/dL    Bun 22 8 - 22 mg/dL    Creatinine 1.12 0.50 - 1.40 mg/dL    Calcium 9.7 8.4 - 10.2 mg/dL    AST(SGOT) 16 12 - 45 U/L    ALT(SGPT) <5 2 - 50 U/L    Alkaline Phosphatase 75 30 - 99 U/L    Total Bilirubin 0.7 0.1 - 1.5 mg/dL    Albumin 4.8 3.2 - 4.9 g/dL    Total Protein 6.9 6.0 - 8.2 g/dL    Globulin 2.1 1.9 - 3.5 g/dL    A-G Ratio 2.3 g/dL    PROTHROMBIN TIME   Result Value Ref Range    PT 13.6 12.0 - 14.6 sec    INR 1.05 0.87 - 1.13   APTT   Result Value Ref Range    APTT 31.5 24.7 - 36.0 sec   COD (ADULT)   Result Value Ref Range    ABO Grouping Only O     Rh Grouping Only POS     Antibody Screen-Cod NEG    TROPONIN   Result Value Ref Range    Troponin T 19 6 - 19 ng/L   CORRECTED CALCIUM   Result Value Ref Range    Correct Calcium 9.1 8.5 - 10.5 mg/dL   DIFFERENTIAL MANUAL   Result Value Ref Range    Bands-Stabs 4.00 0.00 - 10.00 %    Manual Diff Status PERFORMED    URINALYSIS    Specimen: Urine   Result Value Ref Range    Color Yellow     Character Clear     Specific Gravity <=1.005 <1.035    Ph 7.0 5.0 - 8.0    Glucose Negative Negative mg/dL    Ketones Negative Negative mg/dL    Protein Negative Negative mg/dL    Bilirubin Negative Negative    Nitrite Negative Negative    Leukocyte Esterase Negative Negative    Occult Blood Negative Negative    Micro Urine Req see below    ESTIMATED GFR   Result Value Ref Range    GFR (CKD-EPI) 48 (A) >60 mL/min/1.73 m 2   EKG (NOW)   Result Value Ref Range    Report       [facility]    Test Date:  2023  Pt Name:    JIN OLMEDO               Department: [facility]  MRN:        [id number]                      Room:       -ROOM 6  Gender:     Female                       Technician: [id number]  :        [id number]                   Requested By:KIRSTIE MCGINNIS  Order #:    [id number]                    Reading MD:    Measurements  Intervals                                Axis  Rate:       72                           P:          33  MS:         175                          QRS:        7  QRSD:       81                           T:          16  QT:         406  QTc:        445    Interpretive Statements  Sinus rhythm  Ventricular premature complex  Sinus pause  Abnormal R-wave progression, early transition  Compared to ECG 2022 00:59:11  Ventricular premature complex(es)  now present  Sinus pause or arrest now present     POCT glucose device results   Result Value Ref Range    POC Glucose, Blood 76 65 - 99 mg/dL     EKG interpretation by me rate 72 sinus rhythm no acute ST segment elevation or depression possible poor R wave progression no pathological T wave inversion no evidence of heart block arrhythmia or ischemia    RADIOLOGY  I have independently interpreted the diagnostic imaging associated with this visit and am waiting the final reading from the radiologist.   My preliminary interpretation is as follows: No acute acute intracranial hemorrhage or large vessel occlusion chest x-ray no acute process  Radiologist interpretation:   CT-CTA NECK WITH & W/O-POST PROCESSING   Final Result      No focal high-grade stenosis, dissection or occlusion of the cervical carotid or vertebral arteries.      CT-CTA HEAD WITH & W/O-POST PROCESS   Final Result      No intracranial aneurysm, focal stenosis or abrupt large vessel cut off.      CT-CEREBRAL PERFUSION ANALYSIS   Final Result      1.  Cerebral blood flow less than 30% likely representing completed infarct = 0 mL.      2.  T Max more than 6 seconds likely representing combination of completed infarct and ischemia = 0 mL.      3.  Mismatched volume likely representing ischemic brain/penumbra = None      4.  Please note that the cerebral perfusion was performed on the limited brain tissue around the basal ganglia region. Infarct/ischemia outside the CT perfusion sections can be missed in this study.      DX-CHEST-PORTABLE (1 VIEW)   Final Result      No acute cardiopulmonary abnormality identified.      CT-HEAD W/O   Final Result      1.  Diffuse atrophy and white matter changes.   2.  No acute intracranial hemorrhage or territorial infarct.   3.  Small chronic RIGHT caudate lacunar infarct.             COURSE & MEDICAL DECISION MAKING    ED Observation Status? Yes; I am placing the patient in to an observation status due to a diagnostic  uncertainty as well as therapeutic intensity. Patient placed in observation status at 1:57 PM, 4/3/2023.     Observation plan is as follows: Tablets an IV, perform extensive testing including blood testing, urinalysis as well as extensive imaging studies including.  Stroke protocol including CT angiogram and perfusion studies of the brain, chest x-ray.  Perform serial neurological exams.  Administer IV fluids and Tylenol for headache    Upon Reevaluation, the patient's condition has: Improved; and will be discharged.    Patient discharged from ED Observation status at 1700 (Time) 4/3/23 (Date).     INITIAL ASSESSMENT, COURSE AND PLAN  Care Narrative:    This is a very pleasant 87-year-old female accompanied by family who had a transient episode of confusion earlier today and now developed a mild headache.  Differential diagnosis was extensive but not exclusive of acute large vessel occlusion, possible TIA, migraine, cerebral aneurysm, dehydration urinary tract infection pneumonia.  Patient had a reassuring and normal neuro exam on arrival.  Specifically her NIH score is 0.  With the transient confusion I did activate full stroke work-up which is reassuring.  Her CT scan did not demonstrate any acute process such as hemorrhage or evolving stroke or large vessel occlusion or subdural hemorrhage.  Her laboratory studies were notable only for mild leukocytosis however her lungs are clear she has no hypoxia or abdominal pain no skin findings to suggest infection and urinalysis is clear.  She was observed for several hours and given Tylenol and IV fluids.  Her symptoms completely resolved including her headache.  She feels significantly improved.  I repeated her neurological exam and her NIH stroke scale score remains 0.  She is ambulatory without ataxia.  I had a long talk with the patient and her family regarding today's findings.  We did discuss possibly admitting her for observation and possible work-up for TIA  although without any clear focal neurological deficits I think it is unlikely that she had high risk TIA.  I have advised him to return as needed for new or worsening symptoms      HYDRATION: Based on the patient's presentation of Dehydration the patient was given IV fluids. IV Hydration was used because oral hydration was not adequate alone. Upon recheck following hydration, the patient was improved.      ADDITIONAL PROBLEM LIST    DISPOSITION AND DISCUSSIONS  I have discussed management of the patient with the following physicians and MARIZOL's: None    Discussion of management with other QHP or appropriate source(s): None    Escalation of care considered, and ultimately not performed:acute inpatient care management, however at this time, the patient is most appropriate for outpatient management    Barriers to care at this time, including but not limited to: None    Decision tools and prescription drugs considered including, but not limited to: NIH Stroke Scale is 0 .    FINAL DIAGNOSIS  Transient confusion  Cephalalgia  Leukocytosis       Electronically signed by: Danielito Seth M.D., 4/3/2023 1:41 PM

## 2023-04-03 NOTE — ED TRIAGE NOTES
"Chief Complaint   Patient presents with    ALOC     Pt states has been feeling disoriented today started @ 1130 this am, was sitting at computer and was not sure what was doing    Possible Stroke     HA Started around 1130, comes and goes       Pulse 74   Temp 36.8 °C (98.2 °F) (Temporal)   Resp 15   Ht 1.575 m (5' 2\")   Wt 60.6 kg (133 lb 9.6 oz)   SpO2 97%   BMI 24.44 kg/m²     Pt ambulated to ED w/ visitors for c/o feeling \"disoriented\" this am while working at [employer]  Visitor states first noticed around 1130.  Pt stated \"I couldn't think straight or get my words out.\"  Pt c/o \"slight HA,\" started at same time.    "

## 2023-04-04 NOTE — ED NOTES
Pt discharged, reviewed all discharge instructions including follow up, pt verbalizes understanding, and denies questions.   Escorted to lobby with daughter. No belongings left in room.

## 2023-04-19 ENCOUNTER — APPOINTMENT (OUTPATIENT)
Dept: RADIOLOGY | Facility: MEDICAL CENTER | Age: 88
End: 2023-04-19
Attending: FAMILY MEDICINE
Payer: MEDICARE

## 2023-04-19 DIAGNOSIS — R22.1 NECK MASS: ICD-10-CM

## 2023-04-19 PROCEDURE — 76536 US EXAM OF HEAD AND NECK: CPT

## 2023-05-04 ENCOUNTER — HOSPITAL ENCOUNTER (OUTPATIENT)
Dept: LAB | Facility: MEDICAL CENTER | Age: 88
End: 2023-05-04
Attending: FAMILY MEDICINE
Payer: MEDICARE

## 2023-05-04 LAB
ALBUMIN SERPL BCP-MCNC: 4.9 G/DL (ref 3.2–4.9)
ALBUMIN/GLOB SERPL: 2.3 G/DL
ALP SERPL-CCNC: 77 U/L (ref 30–99)
ALT SERPL-CCNC: 9 U/L (ref 2–50)
ANION GAP SERPL CALC-SCNC: 15 MMOL/L (ref 7–16)
ANISOCYTOSIS BLD QL SMEAR: ABNORMAL
AST SERPL-CCNC: 17 U/L (ref 12–45)
BASOPHILS # BLD AUTO: 0 % (ref 0–1.8)
BASOPHILS # BLD: 0 K/UL (ref 0–0.12)
BILIRUB SERPL-MCNC: 0.7 MG/DL (ref 0.1–1.5)
BUN SERPL-MCNC: 21 MG/DL (ref 8–22)
CALCIUM ALBUM COR SERPL-MCNC: 8.8 MG/DL (ref 8.5–10.5)
CALCIUM SERPL-MCNC: 9.5 MG/DL (ref 8.5–10.5)
CHLORIDE SERPL-SCNC: 102 MMOL/L (ref 96–112)
CHOLEST SERPL-MCNC: 221 MG/DL (ref 100–199)
CO2 SERPL-SCNC: 22 MMOL/L (ref 20–33)
CREAT SERPL-MCNC: 0.99 MG/DL (ref 0.5–1.4)
EOSINOPHIL # BLD AUTO: 0 K/UL (ref 0–0.51)
EOSINOPHIL NFR BLD: 0 % (ref 0–6.9)
ERYTHROCYTE [DISTWIDTH] IN BLOOD BY AUTOMATED COUNT: 47.9 FL (ref 35.9–50)
EST. AVERAGE GLUCOSE BLD GHB EST-MCNC: 131 MG/DL
FASTING STATUS PATIENT QL REPORTED: NORMAL
GFR SERPLBLD CREATININE-BSD FMLA CKD-EPI: 55 ML/MIN/1.73 M 2
GLOBULIN SER CALC-MCNC: 2.1 G/DL (ref 1.9–3.5)
GLUCOSE SERPL-MCNC: 112 MG/DL (ref 65–99)
HBA1C MFR BLD: 6.2 % (ref 4–5.6)
HCT VFR BLD AUTO: 55.9 % (ref 37–47)
HDLC SERPL-MCNC: 137 MG/DL
HGB BLD-MCNC: 17.2 G/DL (ref 12–16)
LDLC SERPL CALC-MCNC: 73 MG/DL
LYMPHOCYTES # BLD AUTO: 1.62 K/UL (ref 1–4.8)
LYMPHOCYTES NFR BLD: 11.2 % (ref 22–41)
MANUAL DIFF BLD: NORMAL
MCH RBC QN AUTO: 23 PG (ref 27–33)
MCHC RBC AUTO-ENTMCNC: 30.8 G/DL (ref 33.6–35)
MCV RBC AUTO: 74.6 FL (ref 81.4–97.8)
METAMYELOCYTES NFR BLD MANUAL: 1.7 %
MICROCYTES BLD QL SMEAR: ABNORMAL
MONOCYTES # BLD AUTO: 2.62 K/UL (ref 0–0.85)
MONOCYTES NFR BLD AUTO: 18.1 % (ref 0–13.4)
MORPHOLOGY BLD-IMP: NORMAL
NEUTROPHILS # BLD AUTO: 10.01 K/UL (ref 2–7.15)
NEUTROPHILS NFR BLD: 69 % (ref 44–72)
NRBC # BLD AUTO: 0 K/UL
NRBC BLD-RTO: 0 /100 WBC
PLATELET # BLD AUTO: 234 K/UL (ref 164–446)
PLATELET BLD QL SMEAR: NORMAL
POTASSIUM SERPL-SCNC: 4.6 MMOL/L (ref 3.6–5.5)
PROT SERPL-MCNC: 7 G/DL (ref 6–8.2)
RBC # BLD AUTO: 7.49 M/UL (ref 4.2–5.4)
RBC BLD AUTO: PRESENT
SODIUM SERPL-SCNC: 139 MMOL/L (ref 135–145)
TRIGL SERPL-MCNC: 54 MG/DL (ref 0–149)
WBC # BLD AUTO: 14.5 K/UL (ref 4.8–10.8)

## 2023-05-04 PROCEDURE — 80053 COMPREHEN METABOLIC PANEL: CPT

## 2023-05-04 PROCEDURE — 85007 BL SMEAR W/DIFF WBC COUNT: CPT

## 2023-05-04 PROCEDURE — 36415 COLL VENOUS BLD VENIPUNCTURE: CPT

## 2023-05-04 PROCEDURE — 85025 COMPLETE CBC W/AUTO DIFF WBC: CPT

## 2023-05-04 PROCEDURE — 83036 HEMOGLOBIN GLYCOSYLATED A1C: CPT | Mod: GA

## 2023-05-04 PROCEDURE — 80061 LIPID PANEL: CPT

## 2023-07-11 ENCOUNTER — HOSPITAL ENCOUNTER (OUTPATIENT)
Dept: LAB | Facility: MEDICAL CENTER | Age: 88
End: 2023-07-11
Attending: FAMILY MEDICINE
Payer: MEDICARE

## 2023-07-11 LAB
BASOPHILS # BLD AUTO: 0.8 % (ref 0–1.8)
BASOPHILS # BLD: 0.08 K/UL (ref 0–0.12)
EOSINOPHIL # BLD AUTO: 0.16 K/UL (ref 0–0.51)
EOSINOPHIL NFR BLD: 1.5 % (ref 0–6.9)
ERYTHROCYTE [DISTWIDTH] IN BLOOD BY AUTOMATED COUNT: 56 FL (ref 35.9–50)
FOLATE SERPL-MCNC: 22.5 NG/ML
HCT VFR BLD AUTO: 61.2 % (ref 37–47)
HGB BLD-MCNC: 19.2 G/DL (ref 12–16)
IMM GRANULOCYTES # BLD AUTO: 0.51 K/UL (ref 0–0.11)
IMM GRANULOCYTES NFR BLD AUTO: 4.8 % (ref 0–0.9)
LDH SERPL L TO P-CCNC: 245 U/L (ref 107–266)
LYMPHOCYTES # BLD AUTO: 1.21 K/UL (ref 1–4.8)
LYMPHOCYTES NFR BLD: 11.4 % (ref 22–41)
MCH RBC QN AUTO: 24.4 PG (ref 27–33)
MCHC RBC AUTO-ENTMCNC: 31.4 G/DL (ref 32.2–35.5)
MCV RBC AUTO: 77.9 FL (ref 81.4–97.8)
MONOCYTES # BLD AUTO: 1.85 K/UL (ref 0–0.85)
MONOCYTES NFR BLD AUTO: 17.4 % (ref 0–13.4)
NEUTROPHILS # BLD AUTO: 6.85 K/UL (ref 1.82–7.42)
NEUTROPHILS NFR BLD: 64.1 % (ref 44–72)
NRBC # BLD AUTO: 0 K/UL
NRBC BLD-RTO: 0 /100 WBC (ref 0–0.2)
PLATELET # BLD AUTO: 191 K/UL (ref 164–446)
RBC # BLD AUTO: 7.86 M/UL (ref 4.2–5.4)
VIT B12 SERPL-MCNC: 682 PG/ML (ref 211–911)
WBC # BLD AUTO: 10.7 K/UL (ref 4.8–10.8)

## 2023-07-11 PROCEDURE — 83615 LACTATE (LD) (LDH) ENZYME: CPT

## 2023-07-11 PROCEDURE — 36415 COLL VENOUS BLD VENIPUNCTURE: CPT

## 2023-07-11 PROCEDURE — 82607 VITAMIN B-12: CPT

## 2023-07-11 PROCEDURE — 85025 COMPLETE CBC W/AUTO DIFF WBC: CPT

## 2023-07-11 PROCEDURE — 82746 ASSAY OF FOLIC ACID SERUM: CPT

## 2023-08-02 ENCOUNTER — HOSPITAL ENCOUNTER (OUTPATIENT)
Dept: RADIOLOGY | Facility: MEDICAL CENTER | Age: 88
End: 2023-08-02
Attending: FAMILY MEDICINE
Payer: MEDICARE

## 2023-08-02 DIAGNOSIS — Z12.31 VISIT FOR SCREENING MAMMOGRAM: ICD-10-CM

## 2023-08-02 DIAGNOSIS — M85.89 DISAPPEARING BONE DISEASE: ICD-10-CM

## 2023-08-02 PROCEDURE — 77063 BREAST TOMOSYNTHESIS BI: CPT

## 2023-08-02 PROCEDURE — 77080 DXA BONE DENSITY AXIAL: CPT

## 2023-08-16 ENCOUNTER — HOSPITAL ENCOUNTER (EMERGENCY)
Facility: MEDICAL CENTER | Age: 88
End: 2023-08-17
Attending: EMERGENCY MEDICINE
Payer: MEDICARE

## 2023-08-16 DIAGNOSIS — R42 DIZZINESS: ICD-10-CM

## 2023-08-16 LAB
ALBUMIN SERPL BCP-MCNC: 4.3 G/DL (ref 3.2–4.9)
ALBUMIN/GLOB SERPL: 2.3 G/DL
ALP SERPL-CCNC: 62 U/L (ref 30–99)
ALT SERPL-CCNC: <5 U/L (ref 2–50)
ANION GAP SERPL CALC-SCNC: 13 MMOL/L (ref 7–16)
APPEARANCE UR: CLEAR
AST SERPL-CCNC: 13 U/L (ref 12–45)
BASOPHILS # BLD AUTO: 0 % (ref 0–1.8)
BASOPHILS # BLD: 0 K/UL (ref 0–0.12)
BILIRUB SERPL-MCNC: 0.8 MG/DL (ref 0.1–1.5)
BILIRUB UR QL STRIP.AUTO: NEGATIVE
BUN SERPL-MCNC: 17 MG/DL (ref 8–22)
CALCIUM ALBUM COR SERPL-MCNC: 9.3 MG/DL (ref 8.5–10.5)
CALCIUM SERPL-MCNC: 9.5 MG/DL (ref 8.4–10.2)
CHLORIDE SERPL-SCNC: 106 MMOL/L (ref 96–112)
CO2 SERPL-SCNC: 23 MMOL/L (ref 20–33)
COLOR UR: YELLOW
CREAT SERPL-MCNC: 1.13 MG/DL (ref 0.5–1.4)
EKG IMPRESSION: NORMAL
EOSINOPHIL # BLD AUTO: 0 K/UL (ref 0–0.51)
EOSINOPHIL NFR BLD: 0 % (ref 0–6.9)
ERYTHROCYTE [DISTWIDTH] IN BLOOD BY AUTOMATED COUNT: 49.4 FL (ref 35.9–50)
GFR SERPLBLD CREATININE-BSD FMLA CKD-EPI: 47 ML/MIN/1.73 M 2
GLOBULIN SER CALC-MCNC: 1.9 G/DL (ref 1.9–3.5)
GLUCOSE SERPL-MCNC: 97 MG/DL (ref 65–99)
GLUCOSE UR STRIP.AUTO-MCNC: NEGATIVE MG/DL
HCT VFR BLD AUTO: 57.5 % (ref 37–47)
HGB BLD-MCNC: 18.3 G/DL (ref 12–16)
KETONES UR STRIP.AUTO-MCNC: NEGATIVE MG/DL
LACTATE SERPL-SCNC: 1.1 MMOL/L (ref 0.5–2)
LEUKOCYTE ESTERASE UR QL STRIP.AUTO: NEGATIVE
LYMPHOCYTES # BLD AUTO: 1.61 K/UL (ref 1–4.8)
LYMPHOCYTES NFR BLD: 12 % (ref 22–41)
MAGNESIUM SERPL-MCNC: 2.1 MG/DL (ref 1.5–2.5)
MANUAL DIFF BLD: NORMAL
MCH RBC QN AUTO: 24.2 PG (ref 27–33)
MCHC RBC AUTO-ENTMCNC: 31.8 G/DL (ref 32.2–35.5)
MCV RBC AUTO: 76.1 FL (ref 81.4–97.8)
MICRO URNS: NORMAL
MONOCYTES # BLD AUTO: 3.08 K/UL (ref 0–0.85)
MONOCYTES NFR BLD AUTO: 23 % (ref 0–13.4)
NEUTROPHILS # BLD AUTO: 8.71 K/UL (ref 1.82–7.42)
NEUTROPHILS NFR BLD: 62 % (ref 44–72)
NEUTS BAND NFR BLD MANUAL: 3 % (ref 0–10)
NITRITE UR QL STRIP.AUTO: NEGATIVE
NRBC # BLD AUTO: 0 K/UL
NRBC BLD-RTO: 0 /100 WBC (ref 0–0.2)
PH UR STRIP.AUTO: 5.5 [PH] (ref 5–8)
PLATELET # BLD AUTO: 214 K/UL (ref 164–446)
PLATELET BLD QL SMEAR: NORMAL
PMV BLD AUTO: 10 FL (ref 9–12.9)
POTASSIUM SERPL-SCNC: 4.1 MMOL/L (ref 3.6–5.5)
PROCALCITONIN SERPL-MCNC: 0.07 NG/ML
PROT SERPL-MCNC: 6.2 G/DL (ref 6–8.2)
PROT UR QL STRIP: NEGATIVE MG/DL
RBC # BLD AUTO: 7.56 M/UL (ref 4.2–5.4)
RBC BLD AUTO: NORMAL
RBC UR QL AUTO: NEGATIVE
SODIUM SERPL-SCNC: 142 MMOL/L (ref 135–145)
SP GR UR STRIP.AUTO: >=1.03
TROPONIN T SERPL-MCNC: 20 NG/L (ref 6–19)
TSH SERPL DL<=0.005 MIU/L-ACNC: 2.74 UIU/ML (ref 0.38–5.33)
WBC # BLD AUTO: 13.4 K/UL (ref 4.8–10.8)

## 2023-08-16 PROCEDURE — 36415 COLL VENOUS BLD VENIPUNCTURE: CPT

## 2023-08-16 PROCEDURE — 80053 COMPREHEN METABOLIC PANEL: CPT

## 2023-08-16 PROCEDURE — 81003 URINALYSIS AUTO W/O SCOPE: CPT

## 2023-08-16 PROCEDURE — 85007 BL SMEAR W/DIFF WBC COUNT: CPT

## 2023-08-16 PROCEDURE — 84443 ASSAY THYROID STIM HORMONE: CPT

## 2023-08-16 PROCEDURE — 84484 ASSAY OF TROPONIN QUANT: CPT

## 2023-08-16 PROCEDURE — 85025 COMPLETE CBC W/AUTO DIFF WBC: CPT

## 2023-08-16 PROCEDURE — 84145 PROCALCITONIN (PCT): CPT

## 2023-08-16 PROCEDURE — 87040 BLOOD CULTURE FOR BACTERIA: CPT

## 2023-08-16 PROCEDURE — 83605 ASSAY OF LACTIC ACID: CPT

## 2023-08-16 PROCEDURE — 93005 ELECTROCARDIOGRAM TRACING: CPT | Performed by: EMERGENCY MEDICINE

## 2023-08-16 PROCEDURE — 83735 ASSAY OF MAGNESIUM: CPT

## 2023-08-16 PROCEDURE — 99284 EMERGENCY DEPT VISIT MOD MDM: CPT

## 2023-08-16 PROCEDURE — 87086 URINE CULTURE/COLONY COUNT: CPT

## 2023-08-16 ASSESSMENT — FIBROSIS 4 INDEX: FIB4 SCORE: 2.55

## 2023-08-17 ENCOUNTER — APPOINTMENT (OUTPATIENT)
Dept: RADIOLOGY | Facility: MEDICAL CENTER | Age: 88
End: 2023-08-17
Attending: EMERGENCY MEDICINE
Payer: MEDICARE

## 2023-08-17 VITALS
DIASTOLIC BLOOD PRESSURE: 76 MMHG | HEIGHT: 62 IN | WEIGHT: 126.76 LBS | SYSTOLIC BLOOD PRESSURE: 145 MMHG | OXYGEN SATURATION: 93 % | BODY MASS INDEX: 23.33 KG/M2 | RESPIRATION RATE: 17 BRPM | TEMPERATURE: 97.6 F | HEART RATE: 77 BPM

## 2023-08-17 LAB
LACTATE SERPL-SCNC: 0.8 MMOL/L (ref 0.5–2)
TROPONIN T SERPL-MCNC: 15 NG/L (ref 6–19)

## 2023-08-17 PROCEDURE — 71045 X-RAY EXAM CHEST 1 VIEW: CPT

## 2023-08-17 PROCEDURE — 84484 ASSAY OF TROPONIN QUANT: CPT

## 2023-08-17 PROCEDURE — 83605 ASSAY OF LACTIC ACID: CPT

## 2023-08-17 NOTE — ED TRIAGE NOTES
"Pt ambulated with a wobbly gait to triage with the following c/o    Chief Complaint   Patient presents with    Dizziness    UTI     Pt was diagnosed with a UTI a month ago and antibiotics are not working. Pt was tested again a week ago and told she has another UTI and started on another medication. Pt daughter states that pt is not acting like herself.     ALOC     Pt is AOX4 but is having a hard time coming up with answers to simple question. Pt daughter states she is not acting like herself.      /69   Pulse 77   Temp 36.4 °C (97.5 °F) (Temporal)   Resp 16   Ht 1.575 m (5' 2\")   Wt 57.5 kg (126 lb 12.2 oz)   SpO2 94%   BMI 23.19 kg/m²     Pt take to get an triage ekg done.       "

## 2023-08-17 NOTE — ED NOTES
In and out cath done patient tolerated well urine to lab.  Patient and daughter updated on plan of care

## 2023-08-17 NOTE — ED PROVIDER NOTES
"                                                        ED Provider Note    CHIEF COMPLAINT  Chief Complaint   Patient presents with    Dizziness    UTI     Pt was diagnosed with a UTI a month ago and antibiotics are not working. Pt was tested again a week ago and told she has another UTI and started on another medication. Pt daughter states that pt is not acting like herself.     ALOC     Pt is AOX4 but is having a hard time coming up with answers to simple question. Pt daughter states she is not acting like herself.         [facility]    Primary care provider: Eliane Lloyd M.D.   History obtained from: Patient and daughter  History limited by: None     Michelle Mckeon is a 87 y.o. female who presents to the ED with daughter due to concern regarding continued UTI.  Daughter reports that patient was diagnosed with UTI about a month ago and finished a course of antibiotic but does not remember which antibiotic but never completely felt better and finished a 1 week course of Cipro recently but daughter reports that patient does not show any improvement.  Daughter reports that patient has been complaining of dizziness and patient reports that she has dizziness \"all the time.\"  Daughter also reports that patient was \"talking slow\" today and \"acting very fidgety.\"  There has been no fever at home.  Patient denies chills.  She denies shortness of breath/difficulty breathing/cough.  She has occasional nausea without vomiting.  She reports occasional constipation.  Patient denies any urinary symptoms including discomfort or frequency.  No rash noted.  She denies any pain at this time except for chronic back pain which is unchanged.    REVIEW OF SYSTEMS  Please see HPI for pertinent positives/negatives.  All other systems reviewed and are negative.     PAST MEDICAL HISTORY  Past Medical History:   Diagnosis Date    Arthritis     Chronic kidney disease     Disorder of thyroid     Fall     Hypertension         SURGICAL " "HISTORY  Past Surgical History:   Procedure Laterality Date    LUMBAR FUSION O-ARM  10/31/2022    Procedure: FUSION, SPINE, LIMBAR, TLIF, WITH O-ARM GUIDANCE L3-5 ;  Surgeon: Tammy Rowland M.D.;  Location: SURGERY [facility];  Service: Neurosurgery    LUMBAR LAMINECTOMY DISKECTOMY  10/31/2022    Procedure: LAMINECTOMY, SPINE, LUMBAR, L3-5;  Surgeon: Tammy Rowland M.D.;  Location: SURGERY [facility];  Service: Neurosurgery    FORAMINOTOMY Bilateral 10/31/2022    Procedure: FORAMINOTOMY, SPINE;  Surgeon: Tammy Rowland M.D.;  Location: SURGERY [facility];  Service: Neurosurgery    LUMBAR DECOMPRESSION  10/31/2022    Procedure: DECOMPRESSION, SPINE, LUMBAR L3-4, 4-5;  Surgeon: Tammy Rowland M.D.;  Location: SURGERY [facility];  Service: Neurosurgery    OTHER ORTHOPEDIC SURGERY      Blateral knee replacements        SOCIAL HISTORY  Social History     Tobacco Use    Smoking status: Former     Packs/day: .25     Types: Cigarettes     Quit date: 1965     Years since quittin.6    Smokeless tobacco: Never   Vaping Use    Vaping Use: Never used   Substance and Sexual Activity    Alcohol use: Not Currently     Alcohol/week: 4.2 oz     Types: 7 Glasses of wine per week    Drug use: Never    Sexual activity: Not on file        FAMILY HISTORY  No family history on file.     CURRENT MEDICATIONS  Home Medications    **Home medications have not yet been reviewed for this encounter**          ALLERGIES  Allergies   Allergen Reactions    Sulfa Drugs Itching        PHYSICAL EXAM  VITAL SIGNS: BP (!) 145/76   Pulse 77   Temp 36.4 °C (97.6 °F) (Temporal)   Resp 17   Ht 1.575 m (5' 2\")   Wt 57.5 kg (126 lb 12.2 oz)   SpO2 93%   BMI 23.19 kg/m²  @COLLEEN[2001::@     Pulse ox interpretation: 93% I interpret this pulse ox as normal     Cardiac monitor interpretation: Sinus rhythm with heart rate in the 70s as interpreted by me.  The patient presented with dizziness and cardiac monitor was ordered to monitor for " dysrhythmia.    Constitutional: Well developed, well nourished, alert in no apparent distress, nontoxic appearance    HENT: No external signs of trauma, normocephalic, oropharynx moist and clear   Eyes: PERRL, conjunctiva without erythema, no discharge, no icterus    Neck: Soft and supple, trachea midline, no stridor, no tenderness, no LAD, good ROM    Cardiovascular: Regular rate and rhythm, no murmurs/rubs/gallops, strong distal pulses and good perfusion    Thorax & Lungs: No respiratory distress, CTAB    Abdomen: Soft, nontender, nondistended, no guarding, no rebound, normal BS    Back: No CVAT     Extremities: No cyanosis, no edema, no gross deformity, good ROM, intact distal pulses with brisk cap refill    Skin: Warm, dry, no pallor/cyanosis, no rash noted      Neuro: Alert and oriented to person, place, and time.  GCS 15.  CN II-XII grossly intact.  Normal speech.  Equal strength bilateral UE/LE.  Sensation intact to touch.  No cerebellar signs.    Psychiatric: Cooperative, normal mood and affect, normal judgement, appropriate for clinical situation        NIH STROKE SCALE    1A: Level of Consciousness=0  Alert; keenly responsive 0  Arouses to minor stimulation +1  Requires repeated stimulation to arouse +2  Movements to pain +2  Postures or unresponsive +3    1B: Ask Month and Age=0  Both questions right 0  1 question right +1  0 questions right +2  Dysarthric/intubated/trauma/language barrier +1  Aphasic +2    1C: 'Blink Eyes' & 'Squeeze Hands'=0  (Pantomime commands if communication barrier)  Performs both tasks 0  Performs 1 task +1  Performs 0 tasks +2    2: Test Horizontal Extraocular Movements=0  Normal 0  Partial gaze palsy: can be overcome +1  Partial gaze palsy: corrects with oculocephalic reflex +1  Forced gaze palsy: cannot be overcome +2    3: Test Visual Fields=0  No visual loss 0  Partial hemianopia +1  Complete hemianopia +2  Patient is bilaterally blind +3  Bilateral hemianopia +3    4: Test  Facial Palsy=0  (Use grimace if obtunded)  Normal symmetry 0  Minor paralysis (flat nasolabial fold, smile asymetry) +1  Partial paralysis (lower face) +2  Unilateral complete paralysis (upper/lower face) +3  Bilateral complete paralysis (upper/lower face) +3    5A: Test Left Arm Motor Drift=0  No drift for 10 Seconds 0  Drift, but doesn't hit bed +1  Drift, hits bed +2  Some effort against gravity +2  No effort against gravity +3  No movement +4  Amputation/joint fusion 0    5B: Test Right Arm Motor Drift=0  No drift for 10 seconds 0  Drift, but doesn't hit bed +1  Drift, hits bed +2  Some effort against gravity +2  No effort against gravity +3  No movement +4  Amputation/joint fusion 0    6A: Test Left Leg Motor Drift=0  No drift for 5 Seconds 0  Drift, but doesn't hit bed +1  Drift, hits bed +2  Some effort against gravity +2  No effort against gravity +3  No movement +4  Amputation/joint fusion 0    6B: Test Right Leg Motor Drift=0  No drift for 5 Seconds 0  Drift, but doesn't hit bed +1  Drift, hits bed +2  Some effort against gravity +2  No effort against gravity +3  No movement +4  Amputation/joint fusion 0    7: Test Limb Ataxia =0  (FNF/Heel-Shin)  No ataxia 0  Ataxia in 1 limb +1  Ataxia in 2 limbs +2  Does not understand 0  Paralyzed 0  Amputation/joint fusion 0    8: Test Sensation=0  Normal; no sensory loss 0  Mild-moderate loss: less sharp/more dull +1  Mild-moderate loss: can sense being touched +1  Complete loss: cannot sense being touched at all +2  No response and quadriplegic +2  Coma/unresponsive +2    9: Test Language/Aphasia=0  Normal; no aphasia 0  Mild-moderate aphasia: some obvious changes, without significant limitation +1  Severe aphasia: fragmentary expression, inference needed, cannot identify materials +2  Mute/global aphasia: no usable speech/auditory comprehension +3  Coma/unresponsive +3    10: Test Dysarthria=0  Normal 0  Mild-moderate dysarthria: slurring but can be understood  +1  Severe dysarthria: unintelligble slurring or out of proportion to dysphasia +2  Mute/anarthric +2  Intubated/unable to test 0    11: Test Extinction/Inattention=0  No abnormality 0  Visual/tactile/auditory/spatial/personal inattention +1  Extinction to bilateral simultaneous stimulation +1  Profound pablo-inattention (ex: does not recognize own hand) +2  Extinction to >1 modality +2      NIH Stroke Scale=0      DIAGNOSTIC STUDIES / PROCEDURES    EKG  12 Lead EKG obtained at 2000 and interpreted by me:   Rate: 71   Rhythm: Sinus rhythm   Ectopy: None  Intervals: Normal   Axis: Normal   QRS: Early precordial R wave transition  ST segments: Normal  T Waves: Normal    Clinical Impression: Sinus rhythm without acute ischemic changes or dysrhythmia  Compared to April 3, 2023 without significant change      LABS  All labs reviewed by me.     Results for orders placed or performed during the hospital encounter of 08/16/23   Urinalysis, Culture if Indicated    Specimen: Urine   Result Value Ref Range    Color Yellow     Character Clear     Specific Gravity >=1.030 <1.035    Ph 5.5 5.0 - 8.0    Glucose Negative Negative mg/dL    Ketones Negative Negative mg/dL    Protein Negative Negative mg/dL    Bilirubin Negative Negative    Nitrite Negative Negative    Leukocyte Esterase Negative Negative    Occult Blood Negative Negative    Micro Urine Req see below    Lactic acid (lactate)   Result Value Ref Range    Lactic Acid 1.1 0.5 - 2.0 mmol/L   CBC With Differential   Result Value Ref Range    WBC 13.4 (H) 4.8 - 10.8 K/uL    RBC 7.56 (H) 4.20 - 5.40 M/uL    Hemoglobin 18.3 (H) 12.0 - 16.0 g/dL    Hematocrit 57.5 (H) 37.0 - 47.0 %    MCV 76.1 (L) 81.4 - 97.8 fL    MCH 24.2 (L) 27.0 - 33.0 pg    MCHC 31.8 (L) 32.2 - 35.5 g/dL    RDW 49.4 35.9 - 50.0 fL    Platelet Count 214 164 - 446 K/uL    MPV 10.0 9.0 - 12.9 fL    Neutrophils-Polys 62.00 44.00 - 72.00 %    Lymphocytes 12.00 (L) 22.00 - 41.00 %    Monocytes 23.00 (H) 0.00 -  13.40 %    Eosinophils 0.00 0.00 - 6.90 %    Basophils 0.00 0.00 - 1.80 %    Nucleated RBC 0.00 0.00 - 0.20 /100 WBC    Neutrophils (Absolute) 8.71 (H) 1.82 - 7.42 K/uL    Lymphs (Absolute) 1.61 1.00 - 4.80 K/uL    Monos (Absolute) 3.08 (H) 0.00 - 0.85 K/uL    Eos (Absolute) 0.00 0.00 - 0.51 K/uL    Baso (Absolute) 0.00 0.00 - 0.12 K/uL    NRBC (Absolute) 0.00 K/uL   Comp Metabolic Panel   Result Value Ref Range    Sodium 142 135 - 145 mmol/L    Potassium 4.1 3.6 - 5.5 mmol/L    Chloride 106 96 - 112 mmol/L    Co2 23 20 - 33 mmol/L    Anion Gap 13.0 7.0 - 16.0    Glucose 97 65 - 99 mg/dL    Bun 17 8 - 22 mg/dL    Creatinine 1.13 0.50 - 1.40 mg/dL    Calcium 9.5 8.4 - 10.2 mg/dL    Correct Calcium 9.3 8.5 - 10.5 mg/dL    AST(SGOT) 13 12 - 45 U/L    ALT(SGPT) <5 2 - 50 U/L    Alkaline Phosphatase 62 30 - 99 U/L    Total Bilirubin 0.8 0.1 - 1.5 mg/dL    Albumin 4.3 3.2 - 4.9 g/dL    Total Protein 6.2 6.0 - 8.2 g/dL    Globulin 1.9 1.9 - 3.5 g/dL    A-G Ratio 2.3 g/dL   PROCALCITONIN   Result Value Ref Range    Procalcitonin 0.07 <0.25 ng/mL   TSH WITH REFLEX TO FT4   Result Value Ref Range    TSH 2.740 0.380 - 5.330 uIU/mL   ESTIMATED GFR   Result Value Ref Range    GFR (CKD-EPI) 47 (A) >60 mL/min/1.73 m 2   DIFFERENTIAL MANUAL   Result Value Ref Range    Bands-Stabs 3.00 0.00 - 10.00 %    Manual Diff Status PERFORMED    PLATELET ESTIMATE   Result Value Ref Range    Plt Estimation Normal    MORPHOLOGY   Result Value Ref Range    RBC Morphology Normal    MAGNESIUM   Result Value Ref Range    Magnesium 2.1 1.5 - 2.5 mg/dL   TROPONIN   Result Value Ref Range    Troponin T 20 (H) 6 - 19 ng/L   TROPONIN   Result Value Ref Range    Troponin T 15 6 - 19 ng/L   LACTIC ACID   Result Value Ref Range    Lactic Acid 0.8 0.5 - 2.0 mmol/L   EKG   Result Value Ref Range    Report       [facility] Emergency Dept.    Test Date:  2023-08-16  Pt Name:    JIN HONORIO               Department: E[facility]  MRN:         [id number]                      Room:  Gender:     Female                       Technician: JULIANNA  :        [id number]                   Requested By:ER TRIAGE PROTOCOL  Order #:    [id number]                    Reading MD:    Measurements  Intervals                                Axis  Rate:       71                           P:          44  GA:         172                          QRS:        18  QRSD:       80                           T:          22  QT:         402  QTc:        437    Interpretive Statements  Sinus rhythm  Consider left atrial enlargement  Abnormal R-wave progression, early transition  Compared to ECG 2023 13:56:17  Ventricular premature complex(es) no longer present  Sinus pause or arrest no longer present          RADIOLOGY  I have independently interpreted the diagnostic imaging associated with this visit and am waiting the final reading from the radiologist.   My preliminary interpretation is as follows: No acute findings.    DX-CHEST-PORTABLE (1 VIEW)   Final Result         1.  No acute cardiopulmonary disease.   2.  Atherosclerosis             COURSE & MEDICAL DECISION MAKING  Nursing notes, VS, PMSFHx reviewed in chart.     Review of past medical records shows the patient was seen in the office by orthopedics on 2023 for low back pain.  Patient was last seen in this ED April 3, 2023 for feeling disoriented with headache and stroke work-up without acute findings.      Differential diagnoses considered include but are not limited to: CVA/TIA/intracranial hemorrhage, sepsis, encephalopathy, UTI, hypoglycemia, electrolyte abnormality, thyroid disorder, dementia       ED Observation Status? Yes; I am placing the patient in to an observation status due to a diagnostic uncertainty as well as therapeutic intensity. Patient placed in observation status at 9:53 PM, 2023.     Observation plan is as follows: We will obtain EKG, imaging and laboratory studies and monitor patient in  the ED.    Upon Reevaluation, the patient's condition has: Remained stable and will be discharged.    Patient discharged from ED Observation status at 0135 on August 17, 2023.      INITIAL ASSESSMENT AND PLAN  Care Narrative: This is a 87-year-old female patient with past medical history including hypertension, chronic kidney disease, thyroid disorder, arthritis brought in by daughter to the ED due to concern for dizziness and possible altered mental status and potential UTI.  Will obtain EKG, imaging and laboratory studies and closely monitor patient in the ED.      Discussion of management with other QHP or appropriate source(s): None     Escalation of care considered, and ultimately not performed: acute inpatient care management, however at this time, the patient is most appropriate for outpatient management.     Decision tools and prescription drugs considered including, but not limited to: Antibiotics   and Medication modification   .        History and physical exam as above.  UA today without overt signs of infection.  Patient with mild leukocytosis without elevation of lactic acid or procalcitonin to suggest sepsis.  TSH in normal range.  Patient with mildly elevated initial troponin without increase on delta troponin to suggest ACS and EKG without acute findings or significant change compared to prior.  Patient without significant electrolyte derangement or evidence for renal or hepatic dysfunction.  Chest x-ray without acute findings.  I discussed the findings with patient and daughter.  Patient was closely monitored in the ED and remained clinically stable.  She is alert, smiling and pleasant, in no acute distress and nontoxic in appearance.  She does not have any focal neurological findings to suggest CVA.  She was able to ambulate unassisted in the ED and I doubt that this is posterior circulation CVA as the etiology for her nonspecific dizziness.  No worrisome dysrhythmia noted in the ED.  At this  time, no clear etiology for her symptoms but also no convincing evidence for emergent pathology or indications for admission.  Patient and daughter advised on outpatient follow-up and given return to ED precautions.  Patient also noted with incidental elevated blood pressure for which she can follow-up on outpatient basis for further management.  They verbalized understanding and agreed with plan of care with no further questions or concerns.      The patient is referred to a primary physician for blood pressure management, diabetic screening, and for all other preventative health concerns.       FINAL IMPRESSION  1. Dizziness Active          DISPOSITION  Patient will be discharged home in stable condition.       FOLLOW UP  Eliane Lloyd M.D.  [address]  [place] [address]  [phone]    Call today      [facility], Emergency Dept  [address]  [place] [id number] [phone]    If symptoms worsen         OUTPATIENT MEDICATIONS  Discharge Medication List as of 8/17/2023  1:44 AM             Electronically signed by: Chase Osman D.O., 8/16/2023 9:51 PM      Portions of this record were made with voice recognition software.  Despite my review, errors may remain.  Please interpret this chart in the appropriate context.

## 2023-08-19 LAB
BACTERIA UR CULT: NORMAL
SIGNIFICANT IND 70042: NORMAL
SITE SITE: NORMAL
SOURCE SOURCE: NORMAL

## 2023-08-22 LAB
BACTERIA BLD CULT: NORMAL
BACTERIA BLD CULT: NORMAL
SIGNIFICANT IND 70042: NORMAL
SIGNIFICANT IND 70042: NORMAL
SITE SITE: NORMAL
SITE SITE: NORMAL
SOURCE SOURCE: NORMAL
SOURCE SOURCE: NORMAL

## 2023-09-18 ENCOUNTER — HOSPITAL ENCOUNTER (OUTPATIENT)
Dept: LAB | Facility: MEDICAL CENTER | Age: 88
End: 2023-09-18
Attending: FAMILY MEDICINE
Payer: MEDICARE

## 2023-09-18 ENCOUNTER — HOSPITAL ENCOUNTER (OUTPATIENT)
Dept: RADIOLOGY | Facility: MEDICAL CENTER | Age: 88
End: 2023-09-18
Attending: NURSE PRACTITIONER
Payer: MEDICARE

## 2023-09-18 DIAGNOSIS — R42 DIZZINESS AND GIDDINESS: ICD-10-CM

## 2023-09-18 DIAGNOSIS — R25.9 ABNORMAL INVOLUNTARY MOVEMENT: ICD-10-CM

## 2023-09-18 LAB
ALBUMIN SERPL BCP-MCNC: 4.7 G/DL (ref 3.2–4.9)
ALBUMIN/GLOB SERPL: 2.4 G/DL
ALP SERPL-CCNC: 62 U/L (ref 30–99)
ALT SERPL-CCNC: 8 U/L (ref 2–50)
ANION GAP SERPL CALC-SCNC: 14 MMOL/L (ref 7–16)
AST SERPL-CCNC: 15 U/L (ref 12–45)
BASOPHILS # BLD AUTO: 0.8 % (ref 0–1.8)
BASOPHILS # BLD: 0.1 K/UL (ref 0–0.12)
BILIRUB SERPL-MCNC: 1.1 MG/DL (ref 0.1–1.5)
BUN SERPL-MCNC: 17 MG/DL (ref 8–22)
CALCIUM ALBUM COR SERPL-MCNC: 8.9 MG/DL (ref 8.5–10.5)
CALCIUM SERPL-MCNC: 9.5 MG/DL (ref 8.4–10.2)
CHLORIDE SERPL-SCNC: 103 MMOL/L (ref 96–112)
CO2 SERPL-SCNC: 23 MMOL/L (ref 20–33)
CREAT SERPL-MCNC: 1.1 MG/DL (ref 0.5–1.4)
EOSINOPHIL # BLD AUTO: 0.18 K/UL (ref 0–0.51)
EOSINOPHIL NFR BLD: 1.5 % (ref 0–6.9)
ERYTHROCYTE [DISTWIDTH] IN BLOOD BY AUTOMATED COUNT: 47.1 FL (ref 35.9–50)
FASTING STATUS PATIENT QL REPORTED: NORMAL
GFR SERPLBLD CREATININE-BSD FMLA CKD-EPI: 49 ML/MIN/1.73 M 2
GLOBULIN SER CALC-MCNC: 2 G/DL (ref 1.9–3.5)
GLUCOSE SERPL-MCNC: 99 MG/DL (ref 65–99)
HCT VFR BLD AUTO: 60.8 % (ref 37–47)
HGB BLD-MCNC: 19.5 G/DL (ref 12–16)
IMM GRANULOCYTES # BLD AUTO: 0.3 K/UL (ref 0–0.11)
IMM GRANULOCYTES NFR BLD AUTO: 2.5 % (ref 0–0.9)
LYMPHOCYTES # BLD AUTO: 1.28 K/UL (ref 1–4.8)
LYMPHOCYTES NFR BLD: 10.7 % (ref 22–41)
MCH RBC QN AUTO: 24.7 PG (ref 27–33)
MCHC RBC AUTO-ENTMCNC: 32.1 G/DL (ref 32.2–35.5)
MCV RBC AUTO: 76.9 FL (ref 81.4–97.8)
MONOCYTES # BLD AUTO: 1.77 K/UL (ref 0–0.85)
MONOCYTES NFR BLD AUTO: 14.7 % (ref 0–13.4)
NEUTROPHILS # BLD AUTO: 8.38 K/UL (ref 1.82–7.42)
NEUTROPHILS NFR BLD: 69.8 % (ref 44–72)
NRBC # BLD AUTO: 0 K/UL
NRBC BLD-RTO: 0 /100 WBC (ref 0–0.2)
PLATELET # BLD AUTO: 263 K/UL (ref 164–446)
PMV BLD AUTO: 10.1 FL (ref 9–12.9)
POTASSIUM SERPL-SCNC: 4.4 MMOL/L (ref 3.6–5.5)
PROT SERPL-MCNC: 6.7 G/DL (ref 6–8.2)
RBC # BLD AUTO: 7.91 M/UL (ref 4.2–5.4)
SODIUM SERPL-SCNC: 140 MMOL/L (ref 135–145)
T4 SERPL-MCNC: 7.4 UG/DL (ref 4–12)
TSH SERPL DL<=0.005 MIU/L-ACNC: 1.41 UIU/ML (ref 0.38–5.33)
WBC # BLD AUTO: 12 K/UL (ref 4.8–10.8)

## 2023-09-18 PROCEDURE — 80053 COMPREHEN METABOLIC PANEL: CPT

## 2023-09-18 PROCEDURE — 70551 MRI BRAIN STEM W/O DYE: CPT

## 2023-09-18 PROCEDURE — 36415 COLL VENOUS BLD VENIPUNCTURE: CPT

## 2023-09-18 PROCEDURE — 85025 COMPLETE CBC W/AUTO DIFF WBC: CPT

## 2023-09-18 PROCEDURE — 84443 ASSAY THYROID STIM HORMONE: CPT

## 2023-09-18 PROCEDURE — 84436 ASSAY OF TOTAL THYROXINE: CPT

## 2023-09-18 PROCEDURE — 87086 URINE CULTURE/COLONY COUNT: CPT | Mod: GA

## 2023-09-19 ENCOUNTER — HOSPITAL ENCOUNTER (OUTPATIENT)
Dept: RADIOLOGY | Facility: MEDICAL CENTER | Age: 88
End: 2023-09-19
Attending: NURSE PRACTITIONER
Payer: MEDICARE

## 2023-09-19 DIAGNOSIS — R25.9 ABNORMAL INVOLUNTARY MOVEMENT: ICD-10-CM

## 2023-09-19 DIAGNOSIS — R42 DIZZINESS AND GIDDINESS: ICD-10-CM

## 2023-09-19 PROCEDURE — 93880 EXTRACRANIAL BILAT STUDY: CPT | Mod: 26 | Performed by: INTERNAL MEDICINE

## 2023-09-19 PROCEDURE — 93880 EXTRACRANIAL BILAT STUDY: CPT

## 2023-09-20 LAB
BACTERIA UR CULT: NORMAL
SIGNIFICANT IND 70042: NORMAL
SITE SITE: NORMAL
SOURCE SOURCE: NORMAL

## 2023-09-26 ENCOUNTER — HOSPITAL ENCOUNTER (OUTPATIENT)
Dept: CARDIOLOGY | Facility: MEDICAL CENTER | Age: 88
End: 2023-09-26
Attending: NURSE PRACTITIONER
Payer: MEDICARE

## 2023-09-26 DIAGNOSIS — I63.40 CEREBRAL INFARCTION DUE TO EMBOLISM OF CEREBRAL ARTERY (HCC): ICD-10-CM

## 2023-09-26 PROCEDURE — 93306 TTE W/DOPPLER COMPLETE: CPT

## 2023-09-27 LAB
LV EJECT FRACT  99904: 65
LV EJECT FRACT MOD 2C 99903: 66.56
LV EJECT FRACT MOD 4C 99902: 64.97
LV EJECT FRACT MOD BP 99901: 65.51

## 2023-09-27 PROCEDURE — 93306 TTE W/DOPPLER COMPLETE: CPT | Mod: 26 | Performed by: INTERNAL MEDICINE

## 2023-11-29 ENCOUNTER — PATIENT MESSAGE (OUTPATIENT)
Dept: HEALTH INFORMATION MANAGEMENT | Facility: OTHER | Age: 88
End: 2023-11-29

## 2023-12-06 ENCOUNTER — OFFICE VISIT (OUTPATIENT)
Dept: URGENT CARE | Facility: CLINIC | Age: 88
End: 2023-12-06
Payer: MEDICARE

## 2023-12-06 ENCOUNTER — APPOINTMENT (OUTPATIENT)
Dept: RADIOLOGY | Facility: IMAGING CENTER | Age: 88
End: 2023-12-06
Attending: FAMILY MEDICINE
Payer: MEDICARE

## 2023-12-06 VITALS
SYSTOLIC BLOOD PRESSURE: 116 MMHG | BODY MASS INDEX: 20.98 KG/M2 | RESPIRATION RATE: 14 BRPM | DIASTOLIC BLOOD PRESSURE: 66 MMHG | HEIGHT: 62 IN | HEART RATE: 81 BPM | TEMPERATURE: 98 F | OXYGEN SATURATION: 96 % | WEIGHT: 114 LBS

## 2023-12-06 DIAGNOSIS — N18.9 CHRONIC KIDNEY DISEASE, UNSPECIFIED CKD STAGE: ICD-10-CM

## 2023-12-06 DIAGNOSIS — S29.9XXA TRAUMATIC INJURY OF RIB: ICD-10-CM

## 2023-12-06 DIAGNOSIS — I10 PRIMARY HYPERTENSION: ICD-10-CM
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
## 2023-12-06 PROCEDURE — 3078F DIAST BP <80 MM HG: CPT | Performed by: FAMILY MEDICINE

## 2023-12-06 PROCEDURE — 3074F SYST BP LT 130 MM HG: CPT | Performed by: FAMILY MEDICINE

## 2023-12-06 PROCEDURE — 71101 X-RAY EXAM UNILAT RIBS/CHEST: CPT | Mod: TC,FY,LT | Performed by: FAMILY MEDICINE

## 2023-12-06 PROCEDURE — 99214 OFFICE O/P EST MOD 30 MIN: CPT | Performed by: FAMILY MEDICINE

## 2023-12-06 RX ORDER — LIDOCAINE 4 G/G
PATCH TOPICAL
Qty: 14 PATCH | Refills: 0 | Status: SHIPPED | OUTPATIENT
Start: 2023-12-06

## 2023-12-06 ASSESSMENT — ENCOUNTER SYMPTOMS: FALLS: 1

## 2023-12-06 ASSESSMENT — FIBROSIS 4 INDEX: FIB4 SCORE: 1.75

## 2023-12-07 NOTE — PROGRESS NOTES
Subjective     Michelle Mckeon is a 88 y.o. female who presents with Fall (Patient states did fall today and hurt L side rib)      - This is a very pleasant 88 y.o. who has come to the walk-in clinic today for tripped over her walker and fell and landed mainly on Lt lower side ribs. Pain since, worse w/ deep breathing or laughing, better rest. No bloody sputum or sob. No head trauma/loc or pain/injury reported elsewhere at this time       Hx CKD, GFR  49 9/23    Hx HTN, stable naomi current meds      ALLERGIES:  Sulfa drugs     PMH:  Past Medical History:   Diagnosis Date    Arthritis     Chronic kidney disease     Disorder of thyroid     Fall     Hypertension         PSH:  Past Surgical History:   Procedure Laterality Date    LUMBAR FUSION O-ARM  10/31/2022    Procedure: FUSION, SPINE, LIMBAR, TLIF, WITH O-ARM GUIDANCE L3-5 ;  Surgeon: Tammy Rowland M.D.;  Location: [facility];  Service: Neurosurgery    LUMBAR LAMINECTOMY DISKECTOMY  10/31/2022    Procedure: LAMINECTOMY, SPINE, LUMBAR, L3-5;  Surgeon: Tammy Rowland M.D.;  Location: SURGERY [facility];  Service: Neurosurgery    FORAMINOTOMY Bilateral 10/31/2022    Procedure: FORAMINOTOMY, SPINE;  Surgeon: Tammy Rowland M.D.;  Location: SURGERY [facility];  Service: Neurosurgery    LUMBAR DECOMPRESSION  10/31/2022    Procedure: DECOMPRESSION, SPINE, LUMBAR L3-4, 4-5;  Surgeon: Tammy Rowland M.D.;  Location: SURGERY [facility];  Service: Neurosurgery    OTHER ORTHOPEDIC SURGERY      Blateral knee replacements       MEDS:    Current Outpatient Medications:     lidocaine (LIDOCAINE PAIN RELIEF) 4 % Patch, OTC as directed, Disp: 14 Patch, Rfl: 0    acetaminophen (TYLENOL) 500 MG Tab, Take 1,000 mg by mouth every 6 hours as needed for Moderate Pain., Disp: , Rfl:     POTASSIUM PO, Take 1 Tablet by mouth every day., Disp: , Rfl:     lisinopril (PRINIVIL) 2.5 MG Tab, Take 2.5 mg by mouth every day., Disp: , Rfl:     MAGNESIUM PO, Take 1 Capsule by  "mouth every day., Disp: , Rfl:     VITAMIN D, CHOLECALCIFEROL, PO, Take  by mouth every day., Disp: , Rfl:     alendronate (FOSAMAX) 70 MG Tab, Take 70 mg by mouth every 7 days. On Wed, Disp: , Rfl:     levothyroxine (SYNTHROID) 75 MCG Tab, Take 1 Tab by mouth Every morning on an empty stomach. Take one tablet 5 days a week; T,W,F,S,S   Indications: Underactive Thyroid, Disp: 30 Tab, Rfl: 11    levothyroxine (SYNTHROID) 50 MCG Tab, Take 1 Tab by mouth Every morning on an empty stomach. Take 1 tab on Monday and Thursday  Indications: Underactive Thyroid, Disp: 30 Tab, Rfl: 11    Cholecalciferol (D3 PO), Take 1 Capsule by mouth every day., Disp: , Rfl:     ** I have documented what I find to be significant in regards to past medical, social, family and surgical history  in my HPI or under PMH/PSH/FH review section, otherwise it is noncontributory **         HPI    Review of Systems   Cardiovascular:  Chest pain: ribs.   Musculoskeletal:  Positive for falls.   All other systems reviewed and are negative.             Objective     /66 (BP Location: Left arm, Patient Position: Sitting, BP Cuff Size: Large adult)   Pulse 81   Temp 36.7 °C (98 °F)   Resp 14   Ht 1.575 m (5' 2\")   Wt 51.7 kg (114 lb)   SpO2 96%   BMI 20.85 kg/m²      Physical Exam  Vitals and nursing note reviewed.   Constitutional:       General: She is not in acute distress.     Appearance: Normal appearance. She is well-developed.   HENT:      Head: Normocephalic.   Cardiovascular:      Heart sounds: Normal heart sounds. No murmur heard.  Pulmonary:      Effort: Pulmonary effort is normal. No respiratory distress.      Breath sounds: Normal breath sounds.   Chest:      Chest wall: Tenderness present.          Comments: No wounds, edema, crepitus discoloration. + ttp   Neurological:      Mental Status: She is alert.      Motor: No abnormal muscle tone.   Psychiatric:         Mood and Affect: Mood normal.         Behavior: Behavior normal. "                             Assessment & Plan     1. Ground-level fall  [id number] (WITH 1-VIEW CXR) LEFT      2. Traumatic injury of rib  [id number] (WITH 1-VIEW CXR) LEFT    lidocaine (LIDOCAINE PAIN RELIEF) 4 % Patch      3. Primary hypertension        4. Chronic kidney disease, unspecified CKD stage            - Dx, plan & d/c instructions discussed   - Rest, stay hydrated  -  OTC Tylenol as needed      Follow up with your regular primary care providers office within a week to keep them updated and informed of this visit and for regular routine health maintenance check-ups. ER if not improving in 2-3 days or if feeling/getting worse.     Patient left in stable condition     Today's radiology imaging personally reviewed by me today on day of visit and Radiology readings reviewed and discussed w/ patient today.       Pertinent prior lab work and/or imaging studies in Epic have been reviewed by me today on day of this visit and taken into account for my treatment and plan today    Pertinent PMH/PSH and/or chronic conditions and medications if any were reviewed today and taken into account for my treatment and plan today    Pertinent prior office visit notes in [facility] have been reviewed by me today on day of this visit.    Please note that this dictation may have been created using voice recognition software, if so I have made every reasonable attempt to correct obvious errors, but I expect that there are errors of grammar and possibly content that I did not discover before finalizing the note.

## 2024-01-02 ENCOUNTER — HOSPITAL ENCOUNTER (EMERGENCY)
Facility: MEDICAL CENTER | Age: 89
End: 2024-01-02
Attending: EMERGENCY MEDICINE
Payer: COMMERCIAL

## 2024-01-02 VITALS
SYSTOLIC BLOOD PRESSURE: 135 MMHG | BODY MASS INDEX: 20.97 KG/M2 | HEIGHT: 62 IN | TEMPERATURE: 98 F | HEART RATE: 83 BPM | OXYGEN SATURATION: 91 % | RESPIRATION RATE: 20 BRPM | DIASTOLIC BLOOD PRESSURE: 71 MMHG | WEIGHT: 113.98 LBS

## 2024-01-02 DIAGNOSIS — M54.32 SCIATICA OF LEFT SIDE: ICD-10-CM

## 2024-01-02 PROCEDURE — 96375 TX/PRO/DX INJ NEW DRUG ADDON: CPT

## 2024-01-02 PROCEDURE — 700101 HCHG RX REV CODE 250: Performed by: EMERGENCY MEDICINE

## 2024-01-02 PROCEDURE — 700102 HCHG RX REV CODE 250 W/ 637 OVERRIDE(OP): Performed by: EMERGENCY MEDICINE

## 2024-01-02 PROCEDURE — 96374 THER/PROPH/DIAG INJ IV PUSH: CPT

## 2024-01-02 PROCEDURE — A9270 NON-COVERED ITEM OR SERVICE: HCPCS | Performed by: EMERGENCY MEDICINE

## 2024-01-02 PROCEDURE — 99285 EMERGENCY DEPT VISIT HI MDM: CPT

## 2024-01-02 PROCEDURE — 700111 HCHG RX REV CODE 636 W/ 250 OVERRIDE (IP): Performed by: EMERGENCY MEDICINE

## 2024-01-02 RX ORDER — GABAPENTIN 100 MG/1
100 CAPSULE ORAL 3 TIMES DAILY
Qty: 90 CAPSULE | Refills: 0 | Status: SHIPPED | OUTPATIENT
Start: 2024-01-02

## 2024-01-02 RX ORDER — ACETAMINOPHEN 325 MG/1
650 TABLET ORAL ONCE
Status: DISCONTINUED | OUTPATIENT
Start: 2024-01-02 | End: 2024-01-02 | Stop reason: HOSPADM

## 2024-01-02 RX ORDER — GABAPENTIN 100 MG/1
100 CAPSULE ORAL ONCE
Status: COMPLETED | OUTPATIENT
Start: 2024-01-02 | End: 2024-01-02

## 2024-01-02 RX ORDER — KETOROLAC TROMETHAMINE 30 MG/ML
15 INJECTION, SOLUTION INTRAMUSCULAR; INTRAVENOUS ONCE
Status: COMPLETED | OUTPATIENT
Start: 2024-01-02 | End: 2024-01-02

## 2024-01-02 RX ORDER — METHYLPREDNISOLONE 4 MG/1
TABLET ORAL
Qty: 1 EACH | Refills: 0 | Status: SHIPPED | OUTPATIENT
Start: 2024-01-02 | End: 2024-01-31

## 2024-01-02 RX ORDER — LIDOCAINE 50 MG/G
1 PATCH TOPICAL ONCE
Status: DISCONTINUED | OUTPATIENT
Start: 2024-01-02 | End: 2024-01-02 | Stop reason: HOSPADM

## 2024-01-02 RX ORDER — MIDAZOLAM HYDROCHLORIDE 1 MG/ML
0.5 INJECTION INTRAMUSCULAR; INTRAVENOUS ONCE
Status: COMPLETED | OUTPATIENT
Start: 2024-01-02 | End: 2024-01-02

## 2024-01-02 RX ORDER — DEXAMETHASONE SODIUM PHOSPHATE 4 MG/ML
4 INJECTION, SOLUTION INTRA-ARTICULAR; INTRALESIONAL; INTRAMUSCULAR; INTRAVENOUS; SOFT TISSUE ONCE
Status: COMPLETED | OUTPATIENT
Start: 2024-01-02 | End: 2024-01-02

## 2024-01-02 RX ADMIN — LIDOCAINE 1 PATCH: 50 PATCH TOPICAL at 08:19

## 2024-01-02 RX ADMIN — GABAPENTIN 100 MG: 100 CAPSULE ORAL at 06:47

## 2024-01-02 RX ADMIN — MIDAZOLAM HYDROCHLORIDE 0.5 MG: 1 INJECTION, SOLUTION INTRAMUSCULAR; INTRAVENOUS at 06:47

## 2024-01-02 RX ADMIN — KETAMINE HYDROCHLORIDE 25 MG: 10 INJECTION INTRAMUSCULAR; INTRAVENOUS at 06:54

## 2024-01-02 RX ADMIN — KETOROLAC TROMETHAMINE 15 MG: 30 INJECTION, SOLUTION INTRAMUSCULAR; INTRAVENOUS at 06:47

## 2024-01-02 RX ADMIN — DEXAMETHASONE SODIUM PHOSPHATE 4 MG: 4 INJECTION INTRA-ARTICULAR; INTRALESIONAL; INTRAMUSCULAR; INTRAVENOUS; SOFT TISSUE at 06:46

## 2024-01-02 ASSESSMENT — LIFESTYLE VARIABLES
ON A TYPICAL DAY WHEN YOU DRINK ALCOHOL HOW MANY DRINKS DO YOU HAVE: 0
AVERAGE NUMBER OF DAYS PER WEEK YOU HAVE A DRINK CONTAINING ALCOHOL: 0
CONSUMPTION TOTAL: NEGATIVE
TOTAL SCORE: 0
TOTAL SCORE: 0
EVER FELT BAD OR GUILTY ABOUT YOUR DRINKING: NO
DO YOU DRINK ALCOHOL: NO
HAVE YOU EVER FELT YOU SHOULD CUT DOWN ON YOUR DRINKING: NO
HAVE PEOPLE ANNOYED YOU BY CRITICIZING YOUR DRINKING: NO
HOW MANY TIMES IN THE PAST YEAR HAVE YOU HAD 5 OR MORE DRINKS IN A DAY: 0
TOTAL SCORE: 0
EVER HAD A DRINK FIRST THING IN THE MORNING TO STEADY YOUR NERVES TO GET RID OF A HANGOVER: NO

## 2024-01-02 ASSESSMENT — FIBROSIS 4 INDEX: FIB4 SCORE: 1.75

## 2024-01-02 NOTE — DISCHARGE PLANNING
ER CM met with pt. TROY Mullins. She was up in [employer]. Address reviewed. Correct. She lives in a 1 story home with ramps. She has a FWW at home She has had Home health in Remote past. She lives with Daughters. She has hx of TLIF with Dr Rowland and in last visit he discussed PT options with her but does not appear that she started that. She has had knee pain MD correlated that this is related to sciatic pain. She  has recently seen PCP per her report Dr Jackson and is due to see him as well.  She has seen her Nuero Meghan Pennington.  She has chronic LBP and neuro issues. ER CM discussed with her Home health and she is agreeable. Choice form done 1) [facility] 2) [facility] 3) [facility]. Sent to [facility] . Daughter in route to take her home. Rx Lidoderm patch 5 percent ( OTC is 4 percent if denied). She is given Medrol dose pack as well and neurontin. Home health can do PT OT and RN RX management for pain and mobility. Care Transition Team Assessment    Information Source  Orientation Level: Oriented X4  Information Given By: Patient  Informant's Name: dino  Who is responsible for making decisions for patient? : Patient         Elopement Risk  Legal Hold: No  Ambulatory or Self Mobile in Wheelchair: No-Not an Elopement Risk    Interdisciplinary Discharge Planning  Primary Care Physician: Eliane  Lives with - Patient's Self Care Capacity: Adult Children  Support Systems: Children  Housing / Facility: 1 Story House  Do You Take your Prescribed Medications Regularly: Yes  Able to Return to Previous ADL's: Future Time w/Therapy  Mobility Issues: Yes  Assistance Needed: Yes  Durable Medical Equipment: Walker    Discharge Preparedness  What is your plan after discharge?: Home health care  What are your discharge supports?: Child  Prior Functional Level: Ambulatory, Uses Walker    Functional Assesment  Prior Functional Level: Ambulatory, Uses Walker    Finances  Prescription Coverage: Yes                   Domestic Abuse  Have  you ever been the victim of abuse or violence?: No    Psychological Assessment  History of Substance Abuse: None         Anticipated Discharge Information  Discharge Disposition: D/T to home under A care in anticipation of covered skilled care (06)

## 2024-01-02 NOTE — ED NOTES
Patient is stable for discharge at this time, anticipatory guidance provided, close follow-up is encouraged, and ED return instructions have been detailed. Patient is both agreeable to the disposition and plan and discharged home in ambulatory state using walker and in good condition.     Rx education provided, Pt verbalized understanding;     Pt reminded to use walker (personal) at home if needed - Pt verbalized understanding;

## 2024-01-02 NOTE — ED TRIAGE NOTES
"Chief Complaint   Patient presents with    Weakness     Pt fell 3 weeks ago and was seen here for rib pain. Since then pt has had increase lower back and knee pain on her left side. Pt states it is only when she moves her leg it hurts      /58   Pulse 85   Temp 36.7 °C (98.1 °F) (Temporal)   Resp 16   Ht 1.575 m (5' 2\")   Wt 51.7 kg (113 lb 15.7 oz)   SpO2 92%   BMI 20.85 kg/m²     "

## 2024-01-02 NOTE — DISCHARGE PLANNING
Anticipated Discharge Disposition: Home    Action: Updated pt via Dtr Brucy that Home health accepted [facility]. Reviewed plan and concerns. Dtr very supportive    Barriers to Discharge: None    Plan: No further needs ER CM

## 2024-01-02 NOTE — ED NOTES
Pt ambulated to/from restroom using walker, Pt appeared do have some difficulty getting out of the gurney, however was able to ambulate, witnessed by SUSAN Vinson, ERP notified;    Pt stated that she has a walker at home from her back Sx, ERP notified;

## 2024-01-02 NOTE — ED PROVIDER NOTES
ED Provider Note    CHIEF COMPLAINT  Chief Complaint   Patient presents with    Weakness     Pt fell 3 weeks ago and was seen here for rib pain. Since then pt has had increase lower back and knee pain on her left side. Pt states it is only when she moves her leg it hurts        EXTERNAL RECORDS REVIEWED  Outpatient Notes reviewed physician assistant office visit note from Dr. Munoz where she is status post L3-L5 TLIF on     HPI/ROS    Michelle Mckeon is a 88 y.o. female who presents with pain at her left knee.  She says that when she is not bending it does not hurt but when she starts to bend her left knee she has sharp stabbing pain that goes up into her back.  Denies any bowel or bladder incontinence and has had some previous surgery to her back in 2022 where she had laminectomy and discectomy as well as fusion after decompression.    PAST MEDICAL HISTORY   has a past medical history of Arthritis, Chronic kidney disease, Disorder of thyroid, Fall, and Hypertension.    SURGICAL HISTORY   has a past surgical history that includes other orthopedic surgery; lumbar fusion o-arm (10/31/2022); lumbar laminectomy diskectomy (10/31/2022); foraminotomy (Bilateral, 10/31/2022); and lumbar decompression (10/31/2022).    FAMILY HISTORY  No family history on file.    SOCIAL HISTORY  Social History     Tobacco Use    Smoking status: Former     Current packs/day: 0.00     Types: Cigarettes     Quit date: 1965     Years since quittin.0    Smokeless tobacco: Never   Vaping Use    Vaping Use: Never used   Substance and Sexual Activity    Alcohol use: Not Currently     Alcohol/week: 4.2 oz     Types: 7 Glasses of wine per week    Drug use: Never    Sexual activity: Not on file       CURRENT MEDICATIONS  Home Medications       Reviewed by Tayo Gomes R.N. (Registered Nurse) on 24 at 0693  Med List Status: Not Addressed     Medication Last Dose Status   acetaminophen (TYLENOL) 500 MG Tab   "Active   alendronate (FOSAMAX) 70 MG Tab  Active   Cholecalciferol (D3 PO)  Active   levothyroxine (SYNTHROID) 50 MCG Tab  Active   levothyroxine (SYNTHROID) 75 MCG Tab  Active   lidocaine (LIDOCAINE PAIN RELIEF) 4 % Patch  Active   lisinopril (PRINIVIL) 2.5 MG Tab  Active   MAGNESIUM PO  Active   POTASSIUM PO  Active   VITAMIN D, CHOLECALCIFEROL, PO  Active                    ALLERGIES  Allergies   Allergen Reactions    Sulfa Drugs Itching       PHYSICAL EXAM  VITAL SIGNS: /71   Pulse 83   Temp 36.7 °C (98 °F) (Temporal)   Resp 20   Ht 1.575 m (5' 2\")   Wt 51.7 kg (113 lb 15.7 oz)   SpO2 91%   BMI 20.85 kg/m²    Constitutional: Well developed, Well nourished, No acute distress, Non-toxic appearance.   HENT: Normocephalic, Atraumatic, Bilateral external ears normal, Oropharynx is clear mucous membranes are moist. No oral exudates or nasal discharge.   Eyes: Pupils are equal round and reactive, EOMI, Conjunctiva normal, No discharge.   Neck: Normal range of motion, No tenderness, Supple, No stridor. No meningismus.  Lymphatic: No lymphadenopathy noted.   Cardiovascular: Regular rate and rhythm without murmur rub or gallop.  Thorax & Lungs: Clear breath sounds bilaterally without wheezes, rhonchi or rales. There is no chest wall tenderness.   Abdomen: Soft non-tender non-distended. There is no rebound or guarding. No organomegaly is appreciated. Bowel sounds are normal.  Skin: Normal without rash.   Back: Significant left lower paralumbar tenderness.   Extremities: Intact distal pulses, No edema, No tenderness about the left knee, No cyanosis, No clubbing. Capillary refill is less than 2 seconds.  Musculoskeletal: Good range of motion in all major joints. No tenderness to palpation or major deformities noted.   Neurologic: Alert & oriented x 3, Normal motor function, Normal sensory function, No focal deficits noted. Reflexes are normal.  Positive straight leg raise left leg at 30 degrees.  Psychiatric: " Affect normal, Judgment normal, Mood normal. There is no suicidal ideation or patient reported hallucinations.       DIAGNOSTIC STUDIES / PROCEDURES      COURSE & MEDICAL DECISION MAKING    ED Observation Status? No; Patient does not meet criteria for ED Observation.     INITIAL ASSESSMENT, COURSE AND PLAN  Care Narrative: Patient presents with signs and symptoms and examination most consistent with left-sided sciatica, acute.  I do not think he has cauda equina syndrome or spinal epidural abscess clinically.    Imaging is not indicated and the patient was aggressively medicated with ketamine, Versed, Toradol, Neurontin, Decadron    After a couple of hours the patient did show some improvement.  Ultimately she ambulated to the bathroom with a walker and she does have a walker at home.    Case management was consulted and we feel that she would benefit from assessment at home by home health and potentially may need additional aid down the road.  Daughter is aware and is coming to pick her up    DISPOSITION AND DISCUSSIONS  Discussed her case with case management and we have referred her for home health to aid in her PT and OT evaluation and gait and fall risk at home    Escalation of care considered, and ultimately not performed:acute inpatient care management, however at this time, the patient is most appropriate for outpatient management    Decision tools and prescription drugs considered including, but not limited to: Pain Medications will use not on opiate therapy including Neurontin and Flexeril as well as Medrol Dosepak .    FINAL DIAGNOSIS  1. Sciatica of left side           Electronically signed by: Thad Mckeon M.D., 1/2/2024 10:43 AM

## 2024-01-02 NOTE — ED NOTES
Lidocaine patch put on Lt knee per Pt request, Pt stated that she is not having any Pn right now because she isn't moving around, no distress noted;

## 2024-01-02 NOTE — DISCHARGE INSTRUCTIONS
Please start Medrol Dosepak tomorrow  Start Neurontin today  I believe you have inflammation of your left sciatic nerve producing your knee pain and this will take some time to calm down  See your doctor later this week and follow-up

## 2024-01-18 ENCOUNTER — HOSPITAL ENCOUNTER (OUTPATIENT)
Dept: LAB | Facility: MEDICAL CENTER | Age: 89
End: 2024-01-18
Attending: FAMILY MEDICINE
Payer: COMMERCIAL

## 2024-01-18 LAB
25(OH)D3 SERPL-MCNC: 120 NG/ML (ref 30–100)
ALBUMIN SERPL BCP-MCNC: 4.3 G/DL (ref 3.2–4.9)
ALBUMIN/GLOB SERPL: 2.9 G/DL
ALP SERPL-CCNC: 71 U/L (ref 30–99)
ALT SERPL-CCNC: 22 U/L (ref 2–50)
ANION GAP SERPL CALC-SCNC: 11 MMOL/L (ref 7–16)
ANISOCYTOSIS BLD QL SMEAR: ABNORMAL
AST SERPL-CCNC: 25 U/L (ref 12–45)
BASOPHILS # BLD AUTO: 0 % (ref 0–1.8)
BASOPHILS # BLD: 0 K/UL (ref 0–0.12)
BILIRUB SERPL-MCNC: 0.7 MG/DL (ref 0.1–1.5)
BUN SERPL-MCNC: 20 MG/DL (ref 8–22)
BURR CELLS BLD QL SMEAR: NORMAL
CALCIUM ALBUM COR SERPL-MCNC: 9 MG/DL (ref 8.5–10.5)
CALCIUM SERPL-MCNC: 9.2 MG/DL (ref 8.5–10.5)
CHLORIDE SERPL-SCNC: 106 MMOL/L (ref 96–112)
CHOLEST SERPL-MCNC: 135 MG/DL (ref 100–199)
CO2 SERPL-SCNC: 23 MMOL/L (ref 20–33)
CREAT SERPL-MCNC: 0.97 MG/DL (ref 0.5–1.4)
EOSINOPHIL # BLD AUTO: 0.34 K/UL (ref 0–0.51)
EOSINOPHIL NFR BLD: 1.7 % (ref 0–6.9)
ERYTHROCYTE [DISTWIDTH] IN BLOOD BY AUTOMATED COUNT: 76.5 FL (ref 35.9–50)
EST. AVERAGE GLUCOSE BLD GHB EST-MCNC: 120 MG/DL
FASTING STATUS PATIENT QL REPORTED: NORMAL
GFR SERPLBLD CREATININE-BSD FMLA CKD-EPI: 56 ML/MIN/1.73 M 2
GLOBULIN SER CALC-MCNC: 1.5 G/DL (ref 1.9–3.5)
GLUCOSE SERPL-MCNC: 93 MG/DL (ref 65–99)
HBA1C MFR BLD: 5.8 % (ref 4–5.6)
HCT VFR BLD AUTO: 38.8 % (ref 37–47)
HDLC SERPL-MCNC: 87 MG/DL
HGB BLD-MCNC: 12.6 G/DL (ref 12–16)
LDLC SERPL CALC-MCNC: 39 MG/DL
LYMPHOCYTES # BLD AUTO: 1.88 K/UL (ref 1–4.8)
LYMPHOCYTES NFR BLD: 9.5 % (ref 22–41)
MANUAL DIFF BLD: NORMAL
MCH RBC QN AUTO: 27.5 PG (ref 27–33)
MCHC RBC AUTO-ENTMCNC: 32.5 G/DL (ref 32.2–35.5)
MCV RBC AUTO: 84.7 FL (ref 81.4–97.8)
MICROCYTES BLD QL SMEAR: ABNORMAL
MONOCYTES # BLD AUTO: 1.7 K/UL (ref 0–0.85)
MONOCYTES NFR BLD AUTO: 8.6 % (ref 0–13.4)
MORPHOLOGY BLD-IMP: NORMAL
MYELOCYTES NFR BLD MANUAL: 0.9 %
NEUTROPHILS # BLD AUTO: 15.7 K/UL (ref 1.82–7.42)
NEUTROPHILS NFR BLD: 79.3 % (ref 44–72)
NRBC # BLD AUTO: 0 K/UL
NRBC BLD-RTO: 0 /100 WBC (ref 0–0.2)
OVALOCYTES BLD QL SMEAR: NORMAL
PLATELET # BLD AUTO: 526 K/UL (ref 164–446)
PLATELET BLD QL SMEAR: NORMAL
PMV BLD AUTO: 11 FL (ref 9–12.9)
POIKILOCYTOSIS BLD QL SMEAR: NORMAL
POTASSIUM SERPL-SCNC: 5.5 MMOL/L (ref 3.6–5.5)
PROT SERPL-MCNC: 5.8 G/DL (ref 6–8.2)
RBC # BLD AUTO: 4.58 M/UL (ref 4.2–5.4)
RBC BLD AUTO: PRESENT
SODIUM SERPL-SCNC: 140 MMOL/L (ref 135–145)
T4 FREE SERPL-MCNC: 1.51 NG/DL (ref 0.93–1.7)
T4 SERPL-MCNC: 8.4 UG/DL (ref 4–12)
TRIGL SERPL-MCNC: 47 MG/DL (ref 0–149)
TSH SERPL DL<=0.005 MIU/L-ACNC: 1.18 UIU/ML (ref 0.38–5.33)
WBC # BLD AUTO: 19.8 K/UL (ref 4.8–10.8)

## 2024-01-18 PROCEDURE — 85027 COMPLETE CBC AUTOMATED: CPT

## 2024-01-18 PROCEDURE — 83036 HEMOGLOBIN GLYCOSYLATED A1C: CPT | Mod: GA

## 2024-01-18 PROCEDURE — 80053 COMPREHEN METABOLIC PANEL: CPT

## 2024-01-18 PROCEDURE — 84439 ASSAY OF FREE THYROXINE: CPT

## 2024-01-18 PROCEDURE — 36415 COLL VENOUS BLD VENIPUNCTURE: CPT

## 2024-01-18 PROCEDURE — 80061 LIPID PANEL: CPT

## 2024-01-18 PROCEDURE — 84443 ASSAY THYROID STIM HORMONE: CPT

## 2024-01-18 PROCEDURE — 85007 BL SMEAR W/DIFF WBC COUNT: CPT

## 2024-01-18 PROCEDURE — 82306 VITAMIN D 25 HYDROXY: CPT

## 2024-01-31 ENCOUNTER — HOSPITAL ENCOUNTER (OUTPATIENT)
Facility: MEDICAL CENTER | Age: 89
End: 2024-01-31
Attending: FAMILY MEDICINE
Payer: COMMERCIAL

## 2024-01-31 ENCOUNTER — OFFICE VISIT (OUTPATIENT)
Dept: URGENT CARE | Facility: CLINIC | Age: 89
End: 2024-01-31
Payer: COMMERCIAL

## 2024-01-31 VITALS
HEIGHT: 62 IN | TEMPERATURE: 98.1 F | DIASTOLIC BLOOD PRESSURE: 60 MMHG | WEIGHT: 115 LBS | SYSTOLIC BLOOD PRESSURE: 118 MMHG | HEART RATE: 78 BPM | OXYGEN SATURATION: 97 % | BODY MASS INDEX: 21.16 KG/M2

## 2024-01-31 DIAGNOSIS — I10 PRIMARY HYPERTENSION: ICD-10-CM

## 2024-01-31 DIAGNOSIS — R53.83 MALAISE AND FATIGUE: ICD-10-CM

## 2024-01-31 DIAGNOSIS — R35.0 URINARY FREQUENCY: ICD-10-CM

## 2024-01-31 DIAGNOSIS — N18.31 STAGE 3A CHRONIC KIDNEY DISEASE: ICD-10-CM

## 2024-01-31 DIAGNOSIS — R53.81 MALAISE AND FATIGUE: ICD-10-CM

## 2024-01-31 LAB
APPEARANCE UR: NORMAL
BILIRUB UR STRIP-MCNC: NORMAL MG/DL
COLOR UR AUTO: NORMAL
GLUCOSE UR STRIP.AUTO-MCNC: NORMAL MG/DL
KETONES UR STRIP.AUTO-MCNC: NORMAL MG/DL
LEUKOCYTE ESTERASE UR QL STRIP.AUTO: NORMAL
NITRITE UR QL STRIP.AUTO: NORMAL
PH UR STRIP.AUTO: 5.5 [PH] (ref 5–8)
PROT UR QL STRIP: NORMAL MG/DL
RBC UR QL AUTO: NORMAL
SP GR UR STRIP.AUTO: 1.02
UROBILINOGEN UR STRIP-MCNC: 0.2 MG/DL

## 2024-01-31 PROCEDURE — 81002 URINALYSIS NONAUTO W/O SCOPE: CPT | Performed by: FAMILY MEDICINE

## 2024-01-31 PROCEDURE — 3078F DIAST BP <80 MM HG: CPT | Performed by: FAMILY MEDICINE

## 2024-01-31 PROCEDURE — 3074F SYST BP LT 130 MM HG: CPT | Performed by: FAMILY MEDICINE

## 2024-01-31 PROCEDURE — 87086 URINE CULTURE/COLONY COUNT: CPT

## 2024-01-31 PROCEDURE — 99214 OFFICE O/P EST MOD 30 MIN: CPT | Performed by: FAMILY MEDICINE

## 2024-01-31 RX ORDER — CEFDINIR 300 MG/1
300 CAPSULE ORAL 2 TIMES DAILY
Qty: 10 CAPSULE | Refills: 0 | Status: SHIPPED | OUTPATIENT
Start: 2024-01-31 | End: 2024-02-05

## 2024-01-31 ASSESSMENT — FIBROSIS 4 INDEX: FIB4 SCORE: 0.88

## 2024-01-31 NOTE — PROGRESS NOTES
Subjective     Michelle Mckeon is a 88 y.o. female who presents with UTI (X 3 weeks, frequency, nausea.)    - (bib relative) This is a very pleasant 88 y.o. who has come to the walk-in clinic today for approximately 2 to 3 weeks feeling some malaise and fatigue little bit of nausea at times and having some urinary frequency.  States in the past when she has a urinary tract infection these are the symptoms she has.  No vomiting fevers chills.  No diarrhea.  No abdominal pain.    Hx CKD, GFR  49 9/23     Hx HTN, stable naomi current meds      ALLERGIES:  Sulfa drugs     PMH:  Past Medical History:   Diagnosis Date    Arthritis     Chronic kidney disease     Disorder of thyroid     Fall     Hypertension         PSH:  Past Surgical History:   Procedure Laterality Date    LUMBAR FUSION O-ARM  10/31/2022    Procedure: FUSION, SPINE, LIMBAR, TLIF, WITH O-ARM GUIDANCE L3-5 ;  Surgeon: Tammy Rowland M.D.;  Location: SURGERY [facility];  Service: Neurosurgery    LUMBAR LAMINECTOMY DISKECTOMY  10/31/2022    Procedure: LAMINECTOMY, SPINE, LUMBAR, L3-5;  Surgeon: Tammy Rowland M.D.;  Location: SURGERY [facility];  Service: Neurosurgery    FORAMINOTOMY Bilateral 10/31/2022    Procedure: FORAMINOTOMY, SPINE;  Surgeon: Tammy Rowland M.D.;  Location: SURGERY [facility];  Service: Neurosurgery    LUMBAR DECOMPRESSION  10/31/2022    Procedure: DECOMPRESSION, SPINE, LUMBAR L3-4, 4-5;  Surgeon: Tammy Rowland M.D.;  Location: SURGERY [facility];  Service: Neurosurgery    OTHER ORTHOPEDIC SURGERY      Blateral knee replacements       MEDS:    Current Outpatient Medications:     cefdinir (OMNICEF) 300 MG Cap, Take 1 Capsule by mouth 2 times a day for 5 days., Disp: 10 Capsule, Rfl: 0    meloxicam (MOBIC) 7.5 MG Tab, Take 1 Tablet by mouth every day for 30 days. With food. No advil/aleve/motrin/ibuprofen on same day., Disp: 30 Tablet, Rfl: 5    lamoTRIgine (LAMICTAL) 25 MG Tab, TAKE 1 TAB BY MOUTH AT BEDTIME X 1 WEEK THEN  INCREASE TO 1 TAB BY MOUTH TWICE A DAY X 1 WEEK THEN INCREASE 1 TAB BY MOUTH IN THE MORNING AND 2 TABS BY MOUTH AT BEDTIME X 1 WEEK THEN INCREASE TO TAKE 2 TABS BY MOUTH TWICE A DAY ., Disp: , Rfl:     LIPITOR 40 MG Tab, , Disp: , Rfl:     aspirin (MIRELLA LOW DOSE) 81 MG Chew Tab chewable tablet, , Disp: , Rfl:     scopolamine (TRANSDERM-SCOP) 1 mg/72hr PATCH 72 HR, APPLY 1 PATCH BEHIND EAR EVERY 72 HOURS, Disp: , Rfl:     oxyCODONE immediate-release (ROXICODONE) 5 MG Tab, Take 1 Tablet by mouth every 8 hours as needed., Disp: , Rfl:     traMADol (ULTRAM) 50 MG Tab, TAKE 1 TABLET BY MOUTH EVERY 6 TO 8 HOURS AS NEEDED, Disp: , Rfl:     tizanidine (ZANAFLEX) 4 MG Tab, Take 1 Tablet by mouth 4 times a day., Disp: , Rfl:     lisinopril (PRINIVIL) 2.5 MG Tab, Take 1 Tablet by mouth every day., Disp: , Rfl:     gabapentin (NEURONTIN) 100 MG Cap, Take 1 Capsule by mouth 3 times a day., Disp: 90 Capsule, Rfl: 0    lidocaine (LIDOCAINE PAIN RELIEF) 4 % Patch, OTC as directed, Disp: 14 Patch, Rfl: 0    acetaminophen (TYLENOL) 500 MG Tab, Take 1,000 mg by mouth every 6 hours as needed for Moderate Pain., Disp: , Rfl:     lisinopril (PRINIVIL) 2.5 MG Tab, Take 2.5 mg by mouth every day., Disp: , Rfl:     MAGNESIUM PO, Take 1 Capsule by mouth every day., Disp: , Rfl:     VITAMIN D, CHOLECALCIFEROL, PO, Take  by mouth every day., Disp: , Rfl:     alendronate (FOSAMAX) 70 MG Tab, Take 70 mg by mouth every 7 days. On Wed, Disp: , Rfl:     levothyroxine (SYNTHROID) 75 MCG Tab, Take 1 Tab by mouth Every morning on an empty stomach. Take one tablet 5 days a week; T,W,F,S,S   Indications: Underactive Thyroid, Disp: 30 Tab, Rfl: 11    levothyroxine (SYNTHROID) 50 MCG Tab, Take 1 Tab by mouth Every morning on an empty stomach. Take 1 tab on Monday and Thursday  Indications: Underactive Thyroid, Disp: 30 Tab, Rfl: 11    ** I have documented what I find to be significant in regards to past medical, social, family and surgical history  in my  "HPI or under PMH/PSH/FH review section, otherwise it is noncontributory **           HPI    Review of Systems   Constitutional:  Positive for malaise/fatigue.   Genitourinary:  Positive for frequency.   All other systems reviewed and are negative.             Objective     /60   Pulse 78   Temp 36.7 °C (98.1 °F) (Temporal)   Ht 1.575 m (5' 2\")   Wt 52.2 kg (115 lb)   SpO2 97%   BMI 21.03 kg/m²      Physical Exam  Vitals and nursing note reviewed.   Constitutional:       General: She is not in acute distress.     Appearance: Normal appearance. She is well-developed. She is not ill-appearing, toxic-appearing or diaphoretic.   HENT:      Head: Normocephalic.      Mouth/Throat:      Mouth: Mucous membranes are moist.      Pharynx: Oropharynx is clear.   Eyes:      Conjunctiva/sclera: Conjunctivae normal.   Cardiovascular:      Rate and Rhythm: Normal rate and regular rhythm.      Heart sounds: Normal heart sounds.   Pulmonary:      Effort: Pulmonary effort is normal. No respiratory distress.   Abdominal:      Palpations: Abdomen is soft.      Tenderness: There is no abdominal tenderness. There is no guarding or rebound.   Neurological:      Mental Status: She is alert.      Motor: No abnormal muscle tone.   Psychiatric:         Mood and Affect: Mood normal.         Behavior: Behavior normal.               Recent Results (from the past 1 hour(s))   POCT Urinalysis    Collection Time: 01/31/24  8:44 AM   Result Value Ref Range    POC Color DARK YELLOW Negative    POC Appearance SLIGHTLY CLOUDY Negative    POC Glucose NEG Negative mg/dL    POC Bilirubin NEG Negative mg/dL    POC Ketones NEG Negative mg/dL    POC Specific Gravity 1.020 <1.005 - >1.030    POC Blood NEG Negative    POC Urine PH 5.5 5.0 - 8.0    POC Protein NEG Negative mg/dL    POC Urobiligen 0.2 Negative (0.2) mg/dL    POC Nitrites NEG Negative    POC Leukocyte Esterase SMALL Negative                    Assessment & Plan     1. Urinary frequency  " POCT Urinalysis    URINE CULTURE(NEW)    cefdinir (OMNICEF) 300 MG Cap      2. Malaise and fatigue  cefdinir (OMNICEF) 300 MG Cap      3. Primary hypertension        4. Stage 3a chronic kidney disease (HCC)            - Dx, plan & d/c instructions discussed   - Rest, stay hydrated    Follow up with your regular primary care providers office to keep them updated and informed of this visit and for regular routine health maintenance check-ups. ER if not improving in 2 days or if feeling/getting worse. (If you do not have a primary care provider and need to schedule one you may call [facility] at [phone] to do this).    Any realistic side effects of medications that may have been given today reviewed.     Patient left in stable condition     POCT results reviewed/discussed    Pertinent prior lab work and/or imaging studies in Epic have been reviewed by me today on day of this visit and taken into account for my treatment and plan today    Pertinent PMH/PSH and/or chronic conditions and medications if any were reviewed today and taken into account for my treatment and plan today    Pertinent prior office visit notes in [facility] have been reviewed by me today on day of this visit.    Please note that this dictation may have been created using voice recognition software, if so I have made every reasonable attempt to correct obvious errors, but I expect that there are errors of grammar and possibly content that I did not discover before finalizing the note.

## 2024-02-02 LAB
BACTERIA UR CULT: NORMAL
SIGNIFICANT IND 70042: NORMAL
SITE SITE: NORMAL
SOURCE SOURCE: NORMAL

## 2024-02-16 ENCOUNTER — HOSPITAL ENCOUNTER (OUTPATIENT)
Facility: MEDICAL CENTER | Age: 89
End: 2024-02-16
Attending: FAMILY MEDICINE
Payer: COMMERCIAL

## 2024-02-16 LAB
AMBIGUOUS DTTM AMBI4: NORMAL
SIGNIFICANT IND 70042: NORMAL
SITE SITE: NORMAL
SOURCE SOURCE: NORMAL

## 2024-02-16 PROCEDURE — 87086 URINE CULTURE/COLONY COUNT: CPT

## 2024-02-19 LAB
BACTERIA UR CULT: NORMAL
SIGNIFICANT IND 70042: NORMAL
SITE SITE: NORMAL
SOURCE SOURCE: NORMAL

## 2024-03-06 ENCOUNTER — HOSPITAL ENCOUNTER (OUTPATIENT)
Dept: LAB | Facility: MEDICAL CENTER | Age: 89
End: 2024-03-06
Attending: FAMILY MEDICINE
Payer: COMMERCIAL

## 2024-03-06 LAB
ACANTHOCYTES BLD QL SMEAR: NORMAL
ANISOCYTOSIS BLD QL SMEAR: ABNORMAL
BASOPHILS # BLD AUTO: 2.6 % (ref 0–1.8)
BASOPHILS # BLD: 0.44 K/UL (ref 0–0.12)
BURR CELLS BLD QL SMEAR: NORMAL
EOSINOPHIL # BLD AUTO: 0.87 K/UL (ref 0–0.51)
EOSINOPHIL NFR BLD: 5.1 % (ref 0–6.9)
ERYTHROCYTE [DISTWIDTH] IN BLOOD BY AUTOMATED COUNT: 62.9 FL (ref 35.9–50)
HCT VFR BLD AUTO: 40.6 % (ref 37–47)
HGB BLD-MCNC: 13.2 G/DL (ref 12–16)
LYMPHOCYTES # BLD AUTO: 1.6 K/UL (ref 1–4.8)
LYMPHOCYTES NFR BLD: 9.4 % (ref 22–41)
MANUAL DIFF BLD: NORMAL
MCH RBC QN AUTO: 29.6 PG (ref 27–33)
MCHC RBC AUTO-ENTMCNC: 32.5 G/DL (ref 32.2–35.5)
MCV RBC AUTO: 91 FL (ref 81.4–97.8)
MICROCYTES BLD QL SMEAR: ABNORMAL
MONOCYTES # BLD AUTO: 1.45 K/UL (ref 0–0.85)
MONOCYTES NFR BLD AUTO: 8.5 % (ref 0–13.4)
MORPHOLOGY BLD-IMP: NORMAL
NEUTROPHILS # BLD AUTO: 12.65 K/UL (ref 1.82–7.42)
NEUTROPHILS NFR BLD: 74.4 % (ref 44–72)
NRBC # BLD AUTO: 0 K/UL
NRBC BLD-RTO: 0 /100 WBC (ref 0–0.2)
PLATELET # BLD AUTO: 438 K/UL (ref 164–446)
PLATELET BLD QL SMEAR: NORMAL
PMV BLD AUTO: 11 FL (ref 9–12.9)
POIKILOCYTOSIS BLD QL SMEAR: NORMAL
RBC # BLD AUTO: 4.46 M/UL (ref 4.2–5.4)
RBC BLD AUTO: PRESENT
SCHISTOCYTES BLD QL SMEAR: NORMAL
WBC # BLD AUTO: 17 K/UL (ref 4.8–10.8)

## 2024-03-06 PROCEDURE — 85007 BL SMEAR W/DIFF WBC COUNT: CPT

## 2024-03-06 PROCEDURE — 36415 COLL VENOUS BLD VENIPUNCTURE: CPT

## 2024-03-06 PROCEDURE — 85027 COMPLETE CBC AUTOMATED: CPT

## 2024-04-01 ENCOUNTER — APPOINTMENT (OUTPATIENT)
Dept: RADIOLOGY | Facility: IMAGING CENTER | Age: 89
End: 2024-04-01
Attending: PHYSICIAN ASSISTANT
Payer: COMMERCIAL

## 2024-04-01 ENCOUNTER — OFFICE VISIT (OUTPATIENT)
Dept: URGENT CARE | Facility: CLINIC | Age: 89
End: 2024-04-01
Payer: COMMERCIAL

## 2024-04-01 VITALS
RESPIRATION RATE: 20 BRPM | HEART RATE: 80 BPM | DIASTOLIC BLOOD PRESSURE: 74 MMHG | OXYGEN SATURATION: 98 % | WEIGHT: 115 LBS | TEMPERATURE: 98 F | HEIGHT: 62 IN | BODY MASS INDEX: 21.16 KG/M2 | SYSTOLIC BLOOD PRESSURE: 116 MMHG

## 2024-04-01 DIAGNOSIS — R05.1 ACUTE COUGH: ICD-10-CM

## 2024-04-01 DIAGNOSIS — R05.8 POST-VIRAL COUGH SYNDROME: ICD-10-CM

## 2024-04-01 PROCEDURE — 3078F DIAST BP <80 MM HG: CPT | Performed by: PHYSICIAN ASSISTANT

## 2024-04-01 PROCEDURE — 3074F SYST BP LT 130 MM HG: CPT | Performed by: PHYSICIAN ASSISTANT

## 2024-04-01 PROCEDURE — 71046 X-RAY EXAM CHEST 2 VIEWS: CPT | Mod: TC,FY | Performed by: RADIOLOGY

## 2024-04-01 PROCEDURE — 99213 OFFICE O/P EST LOW 20 MIN: CPT | Performed by: PHYSICIAN ASSISTANT

## 2024-04-01 RX ORDER — BENZONATATE 100 MG/1
100 CAPSULE ORAL 3 TIMES DAILY PRN
Qty: 20 CAPSULE | Refills: 0 | Status: SHIPPED | OUTPATIENT
Start: 2024-04-01

## 2024-04-01 ASSESSMENT — ENCOUNTER SYMPTOMS: COUGH: 1

## 2024-04-01 ASSESSMENT — FIBROSIS 4 INDEX: FIB4 SCORE: 1.06

## 2024-04-01 NOTE — PROGRESS NOTES
Subjective:   Michelle Mckeon is a 88 y.o. female who presents for Cough (3 weeks)   This is a very pleasant 88-year-old female who presents with 3-week history of cough.  She states her family was all COVID-positive at that time when she became ill.  Her COVID test was negative.  Her symptoms have resolved other than the cough which has been ongoing, spasmodic at times prompting evaluation today.  She does endorse fatigue.  She denies fevers chills or myalgias.  She has no underlying respiratory illnesses.  She has tried some over-the-counter cough medications without improvement.  Her cough is productive of phlegm.  She denies shortness of breath.  She does have a history of pneumonia as a child.        Review of Systems   Respiratory:  Positive for cough.        Medications:  acetaminophen Tabs  alendronate Tabs  aspirin Chew  gabapentin Caps  lamoTRIgine Tabs  levothyroxine Tabs  lidocaine Ptch  Lipitor Tabs  lisinopril Tabs  MAGNESIUM PO  oxyCODONE immediate-release Tabs  scopolamine Pt72  tizanidine Tabs  traMADol Tabs  VITAMIN D (CHOLECALCIFEROL) PO    Allergies:             Sulfa drugs    Surgical History:         Past Surgical History:   Procedure Laterality Date    LUMBAR FUSION O-ARM  10/31/2022    Procedure: FUSION, SPINE, LIMBAR, TLIF, WITH O-ARM GUIDANCE L3-5 ;  Surgeon: Tammy Rowland M.D.;  Location: [facility];  Service: Neurosurgery    LUMBAR LAMINECTOMY DISKECTOMY  10/31/2022    Procedure: LAMINECTOMY, SPINE, LUMBAR, L3-5;  Surgeon: Tammy Rowland M.D.;  Location: SURGERY [facility];  Service: Neurosurgery    FORAMINOTOMY Bilateral 10/31/2022    Procedure: FORAMINOTOMY, SPINE;  Surgeon: Tammy Rowland M.D.;  Location: SURGERY [facility];  Service: Neurosurgery    LUMBAR DECOMPRESSION  10/31/2022    Procedure: DECOMPRESSION, SPINE, LUMBAR L3-4, 4-5;  Surgeon: Tammy Rowland M.D.;  Location: [facility];  Service: Neurosurgery    OTHER ORTHOPEDIC SURGERY      Blateral  "knee replacements       Past Social Hx:  Michelle Mckeon  reports that she quit smoking about 59 years ago. Her smoking use included cigarettes. She has never been exposed to tobacco smoke. She has never used smokeless tobacco. She reports that she does not currently use alcohol after a past usage of about 4.2 oz of alcohol per week. She reports that she does not use drugs.     Past Family Hx:   Michelle Mckeon family history is not on file.       Problem list, medications, and allergies reviewed by myself today in Epic.     Objective:     /74   Pulse 80   Temp 36.7 °C (98 °F) (Temporal)   Resp 20   Ht 1.575 m (5' 2\")   Wt 52.2 kg (115 lb)   SpO2 98%   BMI 21.03 kg/m²     Physical Exam  Vitals and nursing note reviewed.   Constitutional:       General: She is not in acute distress.     Appearance: She is well-developed. She is not ill-appearing or diaphoretic.   HENT:      Head: Normocephalic.      Right Ear: Tympanic membrane, ear canal and external ear normal.      Left Ear: Tympanic membrane, ear canal and external ear normal.      Nose: Mucosal edema and rhinorrhea present.      Mouth/Throat:      Pharynx: Posterior oropharyngeal erythema present.   Eyes:      General:         Right eye: No discharge.         Left eye: No discharge.      Conjunctiva/sclera: Conjunctivae normal.      Pupils: Pupils are equal, round, and reactive to light.   Cardiovascular:      Rate and Rhythm: Normal rate and regular rhythm.      Pulses: Normal pulses.      Heart sounds: Normal heart sounds. No murmur heard.     No friction rub.   Pulmonary:      Effort: Pulmonary effort is normal. No accessory muscle usage or respiratory distress.      Breath sounds: No stridor. No wheezing, rhonchi or rales.      Comments: Lungs are clear to auscultation bilaterally, no rhonchi rales or wheezes  Abdominal:      Palpations: Abdomen is soft.   Musculoskeletal:         General: Normal range of motion.      Cervical back: " Normal range of motion.      Right lower leg: No edema.      Left lower leg: No edema.   Lymphadenopathy:      Cervical: No cervical adenopathy.   Skin:     General: Skin is warm and dry.   Neurological:      Mental Status: She is alert and oriented to person, place, and time.       RADIOLOGY RESULTS   DX-CHEST-2 VIEWS    Result Date: 4/1/2024 4/1/2024 3:25 PM HISTORY/REASON FOR EXAM: Cough TECHNIQUE/EXAM DESCRIPTION AND NUMBER OF VIEWS: Two views of the chest. COMPARISON: 12/6/2023 FINDINGS: There is no evidence of focal consolidation or evidence of pulmonary edema. The heart is normal in size. There is no evidence of pleural effusion. Postsurgical changes are again seen.     No evidence of acute cardiopulmonary process.             Assessment/Plan:     Diagnosis and Associated Orders:     1. Acute cough  - DX-CHEST-2 VIEWS  - benzonatate (TESSALON) 100 MG Cap; Take 1 Capsule by mouth 3 times a day as needed for Cough.  Dispense: 20 Capsule; Refill: 0    2. Post-viral cough syndrome        Comments/MDM:  Patient presents with 3-week history of cough with presumptive COVID diagnosis based on family testing positive around the same time of onset of her illness 3 weeks ago.  Suspect post viral cough. Her symptoms have resolved other than the cough.  Given her age chest x-ray was ordered which was negative for acute cardiopulmonary process.  Discussed conservative measures.  Today on exam vital signs are stable and reassuring.  Her lungs are clear to auscultation bilaterally.  She is not hypoxic and nontachypneic.  Low suspicion for bacterial pneumonia at this time based on negative chest xray and stable vital signs without systemic symptoms.  Increase water intake  May use Ibuprofen/Tylenol prn for fever or body aches  Get rest  May use daily longer acting antihistamine prn  May use saline nasal spray prn to flush any nasal congestion  May use Flonase for persistent nasal congestion and postnasal drainage  Recommend  Mucinex as expectorant  May use OTC cough suppressant medications like Robitussin/Delsym prn  Monitor for fevers, productive cough, SOB, CP, chest tightness- need re-evaluation    Patient advised to follow up in 5-7 days or sooner if cough persists for re-evaluation.  No indication for antibiotics today.      I personally reviewed prior external notes and test results pertinent to today's visit. Supportive care, natural history, differential diagnoses, and indications for immediate follow-up discussed. Return to clinic or go to ED if symptoms worsen or persist.  Red flag symptoms discussed.  Patient/Parent/Guardian voices understanding. Follow-up with your primary care provider in 3-5 days.  All side effects of medication discussed including allergic response, GI upset, tendon injury, rash, sedation etc    Please note that this dictation was created using voice recognition software. I have made a reasonable attempt to correct obvious errors, but I expect that there are errors of grammar and possibly content that I did not discover before finalizing the note.    This note was electronically signed by Lanie Mendieta PA-C

## 2024-10-11 ENCOUNTER — HOSPITAL ENCOUNTER (OUTPATIENT)
Facility: MEDICAL CENTER | Age: 89
End: 2024-10-11
Attending: INTERNAL MEDICINE
Payer: COMMERCIAL

## 2024-10-11 PROCEDURE — 83615 LACTATE (LD) (LDH) ENZYME: CPT

## 2024-10-11 PROCEDURE — 80053 COMPREHEN METABOLIC PANEL: CPT

## 2024-10-12 ENCOUNTER — HOSPITAL ENCOUNTER (OUTPATIENT)
Dept: RADIOLOGY | Facility: MEDICAL CENTER | Age: 89
End: 2024-10-12
Attending: NURSE PRACTITIONER
Payer: COMMERCIAL

## 2024-10-12 DIAGNOSIS — R41.3 AMNESIA: ICD-10-CM

## 2024-10-12 LAB
ALBUMIN SERPL BCP-MCNC: 4.7 G/DL (ref 3.2–4.9)
ALBUMIN/GLOB SERPL: 3.6 G/DL
ALP SERPL-CCNC: 90 U/L (ref 30–99)
ALT SERPL-CCNC: 20 U/L (ref 2–50)
ANION GAP SERPL CALC-SCNC: 13 MMOL/L (ref 7–16)
AST SERPL-CCNC: 21 U/L (ref 12–45)
BILIRUB SERPL-MCNC: 1 MG/DL (ref 0.1–1.5)
BUN SERPL-MCNC: 23 MG/DL (ref 8–22)
CALCIUM ALBUM COR SERPL-MCNC: 9 MG/DL (ref 8.5–10.5)
CALCIUM SERPL-MCNC: 9.6 MG/DL (ref 8.5–10.5)
CHLORIDE SERPL-SCNC: 107 MMOL/L (ref 96–112)
CO2 SERPL-SCNC: 23 MMOL/L (ref 20–33)
CREAT SERPL-MCNC: 1.14 MG/DL (ref 0.5–1.4)
GFR SERPLBLD CREATININE-BSD FMLA CKD-EPI: 46 ML/MIN/1.73 M 2
GLOBULIN SER CALC-MCNC: 1.3 G/DL (ref 1.9–3.5)
GLUCOSE SERPL-MCNC: 117 MG/DL (ref 65–99)
LDH SERPL L TO P-CCNC: 219 U/L (ref 107–266)
POTASSIUM SERPL-SCNC: 5.6 MMOL/L (ref 3.6–5.5)
PROT SERPL-MCNC: 6 G/DL (ref 6–8.2)
SODIUM SERPL-SCNC: 143 MMOL/L (ref 135–145)

## 2024-10-12 PROCEDURE — 70450 CT HEAD/BRAIN W/O DYE: CPT

## 2025-01-30 ENCOUNTER — HOSPITAL ENCOUNTER (OUTPATIENT)
Dept: LAB | Facility: MEDICAL CENTER | Age: 89
End: 2025-01-30
Attending: FAMILY MEDICINE
Payer: COMMERCIAL

## 2025-01-30 LAB
ALBUMIN SERPL BCP-MCNC: 4.5 G/DL (ref 3.2–4.9)
ALBUMIN/GLOB SERPL: 2.8 G/DL
ALP SERPL-CCNC: 96 U/L (ref 30–99)
ALT SERPL-CCNC: 15 U/L (ref 2–50)
ANION GAP SERPL CALC-SCNC: 14 MMOL/L (ref 7–16)
ANISOCYTOSIS BLD QL SMEAR: ABNORMAL
APPEARANCE UR: CLEAR
AST SERPL-CCNC: 17 U/L (ref 12–45)
BACTERIA #/AREA URNS HPF: ABNORMAL /HPF
BASOPHILS # BLD AUTO: 0.9 % (ref 0–1.8)
BASOPHILS # BLD: 0.12 K/UL (ref 0–0.12)
BILIRUB SERPL-MCNC: 0.4 MG/DL (ref 0.1–1.5)
BILIRUB UR QL STRIP.AUTO: NEGATIVE
BUN SERPL-MCNC: 26 MG/DL (ref 8–22)
BURR CELLS BLD QL SMEAR: NORMAL
CALCIUM ALBUM COR SERPL-MCNC: 8.3 MG/DL (ref 8.5–10.5)
CALCIUM SERPL-MCNC: 8.7 MG/DL (ref 8.5–10.5)
CASTS URNS QL MICRO: ABNORMAL /LPF (ref 0–2)
CHLORIDE SERPL-SCNC: 104 MMOL/L (ref 96–112)
CHOLEST SERPL-MCNC: 115 MG/DL (ref 100–199)
CO2 SERPL-SCNC: 23 MMOL/L (ref 20–33)
COLOR UR: YELLOW
CREAT SERPL-MCNC: 1.14 MG/DL (ref 0.5–1.4)
EOSINOPHIL # BLD AUTO: 0 K/UL (ref 0–0.51)
EOSINOPHIL NFR BLD: 0 % (ref 0–6.9)
EPITHELIAL CELLS 1715: ABNORMAL /HPF (ref 0–5)
ERYTHROCYTE [DISTWIDTH] IN BLOOD BY AUTOMATED COUNT: 61.3 FL (ref 35.9–50)
EST. AVERAGE GLUCOSE BLD GHB EST-MCNC: 120 MG/DL
GFR SERPLBLD CREATININE-BSD FMLA CKD-EPI: 46 ML/MIN/1.73 M 2
GLOBULIN SER CALC-MCNC: 1.6 G/DL (ref 1.9–3.5)
GLUCOSE SERPL-MCNC: 99 MG/DL (ref 65–99)
GLUCOSE UR STRIP.AUTO-MCNC: NEGATIVE MG/DL
HBA1C MFR BLD: 5.8 % (ref 4–5.6)
HCT VFR BLD AUTO: 38.2 % (ref 37–47)
HDLC SERPL-MCNC: 74 MG/DL
HGB BLD-MCNC: 12.6 G/DL (ref 12–16)
KETONES UR STRIP.AUTO-MCNC: NEGATIVE MG/DL
LDLC SERPL CALC-MCNC: 33 MG/DL
LEUKOCYTE ESTERASE UR QL STRIP.AUTO: ABNORMAL
LYMPHOCYTES # BLD AUTO: 1.45 K/UL (ref 1–4.8)
LYMPHOCYTES NFR BLD: 11.3 % (ref 22–41)
MANUAL DIFF BLD: NORMAL
MCH RBC QN AUTO: 32.6 PG (ref 27–33)
MCHC RBC AUTO-ENTMCNC: 33 G/DL (ref 32.2–35.5)
MCV RBC AUTO: 98.7 FL (ref 81.4–97.8)
MICRO URNS: ABNORMAL
MICROCYTES BLD QL SMEAR: ABNORMAL
MONOCYTES # BLD AUTO: 1.89 K/UL (ref 0–0.85)
MONOCYTES NFR BLD AUTO: 14.8 % (ref 0–13.4)
MORPHOLOGY BLD-IMP: NORMAL
MYELOCYTES NFR BLD MANUAL: 0.9 %
NEUTROPHILS # BLD AUTO: 9.23 K/UL (ref 1.82–7.42)
NEUTROPHILS NFR BLD: 70.4 % (ref 44–72)
NEUTS BAND NFR BLD MANUAL: 1.7 % (ref 0–10)
NITRITE UR QL STRIP.AUTO: NEGATIVE
NRBC # BLD AUTO: 0 K/UL
NRBC BLD-RTO: 0 /100 WBC (ref 0–0.2)
PH UR STRIP.AUTO: 7 [PH] (ref 5–8)
PLATELET # BLD AUTO: 256 K/UL (ref 164–446)
PLATELET BLD QL SMEAR: NORMAL
PMV BLD AUTO: 11 FL (ref 9–12.9)
POIKILOCYTOSIS BLD QL SMEAR: NORMAL
POTASSIUM SERPL-SCNC: 4.8 MMOL/L (ref 3.6–5.5)
PROT SERPL-MCNC: 6.1 G/DL (ref 6–8.2)
PROT UR QL STRIP: NEGATIVE MG/DL
RBC # BLD AUTO: 3.87 M/UL (ref 4.2–5.4)
RBC # URNS HPF: ABNORMAL /HPF (ref 0–2)
RBC BLD AUTO: PRESENT
RBC UR QL AUTO: NEGATIVE
SCHISTOCYTES BLD QL SMEAR: NORMAL
SODIUM SERPL-SCNC: 141 MMOL/L (ref 135–145)
SP GR UR STRIP.AUTO: 1.01
T4 SERPL-MCNC: 6.9 UG/DL (ref 4–12)
TRIGL SERPL-MCNC: 39 MG/DL (ref 0–149)
TSH SERPL-ACNC: 2.1 UIU/ML (ref 0.35–5.5)
UROBILINOGEN UR STRIP.AUTO-MCNC: 0.2 EU/DL
WBC # BLD AUTO: 12.8 K/UL (ref 4.8–10.8)
WBC #/AREA URNS HPF: ABNORMAL /HPF

## 2025-01-30 PROCEDURE — 85027 COMPLETE CBC AUTOMATED: CPT

## 2025-01-30 PROCEDURE — 84436 ASSAY OF TOTAL THYROXINE: CPT

## 2025-01-30 PROCEDURE — 81001 URINALYSIS AUTO W/SCOPE: CPT

## 2025-01-30 PROCEDURE — 36415 COLL VENOUS BLD VENIPUNCTURE: CPT

## 2025-01-30 PROCEDURE — 87086 URINE CULTURE/COLONY COUNT: CPT

## 2025-01-30 PROCEDURE — 80061 LIPID PANEL: CPT

## 2025-01-30 PROCEDURE — 83036 HEMOGLOBIN GLYCOSYLATED A1C: CPT

## 2025-01-30 PROCEDURE — 80053 COMPREHEN METABOLIC PANEL: CPT

## 2025-01-30 PROCEDURE — 85007 BL SMEAR W/DIFF WBC COUNT: CPT

## 2025-01-30 PROCEDURE — 84443 ASSAY THYROID STIM HORMONE: CPT

## 2025-01-31 LAB
BACTERIA UR CULT: NORMAL
SIGNIFICANT IND 70042: NORMAL
SITE SITE: NORMAL
SOURCE SOURCE: NORMAL

## 2025-02-01 LAB
BACTERIA UR CULT: NORMAL
SIGNIFICANT IND 70042: NORMAL
SITE SITE: NORMAL
SOURCE SOURCE: NORMAL

## 2025-02-21 ENCOUNTER — APPOINTMENT (OUTPATIENT)
Dept: RADIOLOGY | Facility: MEDICAL CENTER | Age: 89
End: 2025-02-21
Attending: EMERGENCY MEDICINE
Payer: MEDICARE

## 2025-02-21 ENCOUNTER — HOSPITAL ENCOUNTER (EMERGENCY)
Facility: MEDICAL CENTER | Age: 89
End: 2025-02-21
Attending: EMERGENCY MEDICINE
Payer: MEDICARE

## 2025-02-21 VITALS
TEMPERATURE: 97.1 F | HEIGHT: 62 IN | HEART RATE: 76 BPM | SYSTOLIC BLOOD PRESSURE: 135 MMHG | OXYGEN SATURATION: 96 % | DIASTOLIC BLOOD PRESSURE: 76 MMHG | WEIGHT: 127.65 LBS | RESPIRATION RATE: 16 BRPM | BODY MASS INDEX: 23.49 KG/M2

## 2025-02-21 DIAGNOSIS — S09.90XA MINOR HEAD INJURY, INITIAL ENCOUNTER: ICD-10-CM
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
## 2025-02-21 PROCEDURE — 72125 CT NECK SPINE W/O DYE: CPT

## 2025-02-21 PROCEDURE — 70450 CT HEAD/BRAIN W/O DYE: CPT

## 2025-02-21 PROCEDURE — 99284 EMERGENCY DEPT VISIT MOD MDM: CPT

## 2025-02-21 ASSESSMENT — FIBROSIS 4 INDEX: FIB4 SCORE: 1.51

## 2025-02-22 NOTE — ED TRIAGE NOTES
"Chief Complaint   Patient presents with    GLF     Patient ambulates to triage with 4 point cane and assistance from daughter. Around 1700 today, patient states she was pulling a cart table walking backwards, tripped, fell backwards, hit the back of her head. Patient reports headache, neck pain, dizzy. Patient AA&Ox4.     Head Injury     Back of the head     BP (!) 150/83   Pulse 75   Temp (!) 35.6 °C (96 °F) (Temporal)   Resp 20   Ht 1.575 m (5' 2\")   Wt 57.9 kg (127 lb 10.3 oz)   SpO2 93%   BMI 23.35 kg/m²    "

## 2025-02-22 NOTE — ED PROVIDER NOTES
"ED Provider Note    Primary care provider: Eliane Lloyd M.D.    CHIEF COMPLAINT  Chief Complaint   Patient presents with    GLF     Patient ambulates to triage with 4 point cane and assistance from daughter. Around 1700 today, patient states she was pulling a cart table walking backwards, tripped, fell backwards, hit the back of her head. Patient reports headache, neck pain, dizzy. Patient AA&Ox4.     Head Injury     Back of the head       HPI  Michelle Mckeon is a 89 y.o. female who presents to the Emergency Department for a head injury.  The patient was walking backwards, pulling a cart and she tripped over the dog bed.,  Landed backwards, struck the back of her head.  Denies any loss of consciousness.  Denies any numbness or weakness.  Has been ambulatory since the fall.    External record review: Patient followed up in the outpatient clinic in October 2020 for, history of ischemic stroke, they have on 81 mg of aspirin daily.       PAST MEDICAL HISTORY   has a past medical history of Arthritis, Chronic kidney disease, Disorder of thyroid, Fall, and Hypertension.    SURGICAL HISTORY   has a past surgical history that includes other orthopedic surgery; lumbar fusion o-arm (10/31/2022); lumbar laminectomy diskectomy (10/31/2022); foraminotomy (Bilateral, 10/31/2022); and lumbar decompression (10/31/2022).    PHYSICAL EXAM  VITAL SIGNS: /76   Pulse 76   Temp 36.2 °C (97.1 °F) (Temporal)   Resp 16   Ht 1.575 m (5' 2\")   Wt 57.9 kg (127 lb 10.3 oz)   SpO2 96%   BMI 23.35 kg/m²   Constitutional: Awake, alert in no apparent distress.  HENT: Normocephalic, Bilateral external ears normal. Nose normal.  No obvious head trauma, no raccoon eyes, no andrade signs, no midline neck tenderness.  Eyes: Conjunctiva normal, non-icteric, EOMI.    Thorax & Lungs: Easy unlabored respirations  Cardiovascular:    Abdomen:  No distention  Skin: Visualized skin is  Dry, No erythema, No rash.   Extremities:   atraumatic, " pelvis stable  Neurologic: Alert, Grossly non-focal.   Psychiatric: Affect and Mood normal      RADIOLOGY  I have independently interpreted the diagnostic imaging associated with this visit and am waiting the final reading from the radiologist.   My preliminary interpretation is as follows: No intracranial hemorrhage.    Radiologist interpretation:   CT-CSPINE WITHOUT PLUS RECONS   Final Result         1.  Multilevel degenerative changes of the cervical spine limit diagnostic sensitivity of this examination, otherwise no acute traumatic bony injury of the cervical spine is apparent.   2.  Minimal patchy infiltrates in the right apex   3.  Atherosclerosis      CT-HEAD W/O   Final Result         1.  No acute intracranial abnormality is identified, there are nonspecific white matter changes, commonly associated with small vessel ischemic disease.  Associated mild cerebral atrophy is noted.   2.  Atherosclerosis.                   COURSE & MEDICAL DECISION MAKING  Pertinent Labs & Imaging studies reviewed. (See chart for details)    Escalation of care considered, and ultimately not performed: Laboratory studies       Decision tools and prescription drugs considered including, but not limited to: Unable to clear using Bur[place]ese or Nexus criteria due to advanced age..    Decision Making:  This is a pleasant 89 y.o. year old female who presents  after mechanical trip and fall.  The patient looks great, she is very well-appearing for an 89-year-old.  She is neurologically intact.  Given her advanced age, antiplatelet use, did obtain head imaging, unable to clear using Nexus either, therefore  CT C-spine has been done.  These do not show any acute process.  The patient is now safe for discharge.       The patient was discharged (see d/c instructions) was told to return immediately for any signs or symptoms listed, or any worsening at all.  The patient verbally agreed to the discharge precautions and follow-up plan which is  documented in EPIC.    Discharge Medications:  Discharge Medication List as of 2/21/2025  8:45 PM          FINAL IMPRESSION  1. Fall, initial encounter    2. Minor head injury, initial encounter

## 2025-05-28 ENCOUNTER — HOSPITAL ENCOUNTER (OUTPATIENT)
Dept: RADIOLOGY | Facility: MEDICAL CENTER | Age: 89
End: 2025-05-28
Attending: NURSE PRACTITIONER
Payer: MEDICARE

## 2025-05-28 DIAGNOSIS — R13.19 CONSTANT LOW-GRADE DYSPHAGIA: ICD-10-CM

## 2025-05-28 DIAGNOSIS — K21.9 CHALASIA OF LOWER ESOPHAGEAL SPHINCTER: ICD-10-CM

## 2025-05-28 DIAGNOSIS — R13.10 PROBLEMS WITH SWALLOWING AND MASTICATION: ICD-10-CM

## 2025-05-28 DIAGNOSIS — R12 HEARTBURN: ICD-10-CM

## 2025-05-28 DIAGNOSIS — K59.00 COLONIC CONSTIPATION: ICD-10-CM

## 2025-05-28 DIAGNOSIS — R19.4 FREQUENT BOWEL MOVEMENTS: ICD-10-CM

## 2025-05-28 PROCEDURE — 74220 X-RAY XM ESOPHAGUS 1CNTRST: CPT

## 2025-05-28 PROCEDURE — 700117 HCHG RX CONTRAST REV CODE 255

## 2025-05-28 RX ADMIN — BARIUM SULFATE 700 MG: 700 TABLET ORAL at 12:45

## 2025-06-10 ENCOUNTER — OFFICE VISIT (OUTPATIENT)
Dept: URGENT CARE | Facility: CLINIC | Age: 89
End: 2025-06-10
Payer: MEDICARE

## 2025-06-10 VITALS
WEIGHT: 123 LBS | BODY MASS INDEX: 22.63 KG/M2 | TEMPERATURE: 98.2 F | OXYGEN SATURATION: 93 % | HEIGHT: 62 IN | SYSTOLIC BLOOD PRESSURE: 106 MMHG | RESPIRATION RATE: 14 BRPM | DIASTOLIC BLOOD PRESSURE: 52 MMHG | HEART RATE: 70 BPM

## 2025-06-10 DIAGNOSIS — M10.9 ACUTE GOUT INVOLVING TOE OF RIGHT FOOT, UNSPECIFIED CAUSE: Primary | ICD-10-CM

## 2025-06-10 PROCEDURE — 99214 OFFICE O/P EST MOD 30 MIN: CPT

## 2025-06-10 PROCEDURE — 3074F SYST BP LT 130 MM HG: CPT

## 2025-06-10 PROCEDURE — 3078F DIAST BP <80 MM HG: CPT

## 2025-06-10 RX ORDER — PREDNISONE 20 MG/1
30 TABLET ORAL DAILY
Qty: 8 TABLET | Refills: 0 | Status: SHIPPED | OUTPATIENT
Start: 2025-06-10 | End: 2025-06-15

## 2025-06-10 ASSESSMENT — ENCOUNTER SYMPTOMS: DIAPHORESIS: 0

## 2025-06-10 ASSESSMENT — FIBROSIS 4 INDEX: FIB4 SCORE: 1.51

## 2025-06-10 NOTE — PROGRESS NOTES
"Subjective     Michelle Mckeon is a 89 y.o. female who presents with Foot Problem (\"Right foot big toe is hot and red and is spreading to top of foot.\" \"Pain and cramping that spreads up right leg\"/Here with Dtr: Ryan)    Patient presents here with her daughter.  Symptom onset last night, sudden R great toe pain and swelling. No fever or chills. Denies trauma. Denies hx of DM. No known triggers or precipitating events. Feels well otherwise. No sensory changes or loss. No other aggravating or alleviating factors.          Review of Systems   Constitutional:  Negative for diaphoresis and malaise/fatigue.   Skin:  Negative for rash.   All other systems reviewed and are negative.      Medications:    acetaminophen Tabs  alendronate Tabs  aspirin Chew  cefdinir Caps  divalproex Tbec  gabapentin Caps  GaviLyte-G Solr  hydroxyurea Caps  lamoTRIgine Tabs  levothyroxine Tabs  lidocaine Ptch  Lipitor Tabs  lisinopril Tabs  MAGNESIUM PO  meloxicam Tabs  omeprazole  Rexulti Tabs  scopolamine Pt72  tizanidine Tabs  traMADol Tabs  VITAMIN D (CHOLECALCIFEROL) PO    Allergies:  Sulfa drugs    Past Social Hx:  Michelle Mckeon  reports that she quit smoking about 60 years ago. Her smoking use included cigarettes. She has never been exposed to tobacco smoke. She has never used smokeless tobacco. She reports that she does not currently use alcohol after a past usage of about 4.2 oz of alcohol per week. She reports that she does not use drugs.    Past Medical Hx: Past Medical History[1]              Objective     /52 (BP Location: Left arm, Patient Position: Sitting, BP Cuff Size: Adult)   Pulse 70   Temp 36.8 °C (98.2 °F) (Temporal)   Resp 14   Ht 1.575 m (5' 2\")   Wt 55.8 kg (123 lb)   SpO2 93%   BMI 22.50 kg/m²      Physical Exam  Vitals reviewed.   Constitutional:       Appearance: Normal appearance.   HENT:      Head: Normocephalic and atraumatic.      Right Ear: External ear normal.      Left Ear: External " ear normal.      Nose: Nose normal.      Mouth/Throat:      Mouth: Mucous membranes are moist.   Eyes:      Conjunctiva/sclera: Conjunctivae normal.   Cardiovascular:      Rate and Rhythm: Normal rate.   Pulmonary:      Effort: Pulmonary effort is normal.   Musculoskeletal:        Feet:    Skin:     General: Skin is warm and dry.      Capillary Refill: Capillary refill takes less than 2 seconds.   Neurological:      Mental Status: She is alert and oriented to person, place, and time.                                  Assessment & Plan  Acute gout involving toe of right foot, unspecified cause    Orders:    predniSONE (DELTASONE) 20 MG Tab; Take 1.5 Tablets by mouth every day for 5 days.    Patient remained afebrile throughout this visit.  Suspect acute gout of MTP joint.  PMH includes CKD. No recent procedures, denies history of DM. No signs of acute cellulitis on exam, though this remains an alternative diagnosis, advised to RTC in 2 days for re-evaluation.  No systemic symptoms note today.  Reports she has done well on prednisone in the past, tolerated well without any complications. No recent uric acid level. She does have an upcoming PCP appointment scheduled to complete labs, this is excellent follow-up. Strict ER precautions advised.    Differential diagnosis, natural history, supportive care, management options, risks/benefits, and alternatives to treatment discussed. Questions were encouraged and answered. Pt/parent/guardian verbalized understanding and the treatment plan was agreed upon. Advised to follow-up as needed with PCP or RTC for recheck, reevaluation, and consideration of further management. Red flags and indications for immediate care discussed. Advised to go to the Emergency Department or call 911 if symptoms fail to improve, for any change in condition or new concerning symptoms.     Reasonable side affects and potential adverse effects of medication discussed.    This note was electronically  signed by   Gill Padilla, SUNSHINE, APRN, FNP-BC           [1]   Past Medical History:  Diagnosis Date    Arthritis     Chronic kidney disease     Disorder of thyroid     Fall     Hypertension

## 2025-06-11 ENCOUNTER — HOSPITAL ENCOUNTER (OUTPATIENT)
Dept: LAB | Facility: MEDICAL CENTER | Age: 89
End: 2025-06-11
Attending: FAMILY MEDICINE
Payer: MEDICARE

## 2025-06-11 LAB
ALBUMIN SERPL BCP-MCNC: 4.3 G/DL (ref 3.2–4.9)
ALBUMIN/GLOB SERPL: 2.4 G/DL
ALP SERPL-CCNC: 75 U/L (ref 30–99)
ALT SERPL-CCNC: 18 U/L (ref 2–50)
ANION GAP SERPL CALC-SCNC: 11 MMOL/L (ref 7–16)
AST SERPL-CCNC: 20 U/L (ref 12–45)
BASOPHILS # BLD AUTO: 0.3 % (ref 0–1.8)
BASOPHILS # BLD: 0.03 K/UL (ref 0–0.12)
BILIRUB SERPL-MCNC: 0.5 MG/DL (ref 0.1–1.5)
BUN SERPL-MCNC: 19 MG/DL (ref 8–22)
CALCIUM ALBUM COR SERPL-MCNC: 9.3 MG/DL (ref 8.5–10.5)
CALCIUM SERPL-MCNC: 9.5 MG/DL (ref 8.5–10.5)
CHLORIDE SERPL-SCNC: 106 MMOL/L (ref 96–112)
CHOLEST SERPL-MCNC: 133 MG/DL (ref 100–199)
CO2 SERPL-SCNC: 25 MMOL/L (ref 20–33)
CREAT SERPL-MCNC: 1.05 MG/DL (ref 0.5–1.4)
EOSINOPHIL # BLD AUTO: 0.11 K/UL (ref 0–0.51)
EOSINOPHIL NFR BLD: 1.1 % (ref 0–6.9)
ERYTHROCYTE [DISTWIDTH] IN BLOOD BY AUTOMATED COUNT: 61.3 FL (ref 35.9–50)
EST. AVERAGE GLUCOSE BLD GHB EST-MCNC: 120 MG/DL
FASTING STATUS PATIENT QL REPORTED: NORMAL
GFR SERPLBLD CREATININE-BSD FMLA CKD-EPI: 51 ML/MIN/1.73 M 2
GLOBULIN SER CALC-MCNC: 1.8 G/DL (ref 1.9–3.5)
GLUCOSE SERPL-MCNC: 112 MG/DL (ref 65–99)
HBA1C MFR BLD: 5.8 % (ref 4–5.6)
HCT VFR BLD AUTO: 41.5 % (ref 37–47)
HDLC SERPL-MCNC: 86 MG/DL
HGB BLD-MCNC: 13.7 G/DL (ref 12–16)
IMM GRANULOCYTES # BLD AUTO: 0.25 K/UL (ref 0–0.11)
IMM GRANULOCYTES NFR BLD AUTO: 2.6 % (ref 0–0.9)
LDLC SERPL CALC-MCNC: 38 MG/DL
LYMPHOCYTES # BLD AUTO: 1.12 K/UL (ref 1–4.8)
LYMPHOCYTES NFR BLD: 11.7 % (ref 22–41)
MCH RBC QN AUTO: 32.2 PG (ref 27–33)
MCHC RBC AUTO-ENTMCNC: 33 G/DL (ref 32.2–35.5)
MCV RBC AUTO: 97.6 FL (ref 81.4–97.8)
MONOCYTES # BLD AUTO: 1.48 K/UL (ref 0–0.85)
MONOCYTES NFR BLD AUTO: 15.4 % (ref 0–13.4)
NEUTROPHILS # BLD AUTO: 6.61 K/UL (ref 1.82–7.42)
NEUTROPHILS NFR BLD: 68.9 % (ref 44–72)
NRBC # BLD AUTO: 0 K/UL
NRBC BLD-RTO: 0 /100 WBC (ref 0–0.2)
PLATELET # BLD AUTO: 216 K/UL (ref 164–446)
PMV BLD AUTO: 11.7 FL (ref 9–12.9)
POTASSIUM SERPL-SCNC: 4.7 MMOL/L (ref 3.6–5.5)
PROT SERPL-MCNC: 6.1 G/DL (ref 6–8.2)
RBC # BLD AUTO: 4.25 M/UL (ref 4.2–5.4)
SODIUM SERPL-SCNC: 142 MMOL/L (ref 135–145)
T4 SERPL-MCNC: 7.8 UG/DL (ref 4–12)
TRIGL SERPL-MCNC: 43 MG/DL (ref 0–149)
TSH SERPL-ACNC: 2.51 UIU/ML (ref 0.38–5.33)
WBC # BLD AUTO: 9.6 K/UL (ref 4.8–10.8)

## 2025-06-11 PROCEDURE — 84443 ASSAY THYROID STIM HORMONE: CPT

## 2025-06-11 PROCEDURE — 80053 COMPREHEN METABOLIC PANEL: CPT

## 2025-06-11 PROCEDURE — 84436 ASSAY OF TOTAL THYROXINE: CPT

## 2025-06-11 PROCEDURE — 85025 COMPLETE CBC W/AUTO DIFF WBC: CPT

## 2025-06-11 PROCEDURE — 83036 HEMOGLOBIN GLYCOSYLATED A1C: CPT | Mod: GA

## 2025-06-11 PROCEDURE — 80061 LIPID PANEL: CPT

## 2025-06-11 PROCEDURE — 36415 COLL VENOUS BLD VENIPUNCTURE: CPT

## 2025-08-12 ENCOUNTER — HOSPITAL ENCOUNTER (OUTPATIENT)
Facility: MEDICAL CENTER | Age: 89
End: 2025-08-12
Attending: FAMILY MEDICINE
Payer: MEDICARE

## 2025-08-12 LAB
APPEARANCE UR: CLEAR
BILIRUB UR QL STRIP.AUTO: NEGATIVE
COLOR UR: YELLOW
GLUCOSE UR STRIP.AUTO-MCNC: NEGATIVE MG/DL
KETONES UR STRIP.AUTO-MCNC: NEGATIVE MG/DL
LEUKOCYTE ESTERASE UR QL STRIP.AUTO: NEGATIVE
MICRO URNS: NORMAL
NITRITE UR QL STRIP.AUTO: NEGATIVE
PH UR STRIP.AUTO: 6.5 [PH] (ref 5–8)
PROT UR QL STRIP: NEGATIVE MG/DL
RBC UR QL AUTO: NEGATIVE
SP GR UR STRIP.AUTO: 1.01
UROBILINOGEN UR STRIP.AUTO-MCNC: 0.2 EU/DL

## 2025-08-12 PROCEDURE — 81003 URINALYSIS AUTO W/O SCOPE: CPT

## (undated) DEVICE — DRAPE LARGE 3 QUARTER - (20/CA)

## (undated) DEVICE — CELLSAVER PACK

## (undated) DEVICE — SUTURE 1 VICRYL PLUS CTX - 8 X 18 INCH (12/BX)

## (undated) DEVICE — CHLORAPREP 26 ML APPLICATOR - ORANGE TINT(25/CA)

## (undated) DEVICE — SEALER BIPOLAR 2.3 AQUAMANTYS

## (undated) DEVICE — BONE MILL BM210

## (undated) DEVICE — GOWN WARMING STANDARD FLEX - (30/CA)

## (undated) DEVICE — GLOVE SIZE 7.0 SURGEON ACCELERATOR FREE GREEN (50PR/BX 4BX/CA)

## (undated) DEVICE — LACTATED RINGERS INJ 1000 ML - (14EA/CA 60CA/PF)

## (undated) DEVICE — SOD. CHL. INJ. 0.9% 1000 ML - (14EA/CA 60CA/PF)

## (undated) DEVICE — TOOL MR8 14CM MATCH HD SYM-TRI 3MM DIAMETER (1/EA)

## (undated) DEVICE — GLOVE BIOGEL SZ 8 SURGICAL PF LTX - (50PR/BX 4BX/CA)

## (undated) DEVICE — SET LEADWIRE 5 LEAD BEDSIDE DISPOSABLE ECG (1SET OF 5/EA)

## (undated) DEVICE — SLEEVE, VASO, THIGH, MED

## (undated) DEVICE — GLOVE BIOGEL INDICATOR SZ 8 SURGICAL PF LTX - (50/BX 4BX/CA)

## (undated) DEVICE — TRAY CATHETER FOLEY URINE METER W/STATLOCK 350ML (10EA/CA)

## (undated) DEVICE — TUBING CLEARLINK DUO-VENT - C-FLO (48EA/CA)

## (undated) DEVICE — INTRAOP NEURO IN OR 1:1 PER 15 MIN

## (undated) DEVICE — BAG SPONGE COUNT 10.25 X 32 - BLUE (250/CA)

## (undated) DEVICE — DRAPE LAPAROTOMY T SHEET - (12EA/CA)

## (undated) DEVICE — SPONGE GAUZESTER 4 X 4 4PLY - (128PK/CA)

## (undated) DEVICE — KIT EVACUATER 3 SPRING PVC LF 1/8 DRAIN SIZE (10EA/CA)"

## (undated) DEVICE — HEADREST PRONEVIEW LARGE - (10/CA)

## (undated) DEVICE — SPHERE NAVIGATION STEALTH (5EA/TY 12TY/PK)

## (undated) DEVICE — DRAPE SURG STERI-DRAPE 7X11OD - (40EA/CA)

## (undated) DEVICE — SUTURE 4-0 30CM STRATAFIX SPIRAL PS-2 (12EA/BX)

## (undated) DEVICE — SUTURE GENERAL

## (undated) DEVICE — DRESSING XEROFORM 1X8 - (50/BX 4BX/CA)

## (undated) DEVICE — DEVICE MONOPOLAR RF PEAK PLASMABLADE 3.0S

## (undated) DEVICE — ELECTRODE DUAL RETURN W/ CORD - (50/PK)

## (undated) DEVICE — DECANTER FLD BLS - (50/CA)

## (undated) DEVICE — PUMP ON-Q 270 DUAL 2ML FIXED RATE (5EA/CA)

## (undated) DEVICE — SET EXTENSION WITH 2 PORTS (48EA/CA) ***PART #2C8610 IS A SUBSTITUTE*****

## (undated) DEVICE — SUCTION INSTRUMENT YANKAUER BULBOUS TIP W/O VENT (50EA/CA)

## (undated) DEVICE — CATHETER ON-Q SILVER SOAKER 5IN  (5EA/CA)  - SUB ORDER #4428

## (undated) DEVICE — KIT HEMOSTATIC GELATIN MATRIX FLOSEAL NT 5ML (6EA/CA)

## (undated) DEVICE — TUBING C&T SET FLYING LEADS DRAIN TUBING (10EA/BX)

## (undated) DEVICE — SENSOR OXIMETER ADULT SPO2 RD SET (20EA/BX)

## (undated) DEVICE — PIN PERCUTANEOUS STERILE 150MM

## (undated) DEVICE — PACK NEURO - (2EA/CA)

## (undated) DEVICE — LACTATED RINGERS INJ. 500 ML - (24EA/CA)

## (undated) DEVICE — TOOL DISSECTING BIT MR8 OD3MM L14CM (1EA)

## (undated) DEVICE — ARMREST CRADLE FOAM - (2PR/PK 12PR/CA)

## (undated) DEVICE — DERMABOND ADVANCED - (12EA/BX)

## (undated) DEVICE — MIDAS LUBRICATOR DIFFUSER PACK (4EA/CA)

## (undated) DEVICE — CANISTER SUCTION 3000ML MECHANICAL FILTER AUTO SHUTOFF MEDI-VAC NONSTERILE LF DISP  (40EA/CA)

## (undated) DEVICE — PROTECTOR ULNA NERVE - (36PR/CA)

## (undated) DEVICE — SUTURE 2-0 VICRYL PLUS CT-1 - 8 X 18 INCH(12/BX)

## (undated) DEVICE — BLADE SURGICAL CLIPPER - (50EA/CA)

## (undated) DEVICE — COVER LIGHT HANDLE ALC PLUS DISP (18EA/BX)

## (undated) DEVICE — CELLSAVER STAT

## (undated) DEVICE — TOWEL STOP TIMEOUT SAFETY FLAG (40EA/CA)